# Patient Record
Sex: FEMALE | Race: WHITE | NOT HISPANIC OR LATINO | Employment: OTHER | URBAN - METROPOLITAN AREA
[De-identification: names, ages, dates, MRNs, and addresses within clinical notes are randomized per-mention and may not be internally consistent; named-entity substitution may affect disease eponyms.]

---

## 2017-01-17 ENCOUNTER — TELEPHONE (OUTPATIENT)
Dept: INTERNAL MEDICINE | Facility: CLINIC | Age: 82
End: 2017-01-17

## 2017-01-18 DIAGNOSIS — Z94.0 S/P KIDNEY TRANSPLANT: Primary | ICD-10-CM

## 2017-01-18 DIAGNOSIS — Z79.60 LONG-TERM USE OF IMMUNOSUPPRESSANT MEDICATION: ICD-10-CM

## 2017-01-18 DIAGNOSIS — D84.9 IMMUNOSUPPRESSED STATUS (H): ICD-10-CM

## 2017-01-18 DIAGNOSIS — Z94.0 KIDNEY TRANSPLANT RECIPIENT: ICD-10-CM

## 2017-01-18 RX ORDER — AZATHIOPRINE 50 MG/1
50 TABLET ORAL DAILY
Qty: 90 TABLET | Refills: 3 | Status: SHIPPED | OUTPATIENT
Start: 2017-01-18 | End: 2017-01-24

## 2017-01-18 RX ORDER — CYCLOSPORINE 100 MG/1
100 CAPSULE, LIQUID FILLED ORAL DAILY
Qty: 90 CAPSULE | Refills: 3 | Status: SHIPPED | OUTPATIENT
Start: 2017-01-18 | End: 2017-01-26

## 2017-01-19 ENCOUNTER — TELEPHONE (OUTPATIENT)
Dept: TRANSPLANT | Facility: CLINIC | Age: 82
End: 2017-01-19

## 2017-01-19 NOTE — TELEPHONE ENCOUNTER
Prior Authorization Specialty Medication Request    Medication/Dose: Gengraf 100 mg  Diagnosis and ICD: Kidney Transplant Z94.0  New/Renewal/Insurance Change PA:     Important Lab Values:     Previously Tried and Failed Therapies:     Rationale:     Would you like to include any research articles?                If yes please include the hyperlink(s) below or fax @ [phone].                (Include Name and MRN)    If you received a fax notification from an outside Pharmacy;  Pharmacy Name:[facility]  Pharmacy #:[phone]  Pharmacy Fax:[phone]

## 2017-01-19 NOTE — TELEPHONE ENCOUNTER
Prior Authorization Specialty Medication Request    Medication/Dose: Azathioprine 50 mg daily  Diagnosis and ICD: Kidney Transplant Z94.0  New/Renewal/Insurance Change PA:     Important Lab Values:     Previously Tried and Failed Therapies:     Rationale:     Would you like to include any research articles?                If yes please include the hyperlink(s) below or fax @ [phone].                (Include Name and MRN)    If you received a fax notification from an outside Pharmacy;  Pharmacy Name:[facility]  Pharmacy #:[phone]  Pharmacy Fax:[phone]

## 2017-01-19 NOTE — TELEPHONE ENCOUNTER
Prior Authorization Specialty Medication Request    Medication/Dose: Azathioprine 50 mg daily  Diagnosis and ICD: Kidney Transplant Z94.0  New/Renewal/Insurance Change PA:     Important Lab Values:     Previously Tried and Failed Therapies:     Rationale:     Would you like to include any research articles?    If yes please include the hyperlink(s) below or fax @ [phone].    (Include Name and MRN)    If you received a fax notification from an outside Pharmacy;  Pharmacy Name:[facility]  Pharmacy #:[phone]  Pharmacy Fax:[phone]

## 2017-01-24 ENCOUNTER — TELEPHONE (OUTPATIENT)
Dept: TRANSPLANT | Facility: CLINIC | Age: 82
End: 2017-01-24

## 2017-01-24 DIAGNOSIS — Z79.60 LONG-TERM USE OF IMMUNOSUPPRESSANT MEDICATION: ICD-10-CM

## 2017-01-24 DIAGNOSIS — Z94.0 KIDNEY TRANSPLANT RECIPIENT: Primary | ICD-10-CM

## 2017-01-24 RX ORDER — AZATHIOPRINE 50 MG/1
50 TABLET ORAL DAILY
Qty: 7 TABLET | Refills: 0 | Status: SHIPPED | OUTPATIENT
Start: 2017-01-24 | End: 2017-12-22

## 2017-01-24 RX ORDER — AZATHIOPRINE 50 MG/1
50 TABLET ORAL DAILY
Qty: 90 TABLET | Refills: 3 | Status: SHIPPED
Start: 2017-01-24 | End: 2017-01-24

## 2017-01-24 NOTE — TELEPHONE ENCOUNTER
[facility] Prior Authorization Team   Phone: [phone]  Fax: [phone]      PA Initiation    Medication: cycloSPORINE modified (GENERIC EQUIVALENT) 100 MG capsule   Insurance Company: [employer] - Phone [phone] Fax [phone]  Pharmacy Filling the Rx: [facility] - [address]  Filling Pharmacy Phone: [phone]  Filling Pharmacy Fax: [phone]  Start Date: 1/23/2017    If [employer] has not replied to your request within 24 hours please contact [employer] at [phone]

## 2017-01-24 NOTE — TELEPHONE ENCOUNTER
[facility] Prior Authorization Team   Phone: [phone]  Fax: [phone]      PRIOR AUTHORIZATION DENIED    Medication: Azathioprine 50 mg daily    Denial Date:      Denial Rational: PA was already submitted for this patient and drug which was denied.;CaseId:[id number];Status:Denied    Appeal Information: Appeal Information: Attention:COMPLAINTS, APPEALS, GRIEVANCES [facility] [address],[place], [place],[address] Phone:[phone];

## 2017-01-24 NOTE — TELEPHONE ENCOUNTER
Cande ([employer]) left VM re: PA below. PA approved, backdated covered of 12/24/16 w/ lifetime approval to 2099. Ref number: [id number]. Can be reached at [phone]. Message sent to PA pool.

## 2017-01-24 NOTE — TELEPHONE ENCOUNTER
Prior Authorization Specialty Medication Request    Medication/Dose: Imuran 50 mg daily  Diagnosis and ICD: Kidney Transplant Z94.0  New/Renewal/Insurance Change PA:     Important Lab Values:     Previously Tried and Failed Therapies:     Rationale:     Would you like to include any research articles?    If yes please include the hyperlink(s) below or fax @ [phone].    (Include Name and MRN)    If you received a fax notification from an outside Pharmacy;  Pharmacy Name:[facility]  Pharmacy #:  Pharmacy Fax:

## 2017-01-26 DIAGNOSIS — Z79.60 LONG-TERM USE OF IMMUNOSUPPRESSANT MEDICATION: ICD-10-CM

## 2017-01-26 DIAGNOSIS — Z94.0 KIDNEY TRANSPLANT RECIPIENT: Primary | ICD-10-CM

## 2017-01-26 RX ORDER — CYCLOSPORINE 100 MG/1
100 CAPSULE, LIQUID FILLED ORAL DAILY
Qty: 90 CAPSULE | Refills: 3 | Status: SHIPPED
Start: 2017-01-26 | End: 2019-09-25 | Stop reason: DRUGHIGH

## 2017-01-30 DIAGNOSIS — Z79.60 LONG-TERM USE OF IMMUNOSUPPRESSANT MEDICATION: ICD-10-CM

## 2017-01-30 DIAGNOSIS — Z94.0 KIDNEY TRANSPLANT RECIPIENT: Primary | ICD-10-CM

## 2017-01-30 RX ORDER — AZATHIOPRINE 50 MG/1
50 TABLET ORAL DAILY
Qty: 30 TABLET | Refills: 1 | OUTPATIENT
Start: 2017-01-30

## 2017-01-31 ENCOUNTER — TRANSFERRED RECORDS (OUTPATIENT)
Dept: HEALTH INFORMATION MANAGEMENT | Facility: CLINIC | Age: 82
End: 2017-01-31

## 2017-01-31 NOTE — TELEPHONE ENCOUNTER
[facility] Prior Authorization Team   Phone: [phone]  Fax: [phone]    PRIOR AUTHORIZATION DENIED    Medication: Imuran - Denied    Denial Date: 1/31/2017    Denial Rational: see below    Appeal Information: *Appeal Information: Attention:COMPLAINTS, APPEALS, MAXINE WILLETT [address],[place], [place],[address] Phone:[phone]. Please let us know if you would like to appeal this denial. If appealing, please provide a letter of medical necessity as to why patient needs Imuran and specific if Brand or generic.

## 2017-04-04 ENCOUNTER — TELEPHONE (OUTPATIENT)
Dept: INTERNAL MEDICINE | Facility: CLINIC | Age: 82
End: 2017-04-04

## 2017-05-24 ENCOUNTER — TELEPHONE (OUTPATIENT)
Dept: INTERNAL MEDICINE | Facility: CLINIC | Age: 82
End: 2017-05-24

## 2017-05-24 DIAGNOSIS — Z94.0 S/P KIDNEY TRANSPLANT: Primary | ICD-10-CM

## 2017-05-24 DIAGNOSIS — R19.4 CHANGE IN BOWEL HABITS: ICD-10-CM

## 2017-05-24 DIAGNOSIS — M19.90 ARTHRITIS: ICD-10-CM

## 2017-05-24 DIAGNOSIS — N25.81 SECONDARY HYPERPARATHYROIDISM OF RENAL ORIGIN (H): ICD-10-CM

## 2017-05-24 NOTE — TELEPHONE ENCOUNTER
Patient calling in and states has an appointment with new Nephrologist on 6/1/17, Dr. ISAMAR Díaz through [facility]. States would need Cr for sure and any other PCP thought necessary for nephrology.  Call patient when ordered and advise if needs to be fasting. OK to leave detailed message for patient.  Provider please review and advise.

## 2017-05-26 DIAGNOSIS — N25.81 SECONDARY HYPERPARATHYROIDISM OF RENAL ORIGIN (H): ICD-10-CM

## 2017-05-26 DIAGNOSIS — M19.90 ARTHRITIS: ICD-10-CM

## 2017-05-26 DIAGNOSIS — R19.4 CHANGE IN BOWEL HABITS: ICD-10-CM

## 2017-05-26 DIAGNOSIS — Z94.0 S/P KIDNEY TRANSPLANT: ICD-10-CM

## 2017-05-26 LAB
ERYTHROCYTE [DISTWIDTH] IN BLOOD BY AUTOMATED COUNT: 14.6 % (ref 10–15)
HCT VFR BLD AUTO: 40.6 % (ref 35–47)
HGB BLD-MCNC: 12.9 G/DL (ref 11.7–15.7)
MCH RBC QN AUTO: 30.8 PG (ref 26.5–33)
MCHC RBC AUTO-ENTMCNC: 31.8 G/DL (ref 31.5–36.5)
MCV RBC AUTO: 97 FL (ref 78–100)
PLATELET # BLD AUTO: 259 10E9/L (ref 150–450)
PTH-INTACT SERPL-MCNC: 90 PG/ML (ref 12–72)
RBC # BLD AUTO: 4.19 10E12/L (ref 3.8–5.2)
WBC # BLD AUTO: 7.7 10E9/L (ref 4–11)

## 2017-05-26 PROCEDURE — 82306 VITAMIN D 25 HYDROXY: CPT | Performed by: INTERNAL MEDICINE

## 2017-05-26 PROCEDURE — 83970 ASSAY OF PARATHORMONE: CPT | Performed by: INTERNAL MEDICINE

## 2017-05-26 PROCEDURE — 36415 COLL VENOUS BLD VENIPUNCTURE: CPT | Performed by: INTERNAL MEDICINE

## 2017-05-26 PROCEDURE — 85027 COMPLETE CBC AUTOMATED: CPT | Performed by: INTERNAL MEDICINE

## 2017-05-26 PROCEDURE — 80053 COMPREHEN METABOLIC PANEL: CPT | Performed by: INTERNAL MEDICINE

## 2017-05-26 PROCEDURE — 84443 ASSAY THYROID STIM HORMONE: CPT | Performed by: INTERNAL MEDICINE

## 2017-05-26 PROCEDURE — 80061 LIPID PANEL: CPT | Performed by: INTERNAL MEDICINE

## 2017-05-27 LAB
ALBUMIN SERPL-MCNC: 3.8 G/DL (ref 3.4–5)
ALP SERPL-CCNC: 100 U/L (ref 40–150)
ALT SERPL W P-5'-P-CCNC: 15 U/L (ref 0–50)
ANION GAP SERPL CALCULATED.3IONS-SCNC: 8 MMOL/L (ref 3–14)
AST SERPL W P-5'-P-CCNC: 18 U/L (ref 0–45)
BILIRUB SERPL-MCNC: 0.8 MG/DL (ref 0.2–1.3)
BUN SERPL-MCNC: 18 MG/DL (ref 7–30)
CALCIUM SERPL-MCNC: 9.5 MG/DL (ref 8.5–10.1)
CHLORIDE SERPL-SCNC: 100 MMOL/L (ref 94–109)
CHOLEST SERPL-MCNC: 203 MG/DL
CO2 SERPL-SCNC: 26 MMOL/L (ref 20–32)
CREAT SERPL-MCNC: 0.87 MG/DL (ref 0.52–1.04)
GFR SERPL CREATININE-BSD FRML MDRD: 63 ML/MIN/1.7M2
GLUCOSE SERPL-MCNC: 105 MG/DL (ref 70–99)
HDLC SERPL-MCNC: 69 MG/DL
LDLC SERPL CALC-MCNC: 108 MG/DL
NONHDLC SERPL-MCNC: 134 MG/DL
POTASSIUM SERPL-SCNC: 4.4 MMOL/L (ref 3.4–5.3)
PROT SERPL-MCNC: 7.6 G/DL (ref 6.8–8.8)
SODIUM SERPL-SCNC: 134 MMOL/L (ref 133–144)
TRIGL SERPL-MCNC: 131 MG/DL
TSH SERPL DL<=0.005 MIU/L-ACNC: 2.2 MU/L (ref 0.4–4)

## 2017-05-28 LAB — DEPRECATED CALCIDIOL+CALCIFEROL SERPL-MC: 53 UG/L (ref 20–75)

## 2017-06-07 ENCOUNTER — TELEPHONE (OUTPATIENT)
Dept: INTERNAL MEDICINE | Facility: CLINIC | Age: 82
End: 2017-06-07

## 2017-07-23 ENCOUNTER — DOCUMENTATION ONLY (OUTPATIENT)
Dept: OTHER | Facility: CLINIC | Age: 82
End: 2017-07-23

## 2017-07-23 DIAGNOSIS — Z71.89 ADVANCED DIRECTIVES, COUNSELING/DISCUSSION: Chronic | ICD-10-CM

## 2017-08-15 ENCOUNTER — OFFICE VISIT (OUTPATIENT)
Dept: INTERNAL MEDICINE | Facility: CLINIC | Age: 82
End: 2017-08-15
Payer: COMMERCIAL

## 2017-08-15 VITALS
HEART RATE: 101 BPM | SYSTOLIC BLOOD PRESSURE: 118 MMHG | DIASTOLIC BLOOD PRESSURE: 68 MMHG | OXYGEN SATURATION: 96 % | WEIGHT: 146.3 LBS | HEIGHT: 65 IN | BODY MASS INDEX: 24.37 KG/M2 | TEMPERATURE: 97.7 F

## 2017-08-15 DIAGNOSIS — D84.9 IMMUNOSUPPRESSED STATUS (H): ICD-10-CM

## 2017-08-15 DIAGNOSIS — N25.81 SECONDARY HYPERPARATHYROIDISM OF RENAL ORIGIN (H): ICD-10-CM

## 2017-08-15 DIAGNOSIS — Z00.00 ROUTINE GENERAL MEDICAL EXAMINATION AT A HEALTH CARE FACILITY: Primary | ICD-10-CM

## 2017-08-15 DIAGNOSIS — Z94.0 S/P KIDNEY TRANSPLANT: ICD-10-CM

## 2017-08-15 LAB
ERYTHROCYTE [DISTWIDTH] IN BLOOD BY AUTOMATED COUNT: 14.4 % (ref 10–15)
HCT VFR BLD AUTO: 42.1 % (ref 35–47)
HGB BLD-MCNC: 13.4 G/DL (ref 11.7–15.7)
MCH RBC QN AUTO: 30.5 PG (ref 26.5–33)
MCHC RBC AUTO-ENTMCNC: 31.8 G/DL (ref 31.5–36.5)
MCV RBC AUTO: 96 FL (ref 78–100)
PLATELET # BLD AUTO: 281 10E9/L (ref 150–450)
PTH-INTACT SERPL-MCNC: 75 PG/ML (ref 12–72)
RBC # BLD AUTO: 4.4 10E12/L (ref 3.8–5.2)
WBC # BLD AUTO: 9.1 10E9/L (ref 4–11)

## 2017-08-15 PROCEDURE — 82306 VITAMIN D 25 HYDROXY: CPT | Performed by: INTERNAL MEDICINE

## 2017-08-15 PROCEDURE — 83970 ASSAY OF PARATHORMONE: CPT | Performed by: INTERNAL MEDICINE

## 2017-08-15 PROCEDURE — 80053 COMPREHEN METABOLIC PANEL: CPT | Performed by: INTERNAL MEDICINE

## 2017-08-15 PROCEDURE — 36415 COLL VENOUS BLD VENIPUNCTURE: CPT | Performed by: INTERNAL MEDICINE

## 2017-08-15 PROCEDURE — 99397 PER PM REEVAL EST PAT 65+ YR: CPT | Performed by: INTERNAL MEDICINE

## 2017-08-15 PROCEDURE — 80061 LIPID PANEL: CPT | Performed by: INTERNAL MEDICINE

## 2017-08-15 PROCEDURE — 85027 COMPLETE CBC AUTOMATED: CPT | Performed by: INTERNAL MEDICINE

## 2017-08-15 PROCEDURE — 84443 ASSAY THYROID STIM HORMONE: CPT | Performed by: INTERNAL MEDICINE

## 2017-08-15 NOTE — MR AVS SNAPSHOT
After Visit Summary   8/15/2017    Cande Baird    MRN: [id number]           Patient Information     Date Of Birth          1935        Visit Information        Provider Department      8/15/2017 10:20 AM Donna Biard MD [facility]        Today's Diagnoses     Routine general medical examination at a health care facility    -  1    Immunosuppressed status (H)        Secondary hyperparathyroidism of renal origin (H)         S/P kidney transplant          Care Instructions      Preventive Health Recommendations    Female Ages 65 +    Yearly exam:     See your health care provider every year in order to  o Review health changes.   o Discuss preventive care.    o Review your medicines if your doctor has prescribed any.      You no longer need a yearly Pap test unless you've had an abnormal Pap test in the past 10 years. If you have vaginal symptoms, such as bleeding or discharge, be sure to talk with your provider about a Pap test.      Every 1 to 2 years, have a mammogram.  If you are over 69, talk with your health care provider about whether or not you want to continue having screening mammograms.      Every 10 years, have a colonoscopy. Or, have a yearly FIT test (stool test). These exams will check for colon cancer.       Have a cholesterol test every 5 years, or more often if your doctor advises it.       Have a diabetes test (fasting glucose) every three years. If you are at risk for diabetes, you should have this test more often.       At age 65, have a bone density scan (DEXA) to check for osteoporosis (brittle bone disease).    Shots:    Get a flu shot each year.    Get a tetanus shot every 10 years.    Talk to your doctor about your pneumonia vaccines. There are now two you should receive - Pneumovax (PPSV 23) and Prevnar (PCV 13).    Talk to your doctor about the shingles vaccine.    Talk to your doctor about the hepatitis B vaccine.    Nutrition:     Eat at least 5  servings of fruits and vegetables each day.      Eat whole-grain bread, whole-wheat pasta and brown rice instead of white grains and rice.      Talk to your provider about Calcium and Vitamin D.     Lifestyle    Exercise at least 150 minutes a week (30 minutes a day, 5 days a week). This will help you control your weight and prevent disease.      Limit alcohol to one drink per day.      No smoking.       Wear sunscreen to prevent skin cancer.       See your dentist twice a year for an exam and cleaning.      See your eye doctor every 1 to 2 years to screen for conditions such as glaucoma, macular degeneration and cataracts.          Follow-ups after your visit        Who to contact     If you have questions or need follow up information about today's clinic visit or your schedule please contact [facility] directly at [phone].  Normal or non-critical lab and imaging results will be communicated to you by MyChart, letter or phone within 4 business days after the clinic has received the results. If you do not hear from us within 7 days, please contact the clinic through [employer] or phone. If you have a critical or abnormal lab result, we will notify you by phone as soon as possible.  Submit refill requests through [employer] or call your pharmacy and they will forward the refill request to us. Please allow 3 business days for your refill to be completed.          Additional Information About Your Visit        [employer] Information     [employer] gives you secure access to your electronic health record. If you see a primary care provider, you can also send messages to your care team and make appointments. If you have questions, please call your primary care clinic.  If you do not have a primary care provider, please call [phone] and they will assist you.        Care EveryWhere ID     This is your Care EveryWhere ID. This could be used by other organizations to access your [facility]  "records  [id number]        Your Vitals Were     Pulse Temperature Height Pulse Oximetry Breastfeeding? BMI (Body Mass Index)    101 97.7  F (36.5  C) (Oral) 5' 5\" (1.651 m) 96% No 24.35 kg/m2       Blood Pressure from Last 3 Encounters:   08/15/17 118/68   12/28/16 112/72   12/20/16 122/68    Weight from Last 3 Encounters:   08/15/17 146 lb 4.8 oz (66.4 kg)   12/28/16 147 lb (66.7 kg)   12/20/16 147 lb (66.7 kg)              We Performed the Following     CBC with platelets     Comprehensive metabolic panel (BMP + Alb, Alk Phos, ALT, AST, Total. Bili, TP)     Lipid panel reflex to direct LDL     Parathyroid Hormone Intact     TSH with free T4 reflex     Vitamin D Deficiency        Primary Care Provider Office Phone # Fax #    Donna Baird [phone]       [address] [facility] [address]  [facility] [address]        Equal Access to Services     LYNDA [employer] : Hadii aad ku hadasho Soomaali, waaxda luqadaha, qaybta kaalmada adeegyada, waxay idiin hayrosanan jn orourke . So [facility] [phone].    ATENCIÓN: Si habla español, tiene a escobar disposición servicios gratuitos de asistencia lingüística. [facility] [phone].    We comply with applicable federal civil rights laws and [place] laws. We do not discriminate on the basis of race, color, national origin, age, disability sex, sexual orientation or gender identity.            Thank you!     Thank you for choosing [facility]  for your care. Our goal is always to provide you with excellent care. Hearing back from our patients is one way we can continue to improve our services. Please take a few minutes to complete the written survey that you may receive in the mail after your visit with us. Thank you!             Your Updated Medication List - Protect others around you: Learn how to safely use, store and throw away your medicines at www.disposemymeds.org.          This list is accurate as of: 8/15/17 10:52 AM.  Always use your " most recent med list.                   Brand Name Dispense Instructions for use Diagnosis    aspirin 81 MG tablet     30 tablet    Take by mouth daily    S/P kidney transplant       azaTHIOprine 50 MG tablet    IMURAN    7 tablet    Take 1 tablet (50 mg) by mouth daily    Kidney transplant recipient, Long-term use of immunosuppressant medication       ciprofloxacin 500 MG tablet    CIPRO    14 tablet    Take 1 tablet (500 mg) by mouth 2 times daily    Dysuria       cycloSPORINE modified 100 MG capsule    GENERIC EQUIVALENT    90 capsule    Take 1 capsule (100 mg) by mouth daily    Kidney transplant recipient, Long-term use of immunosuppressant medication       GLUCOSAMINE SULFATE PO      Take 1,000 mg by mouth 2 times daily Takes 1000mg in the morning and 1000mg in the evening        saccharomyces boulardii 250 MG capsule    FLORASTOR     Take 250 mg by mouth daily        vitamin B complex with vitamin C Tabs tablet      Take 1 tablet by mouth daily        VITAMIN D3 PO      Take 2,000 Units by mouth daily

## 2017-08-15 NOTE — PROGRESS NOTES
SUBJECTIVE:   Cande Baird is a 82 year old female who presents for Preventive Visit.      Are you in the first 12 months of your Medicare Part B coverage?  No    Healthy Habits:    Do you get at least three servings of calcium containing foods daily (dairy, green leafy vegetables, etc.)? yes    Amount of exercise or daily activities, outside of work: 3-4 day(s) per week    Problems taking medications regularly No    Medication side effects: No    Have you had an eye exam in the past two years? yes    Do you see a dentist twice per year? no    Do you have sleep apnea, excessive snoring or daytime drowsiness?no    COGNITIVE SCREEN  1) Repeat 3 items (Banana, Sunrise, Chair)    2) Clock draw: NORMAL  3) 3 item recall: Recalls 3 objects  Results: 3 items recalled: COGNITIVE IMPAIRMENT LESS LIKELY    Mini-CogTM Copyright S Shravan. Licensed by the author for use in [facility]; reprinted with permission (mary@[facility]). All rights reserved.                  Reviewed and updated as needed this visit by clinical staffTobacco  Allergies  Meds  Med Hx  Surg Hx  Fam Hx  Soc Hx        Reviewed and updated as needed this visit by Provider        Social History   Substance Use Topics     Smoking status: Former Smoker     Types: Cigarettes     Quit date: 5/25/1988     Smokeless tobacco: Never Used     Alcohol use 0.0 oz/week     0 Standard drinks or equivalent per week       The patient does not drink >3 drinks per day nor >7 drinks per week.    Today's PHQ-2 Score:   PHQ-2 ( 1999 Pfizer) 8/15/2017 12/20/2016   Q1: Little interest or pleasure in doing things 0 0   Q2: Feeling down, depressed or hopeless 0 0   PHQ-2 Score 0 0   Some recent data might be hidden         Do you feel safe in your environment - Yes    Do you have a Health Care Directive?: No: Advance care planning reviewed with patient; information given to patient to review.      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    Ability to successfully perform activities of daily living: Yes, no assistance needed     Fall risk:  Fallen 2 or more times in the past year?: No  Any fall with injury in the past year?: No      Home safety:  none identified      The following health maintenance items are reviewed in Epic and correct as of today:Health Maintenance   Topic Date Due     DEXA SCAN SCREENING (SYSTEM ASSIGNED)  07/01/2002     TETANUS IMMUNIZATION (SYSTEM ASSIGNED)  01/01/2015     PNEUMOCOCCAL (2 of 2 - PPSV23) 03/31/2016     INFLUENZA VACCINE (SYSTEM ASSIGNED)  09/01/2017     FALL RISK ASSESSMENT  12/20/2017     ADVANCE DIRECTIVE PLANNING Q5 YRS  07/23/2022     BP Readings from Last 3 Encounters:   08/15/17 118/68   12/28/16 112/72   12/20/16 122/68    Wt Readings from Last 3 Encounters:   08/15/17 146 lb 4.8 oz (66.4 kg)   12/28/16 147 lb (66.7 kg)   12/20/16 147 lb (66.7 kg)                  Patient Active Problem List   Diagnosis     Long-term use of immunosuppressant medication     Immunosuppressed status (H)     S/P kidney transplant     Arthritis     Secondary hyperparathyroidism of renal origin (H)      Advance Care Planning     Past Surgical History:   Procedure Laterality Date     COLONOSCOPY  5/15/2013     LAPAROSCOPIC CYSTECTOMY OVARIAN (BENIGN)       TRANSPLANT  October 17, 1987    Kidney       Social History   Substance Use Topics     Smoking status: Former Smoker     Types: Cigarettes     Quit date: 5/25/1988     Smokeless tobacco: Never Used     Alcohol use 0.0 oz/week     0 Standard drinks or equivalent per week     Family History   Problem Relation Age of Onset     DIABETES Mother 60     Peptic Ulcer Disease Father      DIABETES Sister      Rheumatoid Arthritis Sister      DIABETES Sister          Current Outpatient Prescriptions   Medication Sig Dispense Refill      "cycloSPORINE modified (GENERIC EQUIVALENT) 100 MG capsule Take 1 capsule (100 mg) by mouth daily 90 capsule 3     azaTHIOprine (IMURAN) 50 MG tablet Take 1 tablet (50 mg) by mouth daily 7 tablet 0     vitamin  B complex with vitamin C (VITAMIN  B COMPLEX) TABS Take 1 tablet by mouth daily       Cholecalciferol (VITAMIN D3 PO) Take 2,000 Units by mouth daily       GLUCOSAMINE SULFATE PO Take 1,000 mg by mouth 2 times daily Takes 1000mg in the morning and 1000mg in the evening       aspirin 81 MG tablet Take by mouth daily 30 tablet      ciprofloxacin (CIPRO) 500 MG tablet Take 1 tablet (500 mg) by mouth 2 times daily (Patient not taking: Reported on 8/15/2017) 14 tablet 0     saccharomyces boulardii (FLORASTOR) 250 MG capsule Take 250 mg by mouth daily                 ROS:  C: NEGATIVE for fever, chills, change in weight  I: NEGATIVE for worrisome rashes, moles or lesions  E: NEGATIVE for vision changes or irritation  ENT/MOUTH: some decreased hearing left ear  R: NEGATIVE for significant cough or SOB  B: NEGATIVE for masses, tenderness or discharge  CV: NEGATIVE for chest pain, palpitations or peripheral edema  GI: NEGATIVE for nausea, abdominal pain, heartburn, or change in bowel habits  : NEGATIVE for frequency, dysuria, or hematuria  M: NEGATIVE for significant arthralgias or myalgia  N: NEGATIVE for weakness, dizziness or paresthesias  E: NEGATIVE for temperature intolerance, skin/hair changes  H: NEGATIVE for bleeding problems  P: NEGATIVE for changes in mood or affect    OBJECTIVE:   There were no vitals taken for this visit. Estimated body mass index is 24.46 kg/(m^2) as calculated from the following:    Height as of 12/28/16: 5' 5\" (1.651 m).    Weight as of 12/28/16: 147 lb (66.7 kg).  EXAM:   GENERAL: healthy, alert and no distress  EYES: Eyes grossly normal to inspection, PERRL and conjunctivae and sclerae normal  HENT: ear canals and TM's normal, nose and mouth without ulcers or lesions  NECK: no " "adenopathy, no asymmetry, masses, or scars and thyroid normal to palpation  RESP: lungs clear to auscultation - no rales, rhonchi or wheezes  CV: regular rate and rhythm, normal S1 S2, no S3 or S4, no murmur, click or rub, no peripheral edema and peripheral pulses strong  ABDOMEN: soft, nontender, no hepatosplenomegaly, no masses and bowel sounds normal  MS: no gross musculoskeletal defects noted, no edema  SKIN: no suspicious lesions or rashes  NEURO: Normal strength and tone, mentation intact and speech normal  PSYCH: mentation appears normal, affect normal/bright    ASSESSMENT / PLAN:   1. Routine general medical examination at a health care facility     - CBC with platelets  - TSH with free T4 reflex  - Lipid panel reflex to direct LDL  - Comprehensive metabolic panel (BMP + Alb, Alk Phos, ALT, AST, Total. Bili, TP)  - Vitamin D Deficiency  - Parathyroid Hormone Intact    2. Immunosuppressed status (H)       3. Secondary hyperparathyroidism of renal origin (H)        4. S/P kidney transplant         End of Life Planning:  Patient currently has an advanced directive: No.  I have verified the patient's ablity to prepare an advanced directive/make health care decisions.  Literature was provided to assist patient in preparing an advanced directive.    COUNSELING:  Reviewed preventive health counseling, as reflected in patient instructions       Regular exercise       Healthy diet/nutrition      Estimated body mass index is 24.46 kg/(m^2) as calculated from the following:    Height as of 12/28/16: 5' 5\" (1.651 m).    Weight as of 12/28/16: 147 lb (66.7 kg).     reports that she quit smoking about 29 years ago. Her smoking use included Cigarettes. She has never used smokeless tobacco.        Appropriate preventive services were discussed with this patient, including applicable screening as appropriate for cardiovascular disease, diabetes, osteopenia/osteoporosis, and glaucoma.  As appropriate for age/gender, discussed " screening for colorectal cancer, prostate cancer, breast cancer, and cervical cancer. Checklist reviewing preventive services available has been given to the patient.    Reviewed patients plan of care and provided an AVS. The Basic Care Plan (routine screening as documented in Health Maintenance) for Cande meets the Care Plan requirement. This Care Plan has been established and reviewed with the Patient.    Counseling Resources:  ATP IV Guidelines  Pooled Cohorts Equation Calculator  Breast Cancer Risk Calculator  FRAX Risk Assessment  ICSI Preventive Guidelines  Dietary Guidelines for Americans, 2010  USDA's MyPlate  ASA Prophylaxis  Lung CA Screening    Donna Baird MD  [facility]

## 2017-08-15 NOTE — NURSING NOTE
"Chief Complaint   Patient presents with     Medicare Visit     Pt is fasting       Initial /68 (BP Location: Left arm, Patient Position: Sitting, Cuff Size: Adult Regular)  Pulse 101  Temp 97.7  F (36.5  C) (Oral)  Ht 5' 5\" (1.651 m)  Wt 146 lb 4.8 oz (66.4 kg)  SpO2 96%  Breastfeeding? No  BMI 24.35 kg/m2 Estimated body mass index is 24.35 kg/(m^2) as calculated from the following:    Height as of this encounter: 5' 5\" (1.651 m).    Weight as of this encounter: 146 lb 4.8 oz (66.4 kg).  Medication Reconciliation: complete   Reyes Ortiz MA    "

## 2017-08-16 LAB
ALBUMIN SERPL-MCNC: 3.8 G/DL (ref 3.4–5)
ALP SERPL-CCNC: 103 U/L (ref 40–150)
ALT SERPL W P-5'-P-CCNC: 18 U/L (ref 0–50)
ANION GAP SERPL CALCULATED.3IONS-SCNC: 6 MMOL/L (ref 3–14)
AST SERPL W P-5'-P-CCNC: 21 U/L (ref 0–45)
BILIRUB SERPL-MCNC: 0.6 MG/DL (ref 0.2–1.3)
BUN SERPL-MCNC: 18 MG/DL (ref 7–30)
CALCIUM SERPL-MCNC: 9.5 MG/DL (ref 8.5–10.1)
CHLORIDE SERPL-SCNC: 100 MMOL/L (ref 94–109)
CHOLEST SERPL-MCNC: 220 MG/DL
CO2 SERPL-SCNC: 28 MMOL/L (ref 20–32)
CREAT SERPL-MCNC: 0.93 MG/DL (ref 0.52–1.04)
DEPRECATED CALCIDIOL+CALCIFEROL SERPL-MC: 51 UG/L (ref 20–75)
GFR SERPL CREATININE-BSD FRML MDRD: 58 ML/MIN/1.7M2
GLUCOSE SERPL-MCNC: 103 MG/DL (ref 70–99)
HDLC SERPL-MCNC: 79 MG/DL
LDLC SERPL CALC-MCNC: 113 MG/DL
NONHDLC SERPL-MCNC: 141 MG/DL
POTASSIUM SERPL-SCNC: 4.3 MMOL/L (ref 3.4–5.3)
PROT SERPL-MCNC: 8 G/DL (ref 6.8–8.8)
SODIUM SERPL-SCNC: 134 MMOL/L (ref 133–144)
TRIGL SERPL-MCNC: 139 MG/DL
TSH SERPL DL<=0.005 MIU/L-ACNC: 1.82 MU/L (ref 0.4–4)

## 2017-08-30 ENCOUNTER — TELEPHONE (OUTPATIENT)
Dept: INTERNAL MEDICINE | Facility: CLINIC | Age: 82
End: 2017-08-30

## 2017-08-30 DIAGNOSIS — R35.0 URINARY FREQUENCY: Primary | ICD-10-CM

## 2017-08-30 DIAGNOSIS — R35.0 URINARY FREQUENCY: ICD-10-CM

## 2017-08-30 LAB
ALBUMIN UR-MCNC: 30 MG/DL
APPEARANCE UR: ABNORMAL
BACTERIA #/AREA URNS HPF: ABNORMAL /HPF
BILIRUB UR QL STRIP: NEGATIVE
COLOR UR AUTO: YELLOW
GLUCOSE UR STRIP-MCNC: NEGATIVE MG/DL
HGB UR QL STRIP: ABNORMAL
KETONES UR STRIP-MCNC: ABNORMAL MG/DL
LEUKOCYTE ESTERASE UR QL STRIP: ABNORMAL
NITRATE UR QL: POSITIVE
NON-SQ EPI CELLS #/AREA URNS LPF: ABNORMAL /LPF
PH UR STRIP: 5.5 PH (ref 5–7)
RBC #/AREA URNS AUTO: ABNORMAL /HPF
SOURCE: ABNORMAL
SP GR UR STRIP: 1.01 (ref 1–1.03)
UROBILINOGEN UR STRIP-ACNC: 0.2 EU/DL (ref 0.2–1)
WBC #/AREA URNS AUTO: >100 /HPF

## 2017-08-30 PROCEDURE — 81001 URINALYSIS AUTO W/SCOPE: CPT | Performed by: INTERNAL MEDICINE

## 2017-08-30 PROCEDURE — 87088 URINE BACTERIA CULTURE: CPT | Performed by: INTERNAL MEDICINE

## 2017-08-30 PROCEDURE — 87186 SC STD MICRODIL/AGAR DIL: CPT | Performed by: INTERNAL MEDICINE

## 2017-08-30 PROCEDURE — 87086 URINE CULTURE/COLONY COUNT: CPT | Performed by: INTERNAL MEDICINE

## 2017-08-30 RX ORDER — CIPROFLOXACIN 500 MG/1
500 TABLET, FILM COATED ORAL 2 TIMES DAILY
Qty: 14 TABLET | Refills: 0 | Status: SHIPPED | OUTPATIENT
Start: 2017-08-30 | End: 2017-11-29

## 2017-08-30 NOTE — TELEPHONE ENCOUNTER
Pt called and reached. Schedule for a lab appt and informed that Cipro was sent to pharmacy, as soon as sample is collected ok to start Cipro.  Pt was very thankful for this care.    Ronald CLARK RN

## 2017-08-30 NOTE — TELEPHONE ENCOUNTER
Pt calling in stating she thinks she has another UTI, she also is a kidney transplant. Symptoms of going more freq, burning, foul odor.    Typically, gives sample and Dr. Baird treats with Cipro.    Order pended for [facility]  Pharmacy listed  Please advise  Ronald CLARK RN    Call pt back to get her schedule for a lab appt.  Ronald CLARK RN

## 2017-09-02 LAB
BACTERIA SPEC CULT: ABNORMAL
SPECIMEN SOURCE: ABNORMAL

## 2017-11-29 ENCOUNTER — NURSE TRIAGE (OUTPATIENT)
Dept: NURSING | Facility: CLINIC | Age: 82
End: 2017-11-29

## 2017-11-29 ENCOUNTER — OFFICE VISIT (OUTPATIENT)
Dept: INTERNAL MEDICINE | Facility: CLINIC | Age: 82
End: 2017-11-29
Payer: COMMERCIAL

## 2017-11-29 ENCOUNTER — TELEPHONE (OUTPATIENT)
Dept: INTERNAL MEDICINE | Facility: CLINIC | Age: 82
End: 2017-11-29

## 2017-11-29 ENCOUNTER — RADIANT APPOINTMENT (OUTPATIENT)
Dept: GENERAL RADIOLOGY | Facility: CLINIC | Age: 82
End: 2017-11-29
Attending: INTERNAL MEDICINE
Payer: COMMERCIAL

## 2017-11-29 VITALS
SYSTOLIC BLOOD PRESSURE: 134 MMHG | DIASTOLIC BLOOD PRESSURE: 86 MMHG | WEIGHT: 149.1 LBS | HEIGHT: 65 IN | TEMPERATURE: 97.7 F | HEART RATE: 96 BPM | BODY MASS INDEX: 24.84 KG/M2 | OXYGEN SATURATION: 97 %

## 2017-11-29 DIAGNOSIS — J06.9 VIRAL UPPER RESPIRATORY TRACT INFECTION: ICD-10-CM

## 2017-11-29 DIAGNOSIS — R06.09 DOE (DYSPNEA ON EXERTION): ICD-10-CM

## 2017-11-29 DIAGNOSIS — R35.0 URINARY FREQUENCY: Primary | ICD-10-CM

## 2017-11-29 LAB
ALBUMIN UR-MCNC: NEGATIVE MG/DL
APPEARANCE UR: ABNORMAL
BACTERIA #/AREA URNS HPF: ABNORMAL /HPF
BILIRUB UR QL STRIP: NEGATIVE
COLOR UR AUTO: YELLOW
ERYTHROCYTE [DISTWIDTH] IN BLOOD BY AUTOMATED COUNT: 14.2 % (ref 10–15)
GLUCOSE UR STRIP-MCNC: NEGATIVE MG/DL
HCT VFR BLD AUTO: 39.1 % (ref 35–47)
HGB BLD-MCNC: 12.8 G/DL (ref 11.7–15.7)
HGB UR QL STRIP: NEGATIVE
KETONES UR STRIP-MCNC: NEGATIVE MG/DL
LEUKOCYTE ESTERASE UR QL STRIP: ABNORMAL
MCH RBC QN AUTO: 31.3 PG (ref 26.5–33)
MCHC RBC AUTO-ENTMCNC: 32.7 G/DL (ref 31.5–36.5)
MCV RBC AUTO: 96 FL (ref 78–100)
MUCOUS THREADS #/AREA URNS LPF: PRESENT /LPF
NITRATE UR QL: NEGATIVE
NON-SQ EPI CELLS #/AREA URNS LPF: ABNORMAL /LPF
PH UR STRIP: 6.5 PH (ref 5–7)
PLATELET # BLD AUTO: 272 10E9/L (ref 150–450)
RBC # BLD AUTO: 4.09 10E12/L (ref 3.8–5.2)
RBC #/AREA URNS AUTO: ABNORMAL /HPF
SOURCE: ABNORMAL
SP GR UR STRIP: 1.01 (ref 1–1.03)
UROBILINOGEN UR STRIP-ACNC: 0.2 EU/DL (ref 0.2–1)
WBC # BLD AUTO: 12.7 10E9/L (ref 4–11)
WBC #/AREA URNS AUTO: ABNORMAL /HPF

## 2017-11-29 PROCEDURE — 81001 URINALYSIS AUTO W/SCOPE: CPT | Performed by: INTERNAL MEDICINE

## 2017-11-29 PROCEDURE — 71020 XR CHEST 2 VW: CPT

## 2017-11-29 PROCEDURE — 85027 COMPLETE CBC AUTOMATED: CPT | Performed by: INTERNAL MEDICINE

## 2017-11-29 PROCEDURE — 99214 OFFICE O/P EST MOD 30 MIN: CPT | Performed by: INTERNAL MEDICINE

## 2017-11-29 PROCEDURE — 36415 COLL VENOUS BLD VENIPUNCTURE: CPT | Performed by: INTERNAL MEDICINE

## 2017-11-29 RX ORDER — AMOXICILLIN 500 MG/1
500 CAPSULE ORAL 3 TIMES DAILY
Qty: 30 CAPSULE | Refills: 0 | Status: SHIPPED | OUTPATIENT
Start: 2017-11-29 | End: 2017-12-19

## 2017-11-29 RX ORDER — AZITHROMYCIN 250 MG/1
TABLET, FILM COATED ORAL
Qty: 6 TABLET | Refills: 1 | Status: SHIPPED | OUTPATIENT
Start: 2017-11-29 | End: 2017-12-19

## 2017-11-29 RX ORDER — CODEINE PHOSPHATE AND GUAIFENESIN 10; 100 MG/5ML; MG/5ML
1 SOLUTION ORAL EVERY 4 HOURS PRN
Qty: 120 ML | Refills: 1 | Status: SHIPPED | OUTPATIENT
Start: 2017-11-29 | End: 2018-10-25

## 2017-11-29 NOTE — TELEPHONE ENCOUNTER
Pt calls, she states Dr. Baird mentioned seeing Pulmonologist at appt today. Pt asks if she should try to see them prior to her next appt with Dr. Baird.     Per 11/29/17 visit note:  2. BUNN (dyspnea on exertion)  In spite of fairly normal Spirometry in the past cxr looks like she has something chronic going on. Will see her back in 10 days and likely refer her on to pulmonary      Pt advised Dr. Baird will decide if she needs to see Pulmonary at next appt. Pt verbalizes understanding.

## 2017-11-29 NOTE — MR AVS SNAPSHOT
After Visit Summary   11/29/2017    Cande Baird    MRN: [id number]           Patient Information     Date Of Birth          1935        Visit Information        Provider Department      11/29/2017 1:00 PM Donna Baird MD [facility]        Today's Diagnoses     Urinary frequency    -  1    Viral upper respiratory tract infection        BUNN (dyspnea on exertion)           Follow-ups after your visit        Your next 10 appointments already scheduled     Dec 19, 2017  2:00 PM CST   SHORT with Donna Baird MD   [facility] ([facility])    [address]  [facility] [id number] [phone]              Who to contact     If you have questions or need follow up information about today's clinic visit or your schedule please contact [facility] directly at [phone].  Normal or non-critical lab and imaging results will be communicated to you by MyChart, letter or phone within 4 business days after the clinic has received the results. If you do not hear from us within 7 days, please contact the clinic through MyChart or phone. If you have a critical or abnormal lab result, we will notify you by phone as soon as possible.  Submit refill requests through [employer] or call your pharmacy and they will forward the refill request to us. Please allow 3 business days for your refill to be completed.          Additional Information About Your Visit        MyChart Information     [employer] gives you secure access to your electronic health record. If you see a primary care provider, you can also send messages to your care team and make appointments. If you have questions, please call your primary care clinic.  If you do not have a primary care provider, please call [phone] and they will assist you.        Care EveryWhere ID     This is your Care EveryWhere ID. This could be used by other organizations to access your  "[facility] medical records  [id number]        Your Vitals Were     Pulse Temperature Height Pulse Oximetry Breastfeeding? BMI (Body Mass Index)    96 97.7  F (36.5  C) (Oral) 5' 5\" (1.651 m) 97% No 24.81 kg/m2       Blood Pressure from Last 3 Encounters:   11/29/17 134/86   08/15/17 118/68   12/28/16 112/72    Weight from Last 3 Encounters:   11/29/17 149 lb 1.6 oz (67.6 kg)   08/15/17 146 lb 4.8 oz (66.4 kg)   12/28/16 147 lb (66.7 kg)              We Performed the Following     CBC with platelets     UA reflex to Microscopic and Culture     Urine Microscopic          Today's Medication Changes          These changes are accurate as of: 11/29/17  2:13 PM.  If you have any questions, ask your nurse or doctor.               Start taking these medicines.        Dose/Directions    amoxicillin 500 MG capsule   Commonly known as:  AMOXIL   Used for:  Viral upper respiratory tract infection   Started by:  Donna Baird MD        Dose:  500 mg   Take 1 capsule (500 mg) by mouth 3 times daily   Quantity:  30 capsule   Refills:  0       guaiFENesin-codeine 100-10 MG/5ML Soln solution   Commonly known as:  ROBITUSSIN AC   Used for:  Viral upper respiratory tract infection   Started by:  Donna Baird MD        Dose:  1 tsp.   Take 5 mLs by mouth every 4 hours as needed for cough   Quantity:  120 mL   Refills:  1            Where to get your medicines      These medications were sent to [facility] [address] [place] [address] [facility]  [address] [facility] [address]     Phone:  [phone]     amoxicillin 500 MG capsule         Some of these will need a paper prescription and others can be bought over the counter.  Ask your nurse if you have questions.     Bring a paper prescription for each of these medications     guaiFENesin-codeine 100-10 MG/5ML Soln solution                Primary Care Provider Office Phone # Fax #    Donna Baird [phone]       [address]  [facility] [address]        Equal Access to Services     [facility] ANABELLE : Hadii aad ku haddeshawnponce Sojaredali, waaxda luqadaha, qaybta kaalmaelmer brown. So [facility] [phone].    ATENCIÓN: Si habla español, tiene a escobar disposición servicios gratuitos de asistencia lingüística. Llame al [phone].    We comply with applicable federal civil rights laws and [place] laws. We do not discriminate on the basis of race, color, national origin, age, disability, sex, sexual orientation, or gender identity.            Thank you!     Thank you for choosing [facility]  for your care. Our goal is always to provide you with excellent care. Hearing back from our patients is one way we can continue to improve our services. Please take a few minutes to complete the written survey that you may receive in the mail after your visit with us. Thank you!             Your Updated Medication List - Protect others around you: Learn how to safely use, store and throw away your medicines at www.disposemymeds.org.          This list is accurate as of: 11/29/17  2:13 PM.  Always use your most recent med list.                   Brand Name Dispense Instructions for use Diagnosis    amoxicillin 500 MG capsule    AMOXIL    30 capsule    Take 1 capsule (500 mg) by mouth 3 times daily    Viral upper respiratory tract infection       aspirin 81 MG tablet     30 tablet    Take by mouth daily    S/P kidney transplant       azaTHIOprine 50 MG tablet    IMURAN    7 tablet    Take 1 tablet (50 mg) by mouth daily    Kidney transplant recipient, Long-term use of immunosuppressant medication       cycloSPORINE modified 100 MG capsule    GENERIC EQUIVALENT    90 capsule    Take 1 capsule (100 mg) by mouth daily    Kidney transplant recipient, Long-term use of immunosuppressant medication       GLUCOSAMINE SULFATE PO      Take 1,000 mg by mouth 2 times daily Takes 1000mg in the morning and 1000mg in the  evening        guaiFENesin-codeine 100-10 MG/5ML Soln solution    ROBITUSSIN AC    120 mL    Take 5 mLs by mouth every 4 hours as needed for cough    Viral upper respiratory tract infection       saccharomyces boulardii 250 MG capsule    FLORASTOR     Take 250 mg by mouth daily        vitamin B complex with vitamin C Tabs tablet      Take 1 tablet by mouth daily        VITAMIN D3 PO      Take 2,000 Units by mouth daily

## 2017-11-29 NOTE — NURSING NOTE
"Chief Complaint   Patient presents with     RECHECK      in room.     Urgent Care       Initial /86 (BP Location: Right arm, Patient Position: Chair, Cuff Size: Adult Large)  Pulse 96  Temp 97.7  F (36.5  C) (Oral)  Ht 5' 5\" (1.651 m)  Wt 149 lb 1.6 oz (67.6 kg)  SpO2 97%  Breastfeeding? No  BMI 24.81 kg/m2 Estimated body mass index is 24.81 kg/(m^2) as calculated from the following:    Height as of this encounter: 5' 5\" (1.651 m).    Weight as of this encounter: 149 lb 1.6 oz (67.6 kg).  Medication Reconciliation: complete    "

## 2017-11-29 NOTE — PROGRESS NOTES
SUBJECTIVE:   Cande Baird is a 82 year old female who presents to clinic today for the following health issues:    ED/UC Followup:    Facility:  [facility]  Date of visit: 11-  Reason for visit: chills & cough with hx UTI's         HPI:   She is bringing up yellow phlegm. No wheezing. She has chronic dyspnea on exertion little change there but she is very tired. 5 days ago was seen in UC and given Cipro for possible UTI and bronchitis. She has no UTI symptoms currently but cough is not better. No orthopnea or PND. No lower extremity edema.     Problem list and histories reviewed & adjusted, as indicated.  Additional history: as documented    Patient Active Problem List   Diagnosis     Long-term use of immunosuppressant medication     Immunosuppressed status (H)     S/P kidney transplant     Arthritis     Secondary hyperparathyroidism of renal origin (H)      Advance Care Planning     Past Surgical History:   Procedure Laterality Date     COLONOSCOPY  5/15/2013     LAPAROSCOPIC CYSTECTOMY OVARIAN (BENIGN)       TRANSPLANT  October 17, 1987    Kidney       Social History   Substance Use Topics     Smoking status: Former Smoker     Types: Cigarettes     Quit date: 5/25/1988     Smokeless tobacco: Never Used     Alcohol use 0.0 oz/week     0 Standard drinks or equivalent per week     Family History   Problem Relation Age of Onset     DIABETES Mother 60     Peptic Ulcer Disease Father      DIABETES Sister      Rheumatoid Arthritis Sister      DIABETES Sister              Reviewed and updated as needed this visit by clinical staffTobacco  Allergies       Reviewed and updated as needed this visit by Provider         ROS:  Constitutional, HEENT, cardiovascular, pulmonary, GI, , musculoskeletal, neuro, skin, endocrine and psych systems are negative, except as otherwise noted.      OBJECTIVE:   /86 (BP Location: Right arm, Patient Position: Chair, Cuff Size: Adult Large)  Pulse 96  Temp  "97.7  F (36.5  C) (Oral)  Ht 5' 5\" (1.651 m)  Wt 149 lb 1.6 oz (67.6 kg)  SpO2 97%  Breastfeeding? No  BMI 24.81 kg/m2  Body mass index is 24.81 kg/(m^2).  GENERAL: healthy, alert and no distress  EYES: Eyes grossly normal to inspection, PERRL and conjunctivae and sclerae normal  HENT: ear canals and TM's normal, nose and mouth without ulcers or lesions  NECK: no adenopathy, no asymmetry, masses, or scars and thyroid normal to palpation  RESP: crackles at right lung base, no wheezing   CV: regular rate and rhythm, normal S1 S2, no S3 or S4, no murmur, click or rub, no peripheral edema and peripheral pulses strong  ABDOMEN: soft, nontender, no hepatosplenomegaly, no masses and bowel sounds normal  MS: no gross musculoskeletal defects noted, no edema    CHEST X-RAY: ? pneumonia vs chronic changes right lower lobe   WBC pending   UA normal     ASSESSMENT/PLAN:       1. Viral upper respiratory tract infection  Will rx with Amoxicillin and Rx given for Robitussen AC. Follow up in 10 days  - XR Chest 2 Views; Future  - CBC with platelets  - amoxicillin (AMOXIL) 500 MG capsule; Take 1 capsule (500 mg) by mouth 3 times daily  Dispense: 30 capsule; Refill: 0  - guaiFENesin-codeine (ROBITUSSIN AC) 100-10 MG/5ML SOLN solution; Take 5 mLs by mouth every 4 hours as needed for cough  Dispense: 120 mL; Refill: 1    2. BUNN (dyspnea on exertion)  In spite of fairly normal Spirometry in the past cxr looks like she has something chronic going on. Will see her back in 10 days and likely refer her on to pulmonary     3. Urinary frequency     - UA reflex to Microscopic and Culture  - Urine Microscopic      Donna Baird MD  [facility]  "

## 2017-12-19 ENCOUNTER — OFFICE VISIT (OUTPATIENT)
Dept: INTERNAL MEDICINE | Facility: CLINIC | Age: 82
End: 2017-12-19
Payer: COMMERCIAL

## 2017-12-19 VITALS
OXYGEN SATURATION: 97 % | SYSTOLIC BLOOD PRESSURE: 120 MMHG | BODY MASS INDEX: 24.66 KG/M2 | HEIGHT: 65 IN | TEMPERATURE: 97.8 F | WEIGHT: 148 LBS | DIASTOLIC BLOOD PRESSURE: 76 MMHG | HEART RATE: 133 BPM

## 2017-12-19 DIAGNOSIS — Z23 NEED FOR TDAP VACCINATION: ICD-10-CM

## 2017-12-19 DIAGNOSIS — R06.09 DOE (DYSPNEA ON EXERTION): Primary | ICD-10-CM

## 2017-12-19 DIAGNOSIS — Z23 NEED FOR 23-POLYVALENT PNEUMOCOCCAL POLYSACCHARIDE VACCINE: ICD-10-CM

## 2017-12-19 PROCEDURE — 90715 TDAP VACCINE 7 YRS/> IM: CPT | Performed by: INTERNAL MEDICINE

## 2017-12-19 PROCEDURE — G0009 ADMIN PNEUMOCOCCAL VACCINE: HCPCS | Performed by: INTERNAL MEDICINE

## 2017-12-19 PROCEDURE — 99214 OFFICE O/P EST MOD 30 MIN: CPT | Mod: 25 | Performed by: INTERNAL MEDICINE

## 2017-12-19 PROCEDURE — 90732 PPSV23 VACC 2 YRS+ SUBQ/IM: CPT | Performed by: INTERNAL MEDICINE

## 2017-12-19 PROCEDURE — 90471 IMMUNIZATION ADMIN: CPT | Mod: 59 | Performed by: INTERNAL MEDICINE

## 2017-12-19 NOTE — MR AVS SNAPSHOT
After Visit Summary   12/19/2017    Cande Baird    MRN: [id number]           Patient Information     Date Of Birth          1935        Visit Information        Provider Department      12/19/2017 2:00 PM Donna Baird MD [facility]        Today's Diagnoses     BUNN (dyspnea on exertion)    -  1       Follow-ups after your visit        Additional Services     PULMONARY MEDICINE REFERRAL       Your provider has referred you to: N: [facility] [phone]   [email or web address]    Please be aware that coverage of these services is subject to the terms and limitations of your health insurance plan.  Call member services at your health plan with any benefit or coverage questions.      Please bring the following with you to your appointment:    (1) Any X-Rays, CTs or MRIs which have been performed.  Contact the facility where they were done to arrange for  prior to your scheduled appointment.    (2) List of current medications   (3) This referral request   (4) Any documents/labs given to you for this referral                  Who to contact     If you have questions or need follow up information about today's clinic visit or your schedule please contact [facility] directly at [phone].  Normal or non-critical lab and imaging results will be communicated to you by MyChart, letter or phone within 4 business days after the clinic has received the results. If you do not hear from us within 7 days, please contact the clinic through MyChart or phone. If you have a critical or abnormal lab result, we will notify you by phone as soon as possible.  Submit refill requests through [employer] or call your pharmacy and they will forward the refill request to us. Please allow 3 business days for your refill to be completed.          Additional Information About Your Visit        MyChart Information     [employer] gives you secure access to your  "electronic health record. If you see a primary care provider, you can also send messages to your care team and make appointments. If you have questions, please call your primary care clinic.  If you do not have a primary care provider, please call [phone] and they will assist you.        Care EveryWhere ID     This is your Care EveryWhere ID. This could be used by other organizations to access your [facility] medical records  [id number]        Your Vitals Were     Pulse Temperature Height Pulse Oximetry BMI (Body Mass Index)       133 97.8  F (36.6  C) (Oral) 5' 5\" (1.651 m) 97% 24.63 kg/m2        Blood Pressure from Last 3 Encounters:   12/19/17 120/76   11/29/17 134/86   08/15/17 118/68    Weight from Last 3 Encounters:   12/19/17 148 lb (67.1 kg)   11/29/17 149 lb 1.6 oz (67.6 kg)   08/15/17 146 lb 4.8 oz (66.4 kg)              We Performed the Following     PULMONARY MEDICINE REFERRAL          Today's Medication Changes          These changes are accurate as of: 12/19/17  2:30 PM.  If you have any questions, ask your nurse or doctor.               Stop taking these medicines if you haven't already. Please contact your care team if you have questions.     amoxicillin 500 MG capsule   Commonly known as:  AMOXIL   Stopped by:  Donna Baird MD           azithromycin 250 MG tablet   Commonly known as:  ZITHROMAX   Stopped by:  Dnona Baird MD                    Primary Care Provider Office Phone # Fax #    Donna Baird [phone]       [address]  [facility] [address]        Equal Access to Services     [facility]: Hadii romario morales hadesteban Sojb, waaxda luqadaha, qaybta kaalmaelmer brown. So [facility] [phone].    ATENCIÓN: Si habla español, tiene a escobar disposición servicios gratuitos de asistencia lingüística. Brianame al [phone].    We comply with applicable federal civil rights laws and [place] laws. We do " not discriminate on the basis of race, color, national origin, age, disability, sex, sexual orientation, or gender identity.            Thank you!     Thank you for choosing [facility]  for your care. Our goal is always to provide you with excellent care. Hearing back from our patients is one way we can continue to improve our services. Please take a few minutes to complete the written survey that you may receive in the mail after your visit with us. Thank you!             Your Updated Medication List - Protect others around you: Learn how to safely use, store and throw away your medicines at www.disposemymeds.org.          This list is accurate as of: 12/19/17  2:30 PM.  Always use your most recent med list.                   Brand Name Dispense Instructions for use Diagnosis    aspirin 81 MG tablet     30 tablet    Take by mouth daily    S/P kidney transplant       azaTHIOprine 50 MG tablet    IMURAN    7 tablet    Take 1 tablet (50 mg) by mouth daily    Kidney transplant recipient, Long-term use of immunosuppressant medication       cycloSPORINE modified 100 MG capsule    GENERIC EQUIVALENT    90 capsule    Take 1 capsule (100 mg) by mouth daily    Kidney transplant recipient, Long-term use of immunosuppressant medication       GLUCOSAMINE SULFATE PO      Take 1,000 mg by mouth 2 times daily Takes 1000mg in the morning and 1000mg in the evening        guaiFENesin-codeine 100-10 MG/5ML Soln solution    ROBITUSSIN AC    120 mL    Take 5 mLs by mouth every 4 hours as needed for cough    Viral upper respiratory tract infection       saccharomyces boulardii 250 MG capsule    FLORASTOR     Take 250 mg by mouth daily        vitamin B complex with vitamin C Tabs tablet      Take 1 tablet by mouth daily        VITAMIN D3 PO      Take 2,000 Units by mouth daily

## 2017-12-19 NOTE — NURSING NOTE
"Chief Complaint   Patient presents with     RECHECK   viral infection   Initial /76  Pulse 133  Temp 97.8  F (36.6  C) (Oral)  Ht 5' 5\" (1.651 m)  Wt 148 lb (67.1 kg)  SpO2 97%  BMI 24.63 kg/m2 Estimated body mass index is 24.63 kg/(m^2) as calculated from the following:    Height as of this encounter: 5' 5\" (1.651 m).    Weight as of this encounter: 148 lb (67.1 kg).  Medication Reconciliation: complete    "

## 2017-12-19 NOTE — PROGRESS NOTES
"  SUBJECTIVE:                                                    Cande Baird is a 82 year old female who presents to clinic today for the following health issues:  Follow up visit 11/29/17, continues with exhaustion .      HPI:   She says she is just exhausted after walking for 10 minutes. She has has to stop 1/2 way through making a meal. She cannot pin point when it started. Says has been coming on gradual for at leats 3 years. Cxr, EKG, echo, PFTs and labs have been unremarkable. Appetite is good and weight is stable. Denies any fever chills or night sweats. Denies any problem with chest pain, pressure or dyspnea on exertion. Denies bowel or bladder changes. She wants to see a pulmonary specialist.      Problem list and histories reviewed & adjusted, as indicated.  Additional history: as documented    Patient Active Problem List   Diagnosis     Long-term use of immunosuppressant medication     Immunosuppressed status (H)     S/P kidney transplant     Arthritis     Secondary hyperparathyroidism of renal origin (H)      Advance Care Planning     Past Surgical History:   Procedure Laterality Date     COLONOSCOPY  5/15/2013     LAPAROSCOPIC CYSTECTOMY OVARIAN (BENIGN)       TRANSPLANT  October 17, 1987    Kidney       Social History   Substance Use Topics     Smoking status: Former Smoker     Types: Cigarettes     Quit date: 5/25/1988     Smokeless tobacco: Never Used     Alcohol use 0.0 oz/week     0 Standard drinks or equivalent per week     Family History   Problem Relation Age of Onset     DIABETES Mother 60     Peptic Ulcer Disease Father      DIABETES Sister      Rheumatoid Arthritis Sister      DIABETES Sister              ROS:  Constitutional, HEENT, cardiovascular, pulmonary, GI, , musculoskeletal, neuro, skin, endocrine and psych systems are negative, except as otherwise noted.      OBJECTIVE:   /76  Pulse 133  Temp 97.8  F (36.6  C) (Oral)  Ht 5' 5\" (1.651 m)  Wt 148 lb (67.1 kg)  SpO2 97%  " BMI 24.63 kg/m2  Body mass index is 24.63 kg/(m^2).  GENERAL: healthy, alert and no distress  EYES: Eyes grossly normal to inspection, PERRL and conjunctivae and sclerae normal  HENT: ear canals and TM's normal, nose and mouth without ulcers or lesions  NECK: no adenopathy, no asymmetry, masses, or scars and thyroid normal to palpation  RESP: lungs clear to auscultation - no rales, rhonchi or wheezes  CV: regular rate and rhythm, normal S1 S2, no S3 or S4, no murmur, click or rub, no peripheral edema and peripheral pulses strong  ABDOMEN: soft, nontender, no hepatosplenomegaly, no masses and bowel sounds normal  MS: no gross musculoskeletal defects noted, no edema  NEURO: Normal strength and tone, mentation intact and speech normal  PSYCH: mentation appears normal and affect flat      ASSESSMENT/PLAN:       1. BUNN (dyspnea on exertion)  Has more fatigue than true shortness of breath but will refer her to pulmonary for an opinion. I do think she is depressed but do not think that is the only reason she is so tired  - PULMONARY MEDICINE REFERRAL    2. Need for Tdap vaccination     - TDAP VACCINE (ADACEL)    3. Need for 23-polyvalent pneumococcal polysaccharide vaccine     - PNEUMOCOCCAL VACCINE,ADULT,SQ OR IM      Donna Baird MD  [facility]

## 2017-12-22 ENCOUNTER — TELEPHONE (OUTPATIENT)
Dept: TRANSPLANT | Facility: CLINIC | Age: 82
End: 2017-12-22

## 2017-12-22 DIAGNOSIS — Z94.0 KIDNEY TRANSPLANT RECIPIENT: ICD-10-CM

## 2017-12-22 DIAGNOSIS — Z94.0 KIDNEY REPLACED BY TRANSPLANT: Primary | ICD-10-CM

## 2017-12-22 DIAGNOSIS — Z79.60 LONG-TERM USE OF IMMUNOSUPPRESSANT MEDICATION: ICD-10-CM

## 2017-12-22 RX ORDER — AZATHIOPRINE 50 MG/1
TABLET ORAL
Qty: 90 TABLET | Refills: 2 | Status: SHIPPED | OUTPATIENT
Start: 2017-12-22 | End: 2018-09-11

## 2017-12-26 ENCOUNTER — TELEPHONE (OUTPATIENT)
Dept: TRANSPLANT | Facility: CLINIC | Age: 82
End: 2017-12-26

## 2017-12-26 RX ORDER — CYCLOSPORINE 100 MG/1
CAPSULE ORAL
Qty: 90 CAPSULE | Refills: 0 | Status: SHIPPED | OUTPATIENT
Start: 2017-12-26 | End: 2018-04-18

## 2017-12-26 NOTE — TELEPHONE ENCOUNTER
Patient received letter from Anu regarding scheduling an appt with Nephrology before medications can be refilled. Patient stated she sees a Nephrologist with [facility] now. Please call patient to discuss.

## 2017-12-26 NOTE — TELEPHONE ENCOUNTER
Call placed to patient: Patient confirms that she is followed by a nephrologist outside of  that will be refilling her IS medication going forward.

## 2017-12-27 ENCOUNTER — TRANSFERRED RECORDS (OUTPATIENT)
Dept: HEALTH INFORMATION MANAGEMENT | Facility: CLINIC | Age: 82
End: 2017-12-27

## 2018-03-14 ENCOUNTER — OFFICE VISIT (OUTPATIENT)
Dept: INTERNAL MEDICINE | Facility: CLINIC | Age: 83
End: 2018-03-14
Payer: COMMERCIAL

## 2018-03-14 VITALS
RESPIRATION RATE: 16 BRPM | DIASTOLIC BLOOD PRESSURE: 76 MMHG | OXYGEN SATURATION: 94 % | TEMPERATURE: 98 F | HEIGHT: 65 IN | HEART RATE: 80 BPM | SYSTOLIC BLOOD PRESSURE: 104 MMHG | WEIGHT: 150.5 LBS | BODY MASS INDEX: 25.08 KG/M2

## 2018-03-14 DIAGNOSIS — D84.9 IMMUNOSUPPRESSED STATUS (H): ICD-10-CM

## 2018-03-14 DIAGNOSIS — Z00.00 HEALTH CARE MAINTENANCE: ICD-10-CM

## 2018-03-14 DIAGNOSIS — L98.9 SKIN LESION OF FACE: Primary | ICD-10-CM

## 2018-03-14 PROCEDURE — 99213 OFFICE O/P EST LOW 20 MIN: CPT | Performed by: FAMILY MEDICINE

## 2018-03-14 NOTE — PROGRESS NOTES
"  SUBJECTIVE:   Cande Baird is a 82 year old female who presents to clinic today for the following health issues:    Check lump on skin.      HISTORY    She has a lump near L temple for a couple of months. She has a h/o skin CA's in past.    She is on immunosuppressive medication.    Patient Active Problem List   Diagnosis     Long-term use of immunosuppressant medication     Immunosuppressed status (H)     S/P kidney transplant     Arthritis     Secondary hyperparathyroidism of renal origin (H)      Advance Care Planning     Current Outpatient Prescriptions   Medication Sig Dispense Refill     GENGRAF (BRAND) 100 MG CAPSULE TAKE 1 CAPSULE DAILY 90 capsule 0     azaTHIOprine (IMURAN) 50 MG tablet TAKE 1 TABLET DAILY 90 tablet 2     cycloSPORINE modified (GENERIC EQUIVALENT) 100 MG capsule Take 1 capsule (100 mg) by mouth daily 90 capsule 3     saccharomyces boulardii (FLORASTOR) 250 MG capsule Take 250 mg by mouth daily       vitamin  B complex with vitamin C (VITAMIN  B COMPLEX) TABS Take 1 tablet by mouth daily       Cholecalciferol (VITAMIN D3 PO) Take 2,000 Units by mouth daily       GLUCOSAMINE SULFATE PO Take 1,000 mg by mouth 2 times daily Takes 1000mg in the morning and 1000mg in the evening       aspirin 81 MG tablet Take by mouth daily 30 tablet      guaiFENesin-codeine (ROBITUSSIN AC) 100-10 MG/5ML SOLN solution Take 5 mLs by mouth every 4 hours as needed for cough (Patient not taking: Reported on 3/14/2018) 120 mL 1       REVIEW OF SYSTEMS    Unremarkable except as above.      Past Medical History:   Diagnosis Date     Arthritis      Glomerulonephritis      Renal transplant recipient 1987     Skin cancer     does not see dermatologist       EXAM  /76 (BP Location: Left arm, Patient Position: Sitting, Cuff Size: Adult Regular)  Pulse 80  Temp 98  F (36.7  C) (Oral)  Resp 16  Ht 5' 5\" (1.651 m)  Wt 150 lb 8 oz (68.3 kg)  SpO2 94%  BMI 25.04 kg/m2      Skin:  Raised, circular 14 mm area with " central scaling. Suspicious        (L98.9) Skin lesion of face  (primary encounter diagnosis)  Comment: see Derm  Plan:     (Z00.00) Health care maintenance  Comment:   Plan: DX Hip/Pelvis/Spine            (D89.9) Immunosuppressed status (H)  Comment:   Plan:         You may contact your previous Derm provider.    Or [facility] in [facility] [phone].

## 2018-03-14 NOTE — NURSING NOTE
"Chief Complaint   Patient presents with     Derm Problem     bump on left side of head (temple area).       Initial /76 (BP Location: Left arm, Patient Position: Sitting, Cuff Size: Adult Regular)  Pulse 80  Temp 98  F (36.7  C) (Oral)  Resp 16  Ht 5' 5\" (1.651 m)  Wt 150 lb 8 oz (68.3 kg)  SpO2 94%  BMI 25.04 kg/m2 Estimated body mass index is 25.04 kg/(m^2) as calculated from the following:    Height as of this encounter: 5' 5\" (1.651 m).    Weight as of this encounter: 150 lb 8 oz (68.3 kg).  Medication Reconciliation: complete    "

## 2018-03-14 NOTE — MR AVS SNAPSHOT
After Visit Summary   3/14/2018    Cande Baird    MRN: [id number]           Patient Information     Date Of Birth          1935        Visit Information        Provider Department      3/14/2018 1:00 PM Layo Arora MD [facility]        Today's Diagnoses     Health care maintenance    -  1    Skin lesion of face        Immunosuppressed status (H)          Care Instructions      You may contact your previous Derm provider.    Or [facility] in [place] is [phone].          Follow-ups after your visit        Your next 10 appointments already scheduled     Mar 29, 2018  2:00 PM CDT   DX HIP/PELVIS/SPINE with RIDX1   [facility] ([facility])    [address]  [address]  [facility] [address]              Please do not take any of the following 24 hours prior to the day of your exam: vitamins, calcium tablets, antacids.  If possible, please wear clothes without metal (snaps, zippers). A sweatsuit works well.              Future tests that were ordered for you today     Open Future Orders        Priority Expected Expires Ordered    DX Hip/Pelvis/Spine Routine  3/14/2019 3/14/2018            Who to contact     If you have questions or need follow up information about today's clinic visit or your schedule please contact [facility] directly at [phone].  Normal or non-critical lab and imaging results will be communicated to you by MyChart, letter or phone within 4 business days after the clinic has received the results. If you do not hear from us within 7 days, please contact the clinic through MyChart or phone. If you have a critical or abnormal lab result, we will notify you by phone as soon as possible.  Submit refill requests through [employer] or call your pharmacy and they will forward the refill request to us. Please allow 3 business days for your refill to be completed.          Additional Information  "About Your Visit        MyChart Information     [employer] gives you secure access to your electronic health record. If you see a primary care provider, you can also send messages to your care team and make appointments. If you have questions, please call your primary care clinic.  If you do not have a primary care provider, please call [phone] and they will assist you.        Care EveryWhere ID     This is your Care EveryWhere ID. This could be used by other organizations to access your [place] medical records  [id number]        Your Vitals Were     Pulse Temperature Respirations Height Pulse Oximetry BMI (Body Mass Index)    80 98  F (36.7  C) (Oral) 16 5' 5\" (1.651 m) 94% 25.04 kg/m2       Blood Pressure from Last 3 Encounters:   03/14/18 104/76   12/19/17 120/76   11/29/17 134/86    Weight from Last 3 Encounters:   03/14/18 150 lb 8 oz (68.3 kg)   12/19/17 148 lb (67.1 kg)   11/29/17 149 lb 1.6 oz (67.6 kg)               Primary Care Provider Office Phone # Fax #    Donna Baird [phone]       [address]  [facility] [address]        Equal Access to Services     TEE AHN AH: Hadii aad ku hadasho Soomaali, waaxda luqadaha, qaybta kaalmada adeegyada, waxay idiin hayaan adeeg kharash la'rosanan . So [facility] [phone].    ATENCIÓN: Si habla español, tiene a escobar disposición servicios gratuitos de asistencia lingüística. Llame al [phone].    We comply with applicable federal civil rights laws and [place] laws. We do not discriminate on the basis of race, color, national origin, age, disability, sex, sexual orientation, or gender identity.            Thank you!     Thank you for choosing [facility]  for your care. Our goal is always to provide you with excellent care. Hearing back from our patients is one way we can continue to improve our services. Please take a few minutes to complete the written survey that you may receive in the mail after your " visit with us. Thank you!             Your Updated Medication List - Protect others around you: Learn how to safely use, store and throw away your medicines at www.disposemymeds.org.          This list is accurate as of 3/14/18  1:52 PM.  Always use your most recent med list.                   Brand Name Dispense Instructions for use Diagnosis    aspirin 81 MG tablet     30 tablet    Take by mouth daily    S/P kidney transplant       azaTHIOprine 50 MG tablet    IMURAN    90 tablet    TAKE 1 TABLET DAILY    Kidney transplant recipient, Long-term use of immunosuppressant medication       * cycloSPORINE modified 100 MG capsule    GENERIC EQUIVALENT    90 capsule    Take 1 capsule (100 mg) by mouth daily    Kidney transplant recipient, Long-term use of immunosuppressant medication       * cycloSPORINE modified 100 MG capsule     90 capsule    TAKE 1 CAPSULE DAILY    Kidney replaced by transplant       GLUCOSAMINE SULFATE PO      Take 1,000 mg by mouth 2 times daily Takes 1000mg in the morning and 1000mg in the evening        guaiFENesin-codeine 100-10 MG/5ML Soln solution    ROBITUSSIN AC    120 mL    Take 5 mLs by mouth every 4 hours as needed for cough    Viral upper respiratory tract infection       saccharomyces boulardii 250 MG capsule    FLORASTOR     Take 250 mg by mouth daily        vitamin B complex with vitamin C Tabs tablet      Take 1 tablet by mouth daily        VITAMIN D3 PO      Take 2,000 Units by mouth daily        * Notice:  This list has 2 medication(s) that are the same as other medications prescribed for you. Read the directions carefully, and ask your doctor or other care provider to review them with you.

## 2018-03-29 ENCOUNTER — RADIANT APPOINTMENT (OUTPATIENT)
Dept: BONE DENSITY | Facility: CLINIC | Age: 83
End: 2018-03-29
Attending: FAMILY MEDICINE
Payer: COMMERCIAL

## 2018-03-29 DIAGNOSIS — Z00.00 HEALTH CARE MAINTENANCE: ICD-10-CM

## 2018-03-29 PROCEDURE — 77085 DXA BONE DENSITY AXL VRT FX: CPT | Performed by: INTERNAL MEDICINE

## 2018-04-18 ENCOUNTER — TELEPHONE (OUTPATIENT)
Dept: TRANSPLANT | Facility: CLINIC | Age: 83
End: 2018-04-18

## 2018-04-18 DIAGNOSIS — Z94.0 KIDNEY REPLACED BY TRANSPLANT: Primary | ICD-10-CM

## 2018-04-18 RX ORDER — CYCLOSPORINE 100 MG/1
100 CAPSULE ORAL DAILY
Qty: 90 CAPSULE | Refills: 0 | Status: SHIPPED | OUTPATIENT
Start: 2018-04-18 | End: 2019-09-25 | Stop reason: DRUGHIGH

## 2018-04-18 NOTE — TELEPHONE ENCOUNTER
The federal rules and regulations that govern the refilling of medications by physicians and pharmacies no longer allow us to refill mediations without a yearly face-to-face visit between the patient and physician.      LPN task:  Call and invite back for Annual face-to-face appointment with Transplant Nephrology.  Send a message to Monica Swenson to set-up visit.  If requesting Clinic Visit at [facility] in [place] or [place], send message to Kristin King to set-up.

## 2018-04-19 NOTE — TELEPHONE ENCOUNTER
Spoke to patient who states that she sees Dr. Díaz, Nephrologist with [facility] in [place].  Patient states that this physician already fills her meds.

## 2018-05-21 ENCOUNTER — TELEPHONE (OUTPATIENT)
Dept: INTERNAL MEDICINE | Facility: CLINIC | Age: 83
End: 2018-05-21

## 2018-05-21 NOTE — TELEPHONE ENCOUNTER
Patient calls stating she has a bladder infection and wants labs ordered for a urine test. Advised patient to initiate an E-visit.     Please start an eVisit so your provider can fully address your needs. If you need a more immediate response you can complete an OnCare visit with another [place] provider.        eVisit through [employer] - simply log in to your MyChart (must be on a computer or laptop this is not currently available via smartphone), select the Online care button and request an eVisit. The provider will respond to your visit within one business day. The cost for an eVisit is $35.00.    OnCare - Available 24/7 with a response from an available provider within an hour of completing your questionnaire. Cost is $45, just visit [email or web address].      If you prefer a traditional in person office visit, please schedule through [employer] or call us at [phone].    Patient declined all 3 options, evisit, [email or web address], and clinic visit. Patient states will call her kidney specialist and hangs up.

## 2018-09-11 DIAGNOSIS — Z94.0 KIDNEY TRANSPLANT RECIPIENT: Primary | ICD-10-CM

## 2018-09-11 DIAGNOSIS — Z79.60 LONG-TERM USE OF IMMUNOSUPPRESSANT MEDICATION: ICD-10-CM

## 2018-09-11 RX ORDER — AZATHIOPRINE 50 MG/1
50 TABLET ORAL DAILY
Qty: 90 TABLET | Refills: 0 | Status: SHIPPED | OUTPATIENT
Start: 2018-09-11 | End: 2022-01-01

## 2018-09-11 NOTE — TELEPHONE ENCOUNTER
Call placed to patient. Patient notes that her azathioprine is refilled by Dr. Díaz at [facility]

## 2018-10-25 ENCOUNTER — OFFICE VISIT (OUTPATIENT)
Dept: INTERNAL MEDICINE | Facility: CLINIC | Age: 83
End: 2018-10-25
Payer: COMMERCIAL

## 2018-10-25 ENCOUNTER — OFFICE VISIT (OUTPATIENT)
Dept: BEHAVIORAL HEALTH | Facility: CLINIC | Age: 83
End: 2018-10-25
Payer: COMMERCIAL

## 2018-10-25 VITALS
OXYGEN SATURATION: 96 % | WEIGHT: 146.4 LBS | BODY MASS INDEX: 24.39 KG/M2 | SYSTOLIC BLOOD PRESSURE: 120 MMHG | RESPIRATION RATE: 14 BRPM | DIASTOLIC BLOOD PRESSURE: 76 MMHG | HEART RATE: 133 BPM | HEIGHT: 65 IN | TEMPERATURE: 97.9 F

## 2018-10-25 DIAGNOSIS — N25.81 SECONDARY HYPERPARATHYROIDISM OF RENAL ORIGIN (H): ICD-10-CM

## 2018-10-25 DIAGNOSIS — C44.529 SQUAMOUS CELL CARCINOMA OF SKIN OF CHEST: ICD-10-CM

## 2018-10-25 DIAGNOSIS — Z00.00 ENCOUNTER FOR ROUTINE ADULT HEALTH EXAMINATION WITHOUT ABNORMAL FINDINGS: Primary | ICD-10-CM

## 2018-10-25 DIAGNOSIS — F41.9 ANXIETY: ICD-10-CM

## 2018-10-25 PROCEDURE — 99207 ZZC NO CHARGE BEHAVIORAL WARM HANDOFF: CPT | Performed by: MARRIAGE & FAMILY THERAPIST

## 2018-10-25 PROCEDURE — G0439 PPPS, SUBSEQ VISIT: HCPCS | Performed by: INTERNAL MEDICINE

## 2018-10-25 NOTE — MR AVS SNAPSHOT
After Visit Summary   10/25/2018    Cande Baird    MRN: [id number]           Patient Information     Date Of Birth          1935        Visit Information        Provider Department      10/25/2018 3:30 PM Sneha Amador LMFT [facility]        Today's Diagnoses     Anxiety           Follow-ups after your visit        Your next 10 appointments already scheduled     Oct 25, 2018  3:30 PM CDT   New Visit with AIDA Villatoro   [facility] ([facility])    [address]  [facility] [address] [phone]              Who to contact     If you have questions or need follow up information about today's clinic visit or your schedule please contact [facility] directly at [phone].  Normal or non-critical lab and imaging results will be communicated to you by MyChart, letter or phone within 4 business days after the clinic has received the results. If you do not hear from us within 7 days, please contact the clinic through [employer] or phone. If you have a critical or abnormal lab result, we will notify you by phone as soon as possible.  Submit refill requests through [employer] or call your pharmacy and they will forward the refill request to us. Please allow 3 business days for your refill to be completed.          Additional Information About Your Visit        MyChart Information     [employer] gives you secure access to your electronic health record. If you see a primary care provider, you can also send messages to your care team and make appointments. If you have questions, please call your primary care clinic.  If you do not have a primary care provider, please call [phone] and they will assist you.        Care EveryWhere ID     This is your Care EveryWhere ID. This could be used by other organizations to access your [place] medical records  [id number]         Blood Pressure from Last 3 Encounters:    10/25/18 120/76   03/14/18 104/76   12/19/17 120/76    Weight from Last 3 Encounters:   10/25/18 66.4 kg (146 lb 6.4 oz)   03/14/18 68.3 kg (150 lb 8 oz)   12/19/17 67.1 kg (148 lb)              Today, you had the following     No orders found for display       Primary Care Provider Office Phone # Fax #    Donna Baird [phone]       [address] [facility] [address]  [facility] [address]        Equal Access to Services     [facility]: Hadii aad ku hadasho Soomaali, waaxda luqadaha, qaybta kaalmada adeegyada, elmer orourke . So [facility] [phone].    ATENCIÓN: Si habla español, tiene a escobar disposición servicios gratuitos de asistencia lingüística. [facility] [phone].    We comply with applicable federal civil rights laws and [place] laws. We do not discriminate on the basis of race, color, national origin, age, disability, sex, sexual orientation, or gender identity.            Thank you!     Thank you for choosing [facility]  for your care. Our goal is always to provide you with excellent care. Hearing back from our patients is one way we can continue to improve our services. Please take a few minutes to complete the written survey that you may receive in the mail after your visit with us. Thank you!             Your Updated Medication List - Protect others around you: Learn how to safely use, store and throw away your medicines at www.disposemymeds.org.          This list is accurate as of 10/25/18  3:03 PM.  Always use your most recent med list.                   Brand Name Dispense Instructions for use Diagnosis    aspirin 81 MG tablet     30 tablet    Take by mouth daily    S/P kidney transplant       azaTHIOprine 50 MG tablet    IMURAN    90 tablet    Take 1 tablet (50 mg) by mouth daily    Kidney transplant recipient, Long-term use of immunosuppressant medication       * cycloSPORINE modified 100 MG capsule    GENERIC EQUIVALENT    90  capsule    Take 1 capsule (100 mg) by mouth daily    Kidney transplant recipient, Long-term use of immunosuppressant medication       * cycloSPORINE modified 100 MG capsule     90 capsule    Take 1 capsule (100 mg) by mouth daily    Kidney replaced by transplant       GLUCOSAMINE SULFATE PO      Take 1,000 mg by mouth 2 times daily Takes 1000mg in the morning and 1000mg in the evening        KEFLEX PO      Take 1 capsule by mouth 3 times daily        saccharomyces boulardii 250 MG capsule    FLORASTOR     Take 250 mg by mouth daily        vitamin B complex with vitamin C Tabs tablet      Take 1 tablet by mouth daily        VITAMIN D3 PO      Take 2,000 Units by mouth daily        * Notice:  This list has 2 medication(s) that are the same as other medications prescribed for you. Read the directions carefully, and ask your doctor or other care provider to review them with you.

## 2018-10-25 NOTE — NURSING NOTE
"Vital signs:  Temp: 97.9  F (36.6  C) Temp src: Oral BP: 120/76 Pulse: 133   Resp: 14 SpO2: 96 %     Height: 5' 5\" (165.1 cm) Weight: 146 lb 6.4 oz (66.4 kg)  Estimated body mass index is 24.36 kg/(m^2) as calculated from the following:    Height as of this encounter: 5' 5\" (1.651 m).    Weight as of this encounter: 146 lb 6.4 oz (66.4 kg).        "

## 2018-10-25 NOTE — PROGRESS NOTES
Patient had appointment with his/her primary care physician. [facility] services were requested / offered. No immediate safety/risk issues were reported or identified.  Explained the role of the [facility] and provided informational handout and contact information for the [facility]. Pt declined at this time.     Sneha Amador, Behavioral Health Clinician

## 2018-10-25 NOTE — PROGRESS NOTES
SUBJECTIVE:   Cande Baird is a 83 year old female who presents for Preventive Visit.    Are you in the first 12 months of your Medicare Part B coverage?  No  Healthy Habits:    Do you get at least three servings of calcium containing foods daily (dairy, green leafy vegetables, etc.)? yes    Amount of exercise or daily activities, outside of work:     Problems taking medications regularly No    Medication side effects: No    Have you had an eye exam in the past two years? yes    Do you see a dentist twice per year? 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 Licensed by the author for use in [facility]; reprinted with permission (soob@[facility]). All rights reserved.      Reviewed and updated as needed this visit by clinical staff  Tobacco  Allergies  Meds  Med Hx  Surg Hx  Fam Hx  Soc Hx        Reviewed and updated as needed this visit by Provider        Social History   Substance Use Topics     Smoking status: Former Smoker     Types: Cigarettes     Quit date: 5/25/1988     Smokeless tobacco: Never Used     Alcohol use 0.0 oz/week     0 Standard drinks or equivalent per week       If you drink alcohol do you typically have >3 drinks per day or >7 drinks per week? No                        Today's PHQ-2 Score:   PHQ-2 ( 1999 Pfizer) 10/25/2018  8/15/2017   Q1: Little interest or pleasure in doing things 0 0   Q2: Feeling down, depressed or hopeless 1 0   PHQ-2 Score 1 0       Do you feel safe in your environment - Yes    Do you have a Health Care Directive?: Yes: Advance Directive has been received and scanned.    Current providers sharing in care for this patient include:   Patient Care Team:  Donna Baird MD as PCP - General (Internal Medicine)    The following health maintenance items are reviewed in Epic and correct as of today:  Health Maintenance   Topic Date Due     FALL RISK ASSESSMENT  10/25/2019     PHQ-2 Q1 YR  10/25/2019     ADVANCE DIRECTIVE PLANNING Q5 YRS  07/23/2022     TETANUS IMMUNIZATION (SYSTEM ASSIGNED)  12/19/2027     DEXA SCAN SCREENING (SYSTEM ASSIGNED)  Completed     PNEUMOCOCCAL  Completed     INFLUENZA VACCINE  Completed     Patient Active Problem List   Diagnosis     Long-term use of immunosuppressant medication     Immunosuppressed status (H)     S/P kidney transplant     Arthritis     Secondary hyperparathyroidism of renal origin (H)      Advance Care Planning     Past Surgical History:   Procedure Laterality Date     COLONOSCOPY  5/15/2013     LAPAROSCOPIC CYSTECTOMY OVARIAN (BENIGN)       TRANSPLANT  October 17, 1987    Kidney       Social History   Substance Use Topics     Smoking status: Former Smoker     Types: Cigarettes     Quit date: 5/25/1988     Smokeless tobacco: Never Used     Alcohol use 0.0 oz/week     0 Standard drinks or equivalent per week     Family History   Problem Relation Age of Onset     Diabetes Mother 60     Peptic Ulcer Disease Father      Diabetes Sister      Rheumatoid Arthritis Sister      Diabetes Sister            Pneumonia Vaccine:Adults age 65+ who received Pneumovax (PPSV23) at 65 years or older: Should be given PCV13 > 1 year after their most recent PPSV23  Mammogram Screening: Patient over age 75, has elected to stop mammography screening.    ROS:  Constitutional, HEENT,  "cardiovascular, pulmonary, gi and gu systems are negative, except as otherwise noted.    OBJECTIVE:   /76 (BP Location: Right arm, Patient Position: Sitting, Cuff Size: Adult Regular)  Pulse 133  Temp 97.9  F (36.6  C) (Oral)  Resp 14  Ht 1.651 m (5' 5\")  Wt 66.4 kg (146 lb 6.4 oz)  SpO2 96%  BMI 24.36 kg/m2 Estimated body mass index is 24.36 kg/(m^2) as calculated from the following:    Height as of this encounter: 1.651 m (5' 5\").    Weight as of this encounter: 66.4 kg (146 lb 6.4 oz).  EXAM:   GENERAL: healthy, alert and no distress  NECK: no adenopathy, no asymmetry, masses, or scars and thyroid normal to palpation  RESP: lungs clear to auscultation - no rales, rhonchi or wheezes  CV: regular rate and rhythm, normal S1 S2, no S3 or S4, no murmur, click or rub, no peripheral edema and peripheral pulses strong  ABDOMEN: soft, nontender, no hepatosplenomegaly, no masses and bowel sounds normal  MS: no gross musculoskeletal defects noted, no edema  NEURO: Normal strength and tone, mentation intact and speech normal    Diagnostic Test Results:  none     ASSESSMENT / PLAN:       1. Encounter for routine adult health examination without abnormal findings  See below. Recent labs done through [facility] in 8/2018.     2. Secondary hyperparathyroidism of renal origin (H)   Following with Dr. Díaz, in nephrology s/p renal transplant 30 years ago. Baseline Scr at 0.9  On cyclosporine and Imuran    3. 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 Ayers, going for Mohs surgery    End of Life Planning:  Patient currently has an advanced directive: Yes.  Practitioner is supportive of decision.    COUNSELING:  Reviewed preventive health counseling, as reflected in patient instructions       Regular exercise       Healthy diet/nutrition    BP Readings from Last 1 Encounters:   10/25/18 120/76     Estimated body mass index is 24.36 kg/(m^2) as calculated from the following:    Height as of this " "encounter: 1.651 m (5' 5\").    Weight as of this encounter: 66.4 kg (146 lb 6.4 oz).           reports that she quit smoking about 30 years ago. Her smoking use included Cigarettes. She has never used smokeless tobacco.      Appropriate preventive services were discussed with this patient, including applicable screening as appropriate for cardiovascular disease, diabetes, osteopenia/osteoporosis, and glaucoma.  As appropriate for age/gender, discussed screening for colorectal cancer, prostate cancer, breast cancer, and cervical cancer. Checklist reviewing preventive services available has been given to the patient.    Reviewed patients plan of care and provided an AVS. The Basic Care Plan (routine screening as documented in Health Maintenance) for Cande meets the Care Plan requirement. This Care Plan has been established and reviewed with the Patient.    Counseling Resources:  ATP IV Guidelines  Pooled Cohorts Equation Calculator  Breast Cancer Risk Calculator  FRAX Risk Assessment  ICSI Preventive Guidelines  Dietary Guidelines for Americans, 2010  USDA's MyPlate  ASA Prophylaxis  Lung CA Screening    Fely Barger MD  [facility]  "

## 2018-10-25 NOTE — PROGRESS NOTES
SUBJECTIVE:   Cande Baird is a 83 year old female who presents for Preventive Visit.    Are you in the first 12 months of your Medicare coverage?  No    HPI  {Hearing Test Done (Optional):438548}  {Add if <65 person on Medicare  - Required Questions (Optional):428053}  Fall risk:  Fallen 2 or more times in the past year?: No  Any fall with injury in the past year?: No    COGNITIVE SCREEN  1) Repeat 3 items (Leader, Season, Table)    2) Clock draw: NORMAL  3) 3 item recall: Recalls 3 objects  Results: 3 items recalled: COGNITIVE IMPAIRMENT LESS LIKELY    Mini-CogTM Copyright S Shravan. Licensed by the author for use in [facility]; reprinted with permission (mary@[facility]). All rights reserved.        Reviewed and updated as needed this visit by clinical staff  Tobacco  Allergies  Meds  Med Hx  Surg Hx  Fam Hx  Soc Hx        Reviewed and updated as needed this visit by Provider        Social History   Substance Use Topics     Smoking status: Former Smoker     Types: Cigarettes     Quit date: 5/25/1988     Smokeless tobacco: Never Used     Alcohol use 0.0 oz/week     0 Standard drinks or equivalent per week       A glass of wine 2-3 times a week.      Skin check.    Today's PHQ-2 Score:   PHQ-2 ( 1999 Pfizer) 10/25/2018   Q1: Little interest or pleasure in doing things 0   Q2: Feeling down, depressed or hopeless 1   PHQ-2 Score 1       Do you feel safe in your environment - Yes    Do you have a Health Care Directive?: Yes: Advance Directive has been received and scanned.    Current providers sharing in care for this patient include:   Patient Care Team:  Donna Baird MD as PCP - General (Internal Medicine)    The following health maintenance items are reviewed in Epic and correct as of today:  Health Maintenance   Topic Date Due     FALL RISK ASSESSMENT  08/15/2018     PHQ-2 Q1 YR  08/15/2018     INFLUENZA VACCINE (1) 09/01/2018     ADVANCE DIRECTIVE PLANNING Q5 YRS  07/23/2022     TETANUS  "IMMUNIZATION (SYSTEM ASSIGNED)  2027     DEXA SCAN SCREENING (SYSTEM ASSIGNED)  Completed     PNEUMOCOCCAL  Completed     {Chronicprobdata (Optional):637641}    {Decision Support (Optional):650431}    Review of Systems  {ROS COMP (Optional):654457}    OBJECTIVE:   /76 (BP Location: Right arm, Patient Position: Sitting, Cuff Size: Adult Regular)  Pulse 133  Temp 97.9  F (36.6  C) (Oral)  Resp 14  Ht 5' 5\" (1.651 m)  Wt 146 lb 6.4 oz (66.4 kg)  SpO2 96%  BMI 24.36 kg/m2 Estimated body mass index is 24.36 kg/(m^2) as calculated from the following:    Height as of this encounter: 5' 5\" (1.651 m).    Weight as of this encounter: 146 lb 6.4 oz (66.4 kg).  Physical Exam  {Exam (Optional) :665658}    {Diagnostic Test Results (Optional):394715::\"Diagnostic Test Results:\",\"none \"}    ASSESSMENT / PLAN:   {Dia Picklist:684501}    End of Life Planning:  Patient currently has an advanced directive: { :597644}    COUNSELING:  {Medicare Counselin}    BP Readings from Last 1 Encounters:   10/25/18 120/76     Estimated body mass index is 24.36 kg/(m^2) as calculated from the following:    Height as of this encounter: 5' 5\" (1.651 m).    Weight as of this encounter: 146 lb 6.4 oz (66.4 kg).    {BP Counseling- Complete if BP >= 120/80  (Optional):803813}  {Weight Management Plan (ACO) Complete if BMI is abnormal-  Ages 18-64  BMI >24.9.  Age 65+ with BMI <23 or >30 (Optional):465283}     reports that she quit smoking about 30 years ago. Her smoking use included Cigarettes. She has never used smokeless tobacco.  {Tobacco Cessation -- Complete if patient is a smoker (Optional):310966}    Appropriate preventive services were discussed with this patient, including applicable screening as appropriate for cardiovascular disease, diabetes, osteopenia/osteoporosis, and glaucoma.  As appropriate for age/gender, discussed screening for colorectal cancer, prostate cancer, breast cancer, and cervical cancer. Checklist " reviewing preventive services available has been given to the patient.    Reviewed patients plan of care and provided an AVS. The {CarePlan:924804} for Cande meets the Care Plan requirement. This Care Plan has been established and reviewed with the {PATIENT, FAMILY MEMBER, CAREGIVER:191975}.    Counseling Resources:  ATP IV Guidelines  Pooled Cohorts Equation Calculator  Breast Cancer Risk Calculator  FRAX Risk Assessment  ICSI Preventive Guidelines  Dietary Guidelines for Americans, 2010  USDA's MyPlate  ASA Prophylaxis  Lung CA Screening    Fely Barger MD  [facility]

## 2018-10-25 NOTE — MR AVS SNAPSHOT
After Visit Summary   10/25/2018    Cande Baird    MRN: [id number]           Patient Information     Date Of Birth          1935        Visit Information        Provider Department      10/25/2018 2:00 PM Fely Barger MD [facility]        Today's Diagnoses     Encounter for routine adult health examination without abnormal findings    -  1    Secondary hyperparathyroidism of renal origin (H)         Squamous cell carcinoma of skin of chest          Care Instructions      Preventive Health Recommendations    Female Ages 65 +    Yearly exam:     See your health care provider every year in order to  o Review health changes.   o Discuss preventive care.    o Review your medicines if your doctor has prescribed any.      You no longer need a yearly Pap test unless you've had an abnormal Pap test in the past 10 years. If you have vaginal symptoms, such as bleeding or discharge, be sure to talk with your provider about a Pap test.      Every 1 to 2 years, have a mammogram.  If you are over 69, talk with your health care provider about whether or not you want to continue having screening mammograms.      Every 10 years, have a colonoscopy. Or, have a yearly FIT test (stool test). These exams will check for colon cancer.       Have a cholesterol test every 5 years, or more often if your doctor advises it.       Have a diabetes test (fasting glucose) every three years. If you are at risk for diabetes, you should have this test more often.       At age 65, have a bone density scan (DEXA) to check for osteoporosis (brittle bone disease).    Shots:    Get a flu shot each year.    Get a tetanus shot every 10 years.    Talk to your doctor about your pneumonia vaccines. There are now two you should receive - Pneumovax (PPSV 23) and Prevnar (PCV 13).    Talk to your pharmacist about the shingles vaccine.    Talk to your doctor about the hepatitis B vaccine.    Nutrition:     Eat at least 5  servings of fruits and vegetables each day.      Eat whole-grain bread, whole-wheat pasta and brown rice instead of white grains and rice.      Get adequate Calcium and Vitamin D.     Lifestyle    Exercise at least 150 minutes a week (30 minutes a day, 5 days a week). This will help you control your weight and prevent disease.      Limit alcohol to one drink per day.      No smoking.       Wear sunscreen to prevent skin cancer.       See your dentist twice a year for an exam and cleaning.      See your eye doctor every 1 to 2 years to screen for conditions such as glaucoma, macular degeneration and cataracts.    Preventive Health Recommendations    Female Ages 65 +    Yearly exam:     See your health care provider every year in order to  o Review health changes.   o Discuss preventive care.    o Review your medicines if your doctor has prescribed any.      You no longer need a yearly Pap test unless you've had an abnormal Pap test in the past 10 years. If you have vaginal symptoms, such as bleeding or discharge, be sure to talk with your provider about a Pap test.      Every 1 to 2 years, have a mammogram.  If you are over 69, talk with your health care provider about whether or not you want to continue having screening mammograms.      Every 10 years, have a colonoscopy. Or, have a yearly FIT test (stool test). These exams will check for colon cancer.       Have a cholesterol test every 5 years, or more often if your doctor advises it.       Have a diabetes test (fasting glucose) every three years. If you are at risk for diabetes, you should have this test more often.       At age 65, have a bone density scan (DEXA) to check for osteoporosis (brittle bone disease).    Shots:    Get a flu shot each year.    Get a tetanus shot every 10 years.    Talk to your doctor about your pneumonia vaccines. There are now two you should receive - Pneumovax (PPSV 23) and Prevnar (PCV 13).    Talk to your pharmacist about the  shingles vaccine.    Talk to your doctor about the hepatitis B vaccine.    Nutrition:     Eat at least 5 servings of fruits and vegetables each day.      Eat whole-grain bread, whole-wheat pasta and brown rice instead of white grains and rice.      Get adequate Calcium and Vitamin D.     Lifestyle    Exercise at least 150 minutes a week (30 minutes a day, 5 days a week). This will help you control your weight and prevent disease.      Limit alcohol to one drink per day.      No smoking.       Wear sunscreen to prevent skin cancer.       See your dentist twice a year for an exam and cleaning.      See your eye doctor every 1 to 2 years to screen for conditions such as glaucoma, macular degeneration and cataracts.          Follow-ups after your visit        Who to contact     If you have questions or need follow up information about today's clinic visit or your schedule please contact [facility] directly at [phone].  Normal or non-critical lab and imaging results will be communicated to you by MyChart, letter or phone within 4 business days after the clinic has received the results. If you do not hear from us within 7 days, please contact the clinic through [facility] or phone. If you have a critical or abnormal lab result, we will notify you by phone as soon as possible.  Submit refill requests through [employer] or call your pharmacy and they will forward the refill request to us. Please allow 3 business days for your refill to be completed.          Additional Information About Your Visit        MyChart Information     [employer] gives you secure access to your electronic health record. If you see a primary care provider, you can also send messages to your care team and make appointments. If you have questions, please call your primary care clinic.  If you do not have a primary care provider, please call [phone] and they will assist you.        Care EveryWhere ID     This is your Care EveryWhere  "ID. This could be used by other organizations to access your [facility] medical records  [id number]        Your Vitals Were     Pulse Temperature Respirations Height Pulse Oximetry BMI (Body Mass Index)    133 97.9  F (36.6  C) (Oral) 14 5' 5\" (1.651 m) 96% 24.36 kg/m2       Blood Pressure from Last 3 Encounters:   10/25/18 120/76   03/14/18 104/76   12/19/17 120/76    Weight from Last 3 Encounters:   10/25/18 146 lb 6.4 oz (66.4 kg)   03/14/18 150 lb 8 oz (68.3 kg)   12/19/17 148 lb (67.1 kg)              Today, you had the following     No orders found for display       Primary Care Provider Office Phone # Fax #    Donna Baird [phone]       [address] [facility] [address]  [facility] [address]        Equal Access to Services     LYNDA AHN : Hadii aad ku hadasho Soomaali, waaxda luqadaha, qaybta kaalmada adeegyada, waxay nilain haymauricio orourke . So [facility] [phone].    ATENCIÓN: Si habla español, tiene a escobar disposición servicios gratuitos de asistencia lingüística. Llame al [phone].    We comply with applicable federal civil rights laws and [place] laws. We do not discriminate on the basis of race, color, national origin, age, disability, sex, sexual orientation, or gender identity.            Thank you!     Thank you for choosing [facility]  for your care. Our goal is always to provide you with excellent care. Hearing back from our patients is one way we can continue to improve our services. Please take a few minutes to complete the written survey that you may receive in the mail after your visit with us. Thank you!             Your Updated Medication List - Protect others around you: Learn how to safely use, store and throw away your medicines at www.disposemymeds.org.          This list is accurate as of 10/25/18  2:48 PM.  Always use your most recent med list.                   Brand Name Dispense Instructions for use Diagnosis    aspirin 81 MG " tablet     30 tablet    Take by mouth daily    S/P kidney transplant       azaTHIOprine 50 MG tablet    IMURAN    90 tablet    Take 1 tablet (50 mg) by mouth daily    Kidney transplant recipient, Long-term use of immunosuppressant medication       * cycloSPORINE modified 100 MG capsule    GENERIC EQUIVALENT    90 capsule    Take 1 capsule (100 mg) by mouth daily    Kidney transplant recipient, Long-term use of immunosuppressant medication       * cycloSPORINE modified 100 MG capsule     90 capsule    Take 1 capsule (100 mg) by mouth daily    Kidney replaced by transplant       GLUCOSAMINE SULFATE PO      Take 1,000 mg by mouth 2 times daily Takes 1000mg in the morning and 1000mg in the evening        KEFLEX PO      Take 1 capsule by mouth 3 times daily        saccharomyces boulardii 250 MG capsule    FLORASTOR     Take 250 mg by mouth daily        vitamin B complex with vitamin C Tabs tablet      Take 1 tablet by mouth daily        VITAMIN D3 PO      Take 2,000 Units by mouth daily        * Notice:  This list has 2 medication(s) that are the same as other medications prescribed for you. Read the directions carefully, and ask your doctor or other care provider to review them with you.

## 2018-11-09 ENCOUNTER — TELEPHONE (OUTPATIENT)
Dept: INTERNAL MEDICINE | Facility: CLINIC | Age: 83
End: 2018-11-09

## 2018-11-09 NOTE — TELEPHONE ENCOUNTER
Pt had appt 10/25/18, she said you mentioned a moisturizing cream she could use which was good but cannot remember what it was called.  Do you remember what it was?

## 2018-11-22 DIAGNOSIS — Z94.0 KIDNEY TRANSPLANT RECIPIENT: ICD-10-CM

## 2018-11-22 DIAGNOSIS — Z79.60 LONG-TERM USE OF IMMUNOSUPPRESSANT MEDICATION: ICD-10-CM

## 2018-11-23 RX ORDER — AZATHIOPRINE 50 MG/1
TABLET ORAL
Qty: 90 TABLET | Refills: 0 | OUTPATIENT
Start: 2018-11-23

## 2019-01-07 ENCOUNTER — TELEPHONE (OUTPATIENT)
Dept: INTERNAL MEDICINE | Facility: CLINIC | Age: 84
End: 2019-01-07

## 2019-01-07 DIAGNOSIS — J06.9 UPPER RESPIRATORY TRACT INFECTION, UNSPECIFIED TYPE: Primary | ICD-10-CM

## 2019-01-07 NOTE — TELEPHONE ENCOUNTER
Reason for call:  Patient reporting a symptom    Symptom or request: Cough, gets worse when pt is sleeping.    Duration (how long have symptoms been present): 1wk    Have you been treated for this before? Yes    Additional comments: Pt is on an abx due to a UTI, but pt is wanting something for the cough-Robitussin.    Phone Number patient can be reached at:  Home number on file [phone] (home)    Best Time:  any    Can we leave a detailed message on this number:  YES    Call taken on 1/7/2019 at 9:03 AM by Kalli Rand

## 2019-01-08 RX ORDER — CODEINE PHOSPHATE AND GUAIFENESIN 10; 100 MG/5ML; MG/5ML
1-2 SOLUTION ORAL EVERY 4 HOURS PRN
Qty: 118 ML | Refills: 1 | Status: SHIPPED | OUTPATIENT
Start: 2019-01-08 | End: 2019-02-25

## 2019-01-08 NOTE — TELEPHONE ENCOUNTER
Patient is a kidney transplant patient.  Complains of productive cough of light yellow phlegm, denies fever.  Denies SOB, wheezing, chest pain.  Just feels weak and worn out from coughing.  Is currently requesting something for the cough so she can sleep.      If MD wants to see her for the cough she is coming in with her  1/10/19 as he has an appt that morning.   ANSON Lentz R.N.

## 2019-01-08 NOTE — TELEPHONE ENCOUNTER
Rx (Robitussin AC) was faxed to [facility] in [place]. Called and left a message for patient to call back so could relay Dr. Barger's message.

## 2019-01-08 NOTE — TELEPHONE ENCOUNTER
Have prescribed robitussin AC for cough and to help her sleep    If cough does not get better in a week or if at anytime is accompanied by fevers I would like to get her in for a chest xray    Please inform patient, thank you!

## 2019-01-08 NOTE — TELEPHONE ENCOUNTER
Pt returned call and I told her to get Robitussin AC from [facility] and relayed Dr Barger's message that if cough doesn't get better in a week or accompanied with fever she should get in for  a chest xray..  Can return call to pt at [phone] and leave a detailed message if needed.     Crystal Charlton  Pt Service Rep.

## 2019-01-10 ENCOUNTER — ANCILLARY PROCEDURE (OUTPATIENT)
Dept: GENERAL RADIOLOGY | Facility: CLINIC | Age: 84
End: 2019-01-10
Payer: COMMERCIAL

## 2019-01-10 DIAGNOSIS — J06.9 UPPER RESPIRATORY TRACT INFECTION, UNSPECIFIED TYPE: ICD-10-CM

## 2019-01-10 PROCEDURE — 71046 X-RAY EXAM CHEST 2 VIEWS: CPT

## 2019-01-14 RX ORDER — AZITHROMYCIN 250 MG/1
TABLET, FILM COATED ORAL
Qty: 6 TABLET | Refills: 0 | Status: SHIPPED | OUTPATIENT
Start: 2019-01-14 | End: 2019-02-25

## 2019-01-14 NOTE — TELEPHONE ENCOUNTER
Pt called stating she is not getting any better but she does not have a fever. Pt says she is out of energy and is congested. Pt is wondering what to do or if she can get something.   Ph# [phone]-ok to lm.

## 2019-02-25 ENCOUNTER — HOSPITAL ENCOUNTER (EMERGENCY)
Facility: CLINIC | Age: 84
Discharge: HOME | End: 2019-02-25
Attending: EMERGENCY MEDICINE | Admitting: EMERGENCY MEDICINE
Payer: COMMERCIAL

## 2019-02-25 VITALS
TEMPERATURE: 100 F | HEART RATE: 120 BPM | OXYGEN SATURATION: 93 % | SYSTOLIC BLOOD PRESSURE: 107 MMHG | DIASTOLIC BLOOD PRESSURE: 48 MMHG | RESPIRATION RATE: 18 BRPM

## 2019-02-25 DIAGNOSIS — R11.2 NON-INTRACTABLE VOMITING WITH NAUSEA, UNSPECIFIED VOMITING TYPE: ICD-10-CM

## 2019-02-25 LAB
ALBUMIN UR-MCNC: NEGATIVE MG/DL
ANION GAP SERPL CALCULATED.3IONS-SCNC: 6 MMOL/L (ref 3–14)
APPEARANCE UR: ABNORMAL
BACTERIA #/AREA URNS HPF: ABNORMAL /HPF
BASOPHILS # BLD AUTO: 0 10E9/L (ref 0–0.2)
BASOPHILS NFR BLD AUTO: 0.1 %
BILIRUB UR QL STRIP: NEGATIVE
BUN SERPL-MCNC: 19 MG/DL (ref 7–30)
CALCIUM SERPL-MCNC: 8.7 MG/DL (ref 8.5–10.1)
CHLORIDE SERPL-SCNC: 104 MMOL/L (ref 94–109)
CO2 SERPL-SCNC: 26 MMOL/L (ref 20–32)
COLOR UR AUTO: YELLOW
CREAT SERPL-MCNC: 0.86 MG/DL (ref 0.52–1.04)
DIFFERENTIAL METHOD BLD: ABNORMAL
EOSINOPHIL # BLD AUTO: 0 10E9/L (ref 0–0.7)
EOSINOPHIL NFR BLD AUTO: 0 %
ERYTHROCYTE [DISTWIDTH] IN BLOOD BY AUTOMATED COUNT: 14 % (ref 10–15)
GFR SERPL CREATININE-BSD FRML MDRD: 63 ML/MIN/{1.73_M2}
GLUCOSE SERPL-MCNC: 129 MG/DL (ref 70–99)
GLUCOSE UR STRIP-MCNC: NEGATIVE MG/DL
HCT VFR BLD AUTO: 40.4 % (ref 35–47)
HGB BLD-MCNC: 12.9 G/DL (ref 11.7–15.7)
HGB UR QL STRIP: ABNORMAL
IMM GRANULOCYTES # BLD: 0.1 10E9/L (ref 0–0.4)
IMM GRANULOCYTES NFR BLD: 0.4 %
KETONES UR STRIP-MCNC: NEGATIVE MG/DL
LEUKOCYTE ESTERASE UR QL STRIP: ABNORMAL
LYMPHOCYTES # BLD AUTO: 0.1 10E9/L (ref 0.8–5.3)
LYMPHOCYTES NFR BLD AUTO: 0.3 %
MCH RBC QN AUTO: 30.9 PG (ref 26.5–33)
MCHC RBC AUTO-ENTMCNC: 31.9 G/DL (ref 31.5–36.5)
MCV RBC AUTO: 97 FL (ref 78–100)
MONOCYTES # BLD AUTO: 0.7 10E9/L (ref 0–1.3)
MONOCYTES NFR BLD AUTO: 4 %
MUCOUS THREADS #/AREA URNS LPF: PRESENT /LPF
NEUTROPHILS # BLD AUTO: 16.3 10E9/L (ref 1.6–8.3)
NEUTROPHILS NFR BLD AUTO: 95.2 %
NITRATE UR QL: POSITIVE
NRBC # BLD AUTO: 0 10*3/UL
NRBC BLD AUTO-RTO: 0 /100
PH UR STRIP: 5 PH (ref 5–7)
PLATELET # BLD AUTO: 254 10E9/L (ref 150–450)
POTASSIUM SERPL-SCNC: 4.5 MMOL/L (ref 3.4–5.3)
RBC # BLD AUTO: 4.17 10E12/L (ref 3.8–5.2)
RBC #/AREA URNS AUTO: 2 /HPF (ref 0–2)
SODIUM SERPL-SCNC: 136 MMOL/L (ref 133–144)
SOURCE: ABNORMAL
SP GR UR STRIP: 1.01 (ref 1–1.03)
SQUAMOUS #/AREA URNS AUTO: 1 /HPF (ref 0–1)
UROBILINOGEN UR STRIP-MCNC: 0 MG/DL (ref 0–2)
WBC # BLD AUTO: 17.1 10E9/L (ref 4–11)
WBC #/AREA URNS AUTO: 26 /HPF (ref 0–5)

## 2019-02-25 PROCEDURE — 87186 SC STD MICRODIL/AGAR DIL: CPT | Performed by: EMERGENCY MEDICINE

## 2019-02-25 PROCEDURE — 25000132 ZZH RX MED GY IP 250 OP 250 PS 637: Performed by: EMERGENCY MEDICINE

## 2019-02-25 PROCEDURE — 81001 URINALYSIS AUTO W/SCOPE: CPT | Performed by: EMERGENCY MEDICINE

## 2019-02-25 PROCEDURE — 87088 URINE BACTERIA CULTURE: CPT | Performed by: EMERGENCY MEDICINE

## 2019-02-25 PROCEDURE — 85025 COMPLETE CBC W/AUTO DIFF WBC: CPT | Performed by: EMERGENCY MEDICINE

## 2019-02-25 PROCEDURE — 25000128 H RX IP 250 OP 636: Performed by: EMERGENCY MEDICINE

## 2019-02-25 PROCEDURE — 96361 HYDRATE IV INFUSION ADD-ON: CPT

## 2019-02-25 PROCEDURE — 99283 EMERGENCY DEPT VISIT LOW MDM: CPT | Mod: 25

## 2019-02-25 PROCEDURE — 80048 BASIC METABOLIC PNL TOTAL CA: CPT | Performed by: EMERGENCY MEDICINE

## 2019-02-25 PROCEDURE — 96360 HYDRATION IV INFUSION INIT: CPT

## 2019-02-25 PROCEDURE — 87086 URINE CULTURE/COLONY COUNT: CPT | Performed by: EMERGENCY MEDICINE

## 2019-02-25 RX ORDER — ONDANSETRON 2 MG/ML
4 INJECTION INTRAMUSCULAR; INTRAVENOUS
Status: DISCONTINUED | OUTPATIENT
Start: 2019-02-25 | End: 2019-02-25 | Stop reason: HOSPADM

## 2019-02-25 RX ORDER — CEPHALEXIN 500 MG/1
500 CAPSULE ORAL ONCE
Status: COMPLETED | OUTPATIENT
Start: 2019-02-25 | End: 2019-02-25

## 2019-02-25 RX ORDER — CEPHALEXIN 500 MG/1
500 CAPSULE ORAL 2 TIMES DAILY
Qty: 28 CAPSULE | Refills: 0 | Status: SHIPPED | OUTPATIENT
Start: 2019-02-25 | End: 2019-06-12

## 2019-02-25 RX ORDER — ONDANSETRON 4 MG/1
4 TABLET, ORALLY DISINTEGRATING ORAL EVERY 6 HOURS PRN
Qty: 15 TABLET | Refills: 0 | Status: SHIPPED | OUTPATIENT
Start: 2019-02-25 | End: 2020-01-14

## 2019-02-25 RX ADMIN — CEPHALEXIN 500 MG: 500 CAPSULE ORAL at 12:22

## 2019-02-25 RX ADMIN — SODIUM CHLORIDE 1000 ML: 9 INJECTION, SOLUTION INTRAVENOUS at 09:33

## 2019-02-25 ASSESSMENT — ENCOUNTER SYMPTOMS
NAUSEA: 1
VOMITING: 1
FEVER: 0
ABDOMINAL PAIN: 0
DYSURIA: 1
WEAKNESS: 1
CHILLS: 1
DIARRHEA: 0
SHORTNESS OF BREATH: 0

## 2019-02-25 NOTE — ED NOTES
Bed: ED11  Expected date: 2/25/19  Expected time: 8:34 AM  Means of arrival: Ambulance  Comments:  [id number]

## 2019-02-25 NOTE — RESULT ENCOUNTER NOTE
Emergency Dept/Urgent Care discharge antibiotic (if prescribed): Cephalexin (Keflex) 500 mg capsule, 1 capsule (500 mg) by mouth 2 times daily for 7 days.  Date of Rx (if applicable):  2/25/19  No changes in treatment per Urine culture protocol.

## 2019-02-25 NOTE — ED TRIAGE NOTES
"Pt presents with c/o nausea and vomiting since about 2 AM. States she threw up about probably \"about 10 times.\" Zofran given by EMS and nausea is improved. Emesis yellow in color. Denies diarrhea. Denies pain. Feels weak. Does report some burning with urination.  "

## 2019-02-25 NOTE — DISCHARGE INSTRUCTIONS
Discharge Instructions  Urinary Tract Infection  You or your child have been diagnosed with a urinary tract infection, or UTI. The urinary tract includes the kidneys (which make urine/pee), ureters (the tubes that carry urine/pee from the kidneys to the bladder), the bladder (which stores urine/pee), and urethra (the tube that carries urine/pee out of the bladder). Urinary tract infections occur when bacteria travel up the urethra into the bladder (bladder infection) and, in some cases, from there into the kidneys (kidney infection).  Generally, every Emergency Department visit should have a follow-up clinic visit with either a primary or a specialty clinic/provider. Please follow-up as instructed by your emergency provider today.  Return to the Emergency Department if:  You or your child have severe back pain.  You or your child are vomiting (throwing up) so that you cannot take your medicine.  You or your child have a new fever (had not previously had a fever) over 101 F.  You or your child have confusion or are very weak, or feel very ill.  Your child seems much more ill, will not wake up, will not respond right, or is crying for a long time and will not calm down.  You or your child are showing signs of dehydration. These signs may include decreased urination (pee), dry mouth/gums/tongue, or decreased activity.    Follow-up with your provider:   Children under 24 months need to be seen by their regular provider within one week after a diagnosis of a UTI. It may be necessary to do some more tests to look at the child?s kidney or bladder.  You should begin to feel better within 24 - 48 hours of starting your antibiotic; follow-up with your regular clinic/doctor/provider if this is not the case.    Treatment:   You will be treated with an antibiotic to kill the bacteria. We have to make an educated guess, based on what we know about common bacteria and antibiotics, as to which antibiotic will work for your  "infection. We will be correct most times but there will be some cases where the antibiotic chosen is not correct (see urine cultures below).  Take a pain medication such as acetaminophen (Tylenol ) or ibuprofen (Advil , Motrin , Nuprin ).  Phenazopyridine (Pyridium , Uristat ) is a prescription medication that numbs the bladder to reduce the burning pain of some UTIs.  The same medication is available in a non-prescription version (Azo-Standard , Urodol ). This medication will change the color of the urine and tears (usually blue or orange). If you wear contacts, do not wear them while taking this medication as they may be stained by the medication.    Urine Cultures:  If indicated, a urine culture may have been performed today. This test generally takes 24-48 hours to complete so the results are not known at this time. The results can confirm that an infection is present but also determine which antibiotic is effective for the specific bacteria that is causing the infection. If your urine culture shows that the antibiotic you were given today will not work to treat your infection, we will attempt to contact you to make arrangements to change the antibiotic. If the culture confirms that the antibiotic is effective for your infection, you will not be contacted. We often recommend follow-up with your regular physician/provider on the culture results regardless of this process.    Antibiotic Warning:   If you have been placed on antibiotics - watch for signs of allergic reaction.  These include rash, lip swelling, difficulty breathing, wheezing, and dizziness.  If you develop any of these symptoms, stop the antibiotic immediately and go to an emergency room or urgent care for evaluation.    Probiotics: If you have been given an antibiotic, you may want to also take a probiotic pill or eat yogurt with live cultures. Probiotics have \"good bacteria\" to help your intestines stay healthy. Studies have shown that probiotics " help prevent diarrhea and other intestine problems (including C. diff infection) when you take antibiotics. You can buy these without a prescription in the pharmacy section of the store.   If you were given a prescription for medicine here today, be sure to read all of the information (including the package insert) that comes with your prescription.  This will include important information about the medicine, its side effects, and any warnings that you need to know about.  The pharmacist who fills the prescription can provide more information and answer questions you may have about the medicine.  If you have questions or concerns that the pharmacist cannot address, please call or return to the Emergency Department.   Remember that you can always come back to the Emergency Department if you are not able to see your regular provider in the amount of time listed above, if you get any new symptoms, or if there is anything that worries you.

## 2019-02-25 NOTE — ED PROVIDER NOTES
History     Chief Complaint:  Nausea & Vomiting    HPI   Cande Baird is a 83 year old female, with a history of glomerulonephritis and s/p kidney transplant, who presents with her daughter to the ED for evaluation of nausea and vomiting. The patient reports she had chicken with packaged sweet/sour sauce last night. She developed nausea, vomiting, and chills at 2:00AM.  She vomited x8 last night and twice this morning. She is unable to tolerate even water. She also has been having dysuria. The patient notes her nausea has improved after 4mg Zofran provided by EMS. She currently feels dry and weak. The patient denies any fever, shortness of breath, chest pain, diarrhea, or abdominal pain.      Allergies:  No known drug allergies    Medications:    Gengraf  Azathioprine  Aspirin 81mg  Vitamin D3  Cyclosporine  Glucosamine  Florastor  Vitamin B    Past Medical History:    Arthritis  Glomerulonephritis   Skin cancer  Anxiety     Past Surgical History:    Kidney transplant   Ovarian cystectomy     Family History:    Diabetes  PUD  RA    Social History:  Smoking status: Former smoker, Quit 1988  Alcohol use: Yes  Presents to ED with daughter    Marital Status:   [2]     Review of Systems   Constitutional: Positive for chills. Negative for fever.   Respiratory: Negative for shortness of breath.    Cardiovascular: Negative for chest pain.   Gastrointestinal: Positive for nausea and vomiting. Negative for abdominal pain and diarrhea.   Genitourinary: Positive for dysuria.   Neurological: Positive for weakness.   All other systems reviewed and are negative.      Physical Exam     Patient Vitals for the past 24 hrs:   BP Temp Temp src Pulse Heart Rate Resp SpO2   02/25/19 1100 107/48 -- -- 120 -- -- 93 %   02/25/19 1014 123/57 100  F (37.8  C) Oral -- 113 18 94 %   02/25/19 1000 113/51 -- -- 109 -- -- 96 %   02/25/19 0900 119/76 99.6  F (37.6  C) Oral 96 96 18 94 %     Physical Exam   General: Patient is alert and  interactive when I enter the room  Head:  The scalp, face, and head appear normal  Eyes:  The pupils are equal, round, and reactive to light    Conjunctivae and sclerae are normal  ENT:    External acoustic canals are normal    The oropharynx is normal without erythema.     Uvula is in the midline  Neck:  Normal range of motion  CV:  Regular rate. S1/S2. No murmurs.   Resp:  Lungs are clear without wheezes or rales. No distress  GI:  Abdomen is soft, no rigidity, guarding, or rebound    No distension. No tenderness to palpation in any quadrant.     No tenderness to transplanted kidney.   MS:  Normal tone. Joints grossly normal without effusions.     No asymmetric leg swelling, calf or thigh tenderness.      Normal motor assessment of all extremities.  Skin:  No rash or lesions noted. Normal capillary refill noted  Neuro: Speech is normal and fluent. Face is symmetric.     Moving all extremities well.   Psych:  Awake. Alert.  Normal affect.  Appropriate interactions.  Lymph: No anterior cervical lymphadenopathy noted    Emergency Department Course     Laboratory:  CBC: WBC 17.1(H), o/w WNL (HGB 12.9, )  BMP: Glucose 129(H), o/w WNL (Creatinine 0.86)     UA: Blood small, Nitrite positive, Leukocyte esterase moderate, WBC 26(H), Bacteria many, Mucous present, o/w Negative     Urine culture: In process     Interventions:  0933: NS 1L Bolus IV  1222: Keflex 500mg PO    Emergency Department Course:  Patient arrived by EMS.     Past medical records, nursing notes, and vitals reviewed.  0915: I performed an exam of the patient and obtained history, as documented above.    IV inserted and blood drawn.    1153: I rechecked the patient. Explained findings to patient and daughter.    Findings and plan explained to the Patient and daughter. Patient discharged home with instructions regarding supportive care, medications, and reasons to return. The importance of close follow-up was reviewed.     Impression & Plan       Medical Decision Making:  Cande Baird is a 83 year old female who presents for evaluation of vomiting, dysuria.  in hospital with GI illness so GI illness is more likely. Dysuria preceeded vomiting.  This clinically is consistent with a urinary tract infection.  Urinalysis confirms the infection.  There has been low grade fevers but no back/flank pain or significant abdominal pain.  There is no clinical evidence of pyelonephritis, appendicitis, colitis, diverticulitis or any intraabdominal catastrophe. The patient will be started on antibiotics for the infection. Return if increasing pain, vomiting, fever, or inability to tolerate the oral antibiotic.  Follow up with primary physician is indicated if not improving in 2-3 days.       Diagnosis:    ICD-10-CM   1. Non-intractable vomiting with nausea, unspecified vomiting type R11.2     Disposition: .Patient discharged to home with daughter      Discharge Medications:  cephALEXin 500 MG capsule  Commonly known as:  KEFLEX  500 mg, Oral, 2 TIMES DAILY x 7 days     ondansetron 4 MG ODT tab  Commonly known as:  ZOFRAN-ODT  4 mg, Oral, EVERY 6 HOURS PRN       Elisabeth Wellington  2/25/2019   [facility] EMERGENCY DEPARTMENT    Scribe Disclosure:  Elisabeth HERRMANN, am serving as a scribe at 9:15 AM on 2/25/2019 to document services personally performed by Arvin Mane MD based on my observations and the provider's statements to me.        Arvin Mane MD  02/25/19 [id number]

## 2019-02-25 NOTE — ED AVS SNAPSHOT
[facility]  [address]  [facility] [address]  Phone:  [phone]  Fax:  [phone]                                    Cande Baird   MRN: [id number]    Department:  [facility]   Date of Visit:  2/25/2019           After Visit Summary Signature Page    I have received my discharge instructions, and my questions have been answered. I have discussed any challenges I see with this plan with the nurse or doctor.    ..........................................................................................................................................  Patient/Patient Representative Signature      ..........................................................................................................................................  Patient Representative Print Name and Relationship to Patient    ..................................................               ................................................  Date                                   Time    ..........................................................................................................................................  Reviewed by Signature/Title    ...................................................              ..............................................  Date                                               Time          22EPIC Rev 08/18

## 2019-02-26 NOTE — RESULT ENCOUNTER NOTE
Await final culture report per [facility] ED Lab Result protocol.  RN confirmed Patient was prescribed antibiotic from ED visit.

## 2019-02-27 LAB
BACTERIA SPEC CULT: ABNORMAL
Lab: ABNORMAL
SPECIMEN SOURCE: ABNORMAL

## 2019-02-27 NOTE — RESULT ENCOUNTER NOTE
Final Urine Culture Report on 2/27/19  Emergency Dept/Urgent Care discharge antibiotic prescribed: Cephalexin (Keflex) 500 mg capsule, 1 capsule (500 mg) by mouth 2 times daily for 7 days  #1. Bacteria, >100,000 colonies/mL Escherichia coli, is SUSCEPTIBLE to Antibiotic.    As per [place] ED Lab Result protocol, no change in antibiotic therapy.

## 2019-06-10 ENCOUNTER — NURSE TRIAGE (OUTPATIENT)
Dept: INTERNAL MEDICINE | Facility: CLINIC | Age: 84
End: 2019-06-10

## 2019-06-10 NOTE — TELEPHONE ENCOUNTER
Additional Information    Negative: ACUTE NEUROLOGIC SYMPTOM and symptom present now    Negative: Knocked out (unconscious) > 1 minute    Negative: Seizure (convulsion) occurred (Exception: prior history of seizures and now alert and without Acute Neurologic Symptoms)    Negative: Neck pain after dangerous injury (e.g., MVA, diving, trampoline, contact sports, fall > 10 feet or 3 meters) (Exception: neck pain began > 1 hour after injury)    Negative: Major bleeding (actively dripping or spurting) that can't be stopped    Negative: Penetrating head injury (e.g., knife, gunshot wound, metal object)    Negative: Sounds like a life-threatening emergency to the triager    Negative: Recently examined and diagnosed with a concussion by a healthcare provider and has questions about concussion symptoms    Negative: Can't remember what happened (amnesia)    Negative: Vomiting once or more    Negative: Watery or blood-tinged fluid dripping from the nose or ears    Minor head injury    Negative: Patient is confused or is an unreliable provider of information (e.g., dementia, profound mental retardation, alcohol intoxication)    Negative: ACUTE NEUROLOGIC SYMPTOM and now fine    Negative: Knocked out (unconscious) < 1 minute and now fine    Negative: Severe headache    Negative: Dangerous injury (e.g., MVA, diving, trampoline, contact sports, fall > 10 feet or 3 meters) or severe blow from hard object (e.g., golf club or baseball bat)    Negative: Large swelling or bruise > 2 inches (5 cm)    Negative: Skin is split open or gaping (length > 1/2 inch or 12 mm)    Negative: Bleeding won't stop after 10 minutes of direct pressure (using correct technique)    Negative: One or two 'black eyes' (bruising, purple color of eyelids)     Not black or blue on eyelid but red underneath    Negative: Taking Coumadin (warfarin) or other strong blood thinner, or known bleeding disorder (e.g., thrombocytopenia)    Negative: Age over 65 years  "with and area of head swelling or bruise    Negative: Sounds like a serious injury to the triager    Negative: Wound and no tetanus booster in > 5 years (Or greater than 10 years for clean cuts)    Negative: After 3 days and headache persists    Negative: Suspicious history for the injury    Patient wants to be seen     Patient requesting order for CT    Answer Assessment - Initial Assessment Questions  1. MECHANISM: \"How did the injury happen?\" For falls, ask: \"What height did you fall from?\" and \"What surface did you fall against?\"       Patient dropped water off bedside table, leaned over to pick it up and fell into night stand  2. ONSET: \"When did the injury happen?\" (Minutes or hours ago)       2-3 days   3. NEUROLOGIC SYMPTOMS: \"Was there any loss of consciousness?\" \"Are there any other neurological symptoms?\"       No, no neuro symptoms- patient has some blurry vision but not constant or consistent   4. MENTAL STATUS: \"Does the person know who he is, who you are, and where he is?\"       Yes, no confusion noted  5. LOCATION: \"What part of the head was hit?\"      Top of the head and forehead   6. SCALP APPEARANCE: \"What does the scalp look like? Is it bleeding now?\" If so, ask: \"Is it difficult to stop?\"       Top left of head was cut, bleeding stopped a while ago, a home health nurse came out to evaluate her at her assisted living the day after her event and again yesterday and stated that patient was improving  7. SIZE: For cuts, bruises, or swelling, ask: \"How large is it?\" (e.g., inches or centimeters)       Cut- healing, not large   8. PAIN: \"Is there any pain?\" If so, ask: \"How bad is it?\"  (e.g., Scale 1-10; or mild, moderate, severe)      None 0  9. TETANUS: For any breaks in the skin, ask: \"When was the last tetanus booster?\"  12/19/17  10. OTHER SYMPTOMS: \"Do you have any other symptoms?\" (e.g., neck pain, vomiting)        no    Protocols used: HEAD INJURY-A-OH    Next 5 appointments (look out 90 " days)    Jun 12, 2019  2:20 PM CDT  SHORT with Donna Baird MD  [facility] ([facility] [address]  [facility] [id number] [phone]

## 2019-06-12 ENCOUNTER — OFFICE VISIT (OUTPATIENT)
Dept: INTERNAL MEDICINE | Facility: CLINIC | Age: 84
End: 2019-06-12
Payer: COMMERCIAL

## 2019-06-12 ENCOUNTER — HOSPITAL ENCOUNTER (OUTPATIENT)
Dept: CT IMAGING | Facility: CLINIC | Age: 84
Discharge: HOME | End: 2019-06-12
Attending: INTERNAL MEDICINE | Admitting: INTERNAL MEDICINE
Payer: COMMERCIAL

## 2019-06-12 VITALS
HEART RATE: 100 BPM | OXYGEN SATURATION: 97 % | DIASTOLIC BLOOD PRESSURE: 80 MMHG | WEIGHT: 141.3 LBS | HEIGHT: 65 IN | TEMPERATURE: 98.2 F | SYSTOLIC BLOOD PRESSURE: 128 MMHG | BODY MASS INDEX: 23.54 KG/M2 | RESPIRATION RATE: 16 BRPM
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
## 2019-06-12 DIAGNOSIS — S09.90XA TRAUMATIC INJURY OF HEAD, INITIAL ENCOUNTER: ICD-10-CM

## 2019-06-12 PROCEDURE — 70450 CT HEAD/BRAIN W/O DYE: CPT

## 2019-06-12 PROCEDURE — 99214 OFFICE O/P EST MOD 30 MIN: CPT | Performed by: INTERNAL MEDICINE

## 2019-06-12 ASSESSMENT — MIFFLIN-ST. JEOR: SCORE: 1106.81

## 2019-06-12 NOTE — PROGRESS NOTES
"Subjective     Cande Baird is a 83 year old female who presents to clinic today for the following health issues:    HPI     Fell on face at 6:30 AM in bedroom when knees gave out reaching for water bottle on the floor on 6-4-2019.   Her forehead hit the front of the night stand. She did not loose consciousness but her whole face became bruised. She has had ongoing moderate frontal headaches. She is not sure if she has had any visual changes because of the swelling. No hearing problems or ear drainage. Her only blood thinner is a baby ASA every day. She has no neck pain. Denies any focal weakness, numbness or tingling.     She is over 30 years out from a renal transplant and immunosuppressed.         Reviewed and updated as needed this visit by Provider         Review of Systems   ROS COMP: Constitutional, HEENT, cardiovascular, pulmonary, GI, , musculoskeletal, neuro, skin, endocrine and psych systems are negative, except as otherwise noted.      Objective    /80 (BP Location: Left arm, Patient Position: Chair, Cuff Size: Adult Large)   Pulse 100   Temp 98.2  F (36.8  C) (Oral)   Resp 16   Ht 1.651 m (5' 5\")   Wt 64.1 kg (141 lb 4.8 oz)   SpO2 97%   BMI 23.51 kg/m    Body mass index is 23.51 kg/m .  Physical Exam   GENERAL: healthy, alert and no distress  NECK: no adenopathy, no asymmetry, masses, or scars and thyroid normal to palpation  RESP: lungs clear to auscultation - no rales, rhonchi or wheezes  CV: regular rate and rhythm, normal S1 S2, no S3 or S4, no murmur, click or rub, no peripheral edema and peripheral pulses strong  ABDOMEN: soft, nontender, no hepatosplenomegaly, no masses and bowel sounds normal  MS: no gross musculoskeletal defects noted, no edema  SKIN: large bump left forehead, resolving bruising from just above hairline to upper lip  NEURO: Normal strength and tone, sensory exam grossly normal, mentation intact, cranial nerves 2-12 intact and DTR's normal and symmetric    PSYCH: " mentation appears normal, affect normal/bright          Assessment & Plan     1. Fall, initial encounter  Usual Conservative treatment recommended. Will check head CT  - CT Head w/o Contrast; Future    2. Traumatic injury of head, initial encounter   as above  - CT Head w/o Contrast; Future      No follow-ups on file.    Donna Baird MD  [facility]

## 2019-07-31 ENCOUNTER — TELEPHONE (OUTPATIENT)
Dept: TRANSPLANT | Facility: CLINIC | Age: 84
End: 2019-07-31

## 2019-07-31 DIAGNOSIS — Z94.0 KIDNEY TRANSPLANT RECIPIENT: Primary | ICD-10-CM

## 2019-07-31 DIAGNOSIS — Z79.60 LONG-TERM USE OF IMMUNOSUPPRESSANT MEDICATION: ICD-10-CM

## 2019-07-31 NOTE — TELEPHONE ENCOUNTER
Pt states she has always gotten a Progress report yearly on how she is doing  Per pt don't you send that out anymore?

## 2019-07-31 NOTE — TELEPHONE ENCOUNTER
ANNUAL PROGRESS REPORT  1. Past due for annual follow up with transplant nephrologist (last appointment Sept 2016)   - copy of the nephrologist's progress note from annual clinic visit is progress report  2. Past due for kidney transplant labs   - recommend labs every 3 months   - given 31+ years post transplant and 84 year of age, ask nephrologist about every 6 month labs  3. Once daily cyclosporine modified plus once daily azathioprine   - no monitoring of cyclosporine drug levels needed    LPN TASK  1. Call patient with information above.    - needs annual appointment with transplant nephrologist   - recommend Dr. Del Rio or Dr. Zamora as Dr. Mccord retired and is not available  2. Remind her that we cannot prescribe immunosuppression unless    - she is seen once a year   - does labs a minimum of every 6 months   - standing lab orders from kidney transplant in [facility] or [facility]

## 2019-09-06 ENCOUNTER — TELEPHONE (OUTPATIENT)
Dept: INTERNAL MEDICINE | Facility: CLINIC | Age: 84
End: 2019-09-06

## 2019-09-06 DIAGNOSIS — R05.9 COUGH: Primary | ICD-10-CM

## 2019-09-06 RX ORDER — CODEINE PHOSPHATE AND GUAIFENESIN 10; 100 MG/5ML; MG/5ML
1-2 SOLUTION ORAL EVERY 4 HOURS PRN
Qty: 120 ML | Refills: 0 | Status: SHIPPED | OUTPATIENT
Start: 2019-09-06 | End: 2020-01-14

## 2019-09-06 NOTE — TELEPHONE ENCOUNTER
"Call to patient. States she mostly coughs during the night and sometimes during the day too. Describes as a \"tickling\" in the back of her throat. States she is not getting much sleep because of this. She has been taking Robitussin DM and it does not help. Patient also reports runny nose but denies fever or other cold symptoms. States she is a kidney transplant patient and the cough and lack of sleep is wearing her down and she is starting to feel week so she is not able to come in for an appointment. Declines urgent care. Patient states she knows it is not anything more serious than a cough. Has received prescription for Robitussin with codeine for similar symptoms in the past and is requesting this again. Requesting message be sent to one of Dr. Baird's partners.   "

## 2019-09-06 NOTE — TELEPHONE ENCOUNTER
Patient is calling because she has had a dry cough for about the last three days. She has been using OTC cough syrup but it is not helping. She is wondering if she can get a rx. Can another provider address this sine Dr Baird is out of the office.

## 2019-09-25 ENCOUNTER — OFFICE VISIT (OUTPATIENT)
Dept: INTERNAL MEDICINE | Facility: CLINIC | Age: 84
End: 2019-09-25
Payer: COMMERCIAL

## 2019-09-25 VITALS
WEIGHT: 143.4 LBS | OXYGEN SATURATION: 97 % | BODY MASS INDEX: 23.89 KG/M2 | SYSTOLIC BLOOD PRESSURE: 130 MMHG | HEIGHT: 65 IN | TEMPERATURE: 98 F | DIASTOLIC BLOOD PRESSURE: 80 MMHG | HEART RATE: 96 BPM | RESPIRATION RATE: 16 BRPM

## 2019-09-25 DIAGNOSIS — Z23 NEED FOR PROPHYLACTIC VACCINATION AND INOCULATION AGAINST INFLUENZA: ICD-10-CM

## 2019-09-25 DIAGNOSIS — R06.09 DOE (DYSPNEA ON EXERTION): Primary | ICD-10-CM

## 2019-09-25 DIAGNOSIS — R30.0 DYSURIA: ICD-10-CM

## 2019-09-25 LAB
ALBUMIN UR-MCNC: NEGATIVE MG/DL
APPEARANCE UR: CLEAR
BACTERIA #/AREA URNS HPF: ABNORMAL /HPF
BILIRUB UR QL STRIP: NEGATIVE
COLOR UR AUTO: YELLOW
GLUCOSE UR STRIP-MCNC: NEGATIVE MG/DL
HGB UR QL STRIP: NEGATIVE
KETONES UR STRIP-MCNC: NEGATIVE MG/DL
LEUKOCYTE ESTERASE UR QL STRIP: ABNORMAL
NITRATE UR QL: NEGATIVE
NON-SQ EPI CELLS #/AREA URNS LPF: ABNORMAL /LPF
PH UR STRIP: 6.5 PH (ref 5–7)
RBC #/AREA URNS AUTO: ABNORMAL /HPF
SOURCE: ABNORMAL
SP GR UR STRIP: 1.01 (ref 1–1.03)
UROBILINOGEN UR STRIP-ACNC: 0.2 EU/DL (ref 0.2–1)
WBC #/AREA URNS AUTO: ABNORMAL /HPF

## 2019-09-25 PROCEDURE — 99214 OFFICE O/P EST MOD 30 MIN: CPT | Mod: 25 | Performed by: INTERNAL MEDICINE

## 2019-09-25 PROCEDURE — 87086 URINE CULTURE/COLONY COUNT: CPT | Performed by: INTERNAL MEDICINE

## 2019-09-25 PROCEDURE — 90471 IMMUNIZATION ADMIN: CPT | Performed by: INTERNAL MEDICINE

## 2019-09-25 PROCEDURE — 81001 URINALYSIS AUTO W/SCOPE: CPT | Performed by: INTERNAL MEDICINE

## 2019-09-25 PROCEDURE — 93000 ELECTROCARDIOGRAM COMPLETE: CPT | Performed by: INTERNAL MEDICINE

## 2019-09-25 PROCEDURE — 99207 C PAF COMPLETED  NO CHARGE: CPT | Performed by: INTERNAL MEDICINE

## 2019-09-25 PROCEDURE — 90662 IIV NO PRSV INCREASED AG IM: CPT | Performed by: INTERNAL MEDICINE

## 2019-09-25 RX ORDER — CYCLOSPORINE 25 MG/1
75 CAPSULE, LIQUID FILLED ORAL
COMMUNITY
Start: 2019-09-25 | End: 2021-01-01

## 2019-09-25 ASSESSMENT — MIFFLIN-ST. JEOR: SCORE: 1111.34

## 2019-09-25 NOTE — PROGRESS NOTES
"Subjective     Cande Baird is a 84 year old female who presents to clinic today for the following health issues:    HPI     Follow up cough & SOB. Cough worse at night. Urinary burning for 3 days.   Her cough is much better and she is back to sleeping at night.    She is having some dysuria, no hematuria.    Her main concern though is dyspnea on exertion. She says she can walk 20 minutes max which is a definite change from 2 years ago. She feels exhausted when she goes to do things and is often short of breath. She had normal spirometry in 2016. She is on chronic immunosuppression on cyclosporine for renal transplant.       BP Readings from Last 3 Encounters:   09/25/19 130/80   06/12/19 128/80   02/25/19 107/48    Wt Readings from Last 3 Encounters:   09/25/19 65 kg (143 lb 6.4 oz)   06/12/19 64.1 kg (141 lb 4.8 oz)   10/25/18 66.4 kg (146 lb 6.4 oz)                 Reviewed and updated as needed this visit by Provider         Review of Systems   ROS COMP: Constitutional, HEENT, cardiovascular, pulmonary, GI, , musculoskeletal, neuro, skin, endocrine and psych systems are negative, except as otherwise noted.      Objective    /80 (BP Location: Right arm, Patient Position: Chair, Cuff Size: Adult Large)   Pulse 96   Temp 98  F (36.7  C) (Oral)   Resp 16   Ht 1.651 m (5' 5\")   Wt 65 kg (143 lb 6.4 oz)   SpO2 97%   Breastfeeding? No   BMI 23.86 kg/m    Body mass index is 23.86 kg/m .  Physical Exam   GENERAL: healthy, alert and no distress  NECK: no adenopathy, no asymmetry, masses, or scars and thyroid normal to palpation  RESP: lungs clear to auscultation - no rales, rhonchi or wheezes  CV: regular rate and rhythm, normal S1 S2, no S3 or S4, no murmur, click or rub, no peripheral edema and peripheral pulses strong  ABDOMEN: soft, nontender, no hepatosplenomegaly, no masses and bowel sounds normal  MS: no gross musculoskeletal defects noted, no edema  NEURO: Normal strength and tone, mentation intact " and speech normal  PSYCH: mentation appears normal, affect normal/bright        Assessment & Plan     1. BUNN (dyspnea on exertion)  Will check stress echo   - EKG 12-lead complete w/read - Clinics  - Echocardiogram Exercise Stress; Future    2. Dysuria  Will check   - UA with Microscopic reflex to Culture  - Urine Culture Aerobic Bacterial    3. Need for prophylactic vaccination and inoculation against influenza     - INFLUENZA (HIGH DOSE) 3 VALENT VACCINE [47086]  - ADMIN INFLUENZA (For MEDICARE Patients ONLY) []       No follow-ups on file.    Donna Baird MD  [facility]

## 2019-09-26 LAB
BACTERIA SPEC CULT: NORMAL
SPECIMEN SOURCE: NORMAL

## 2019-10-04 ENCOUNTER — HOSPITAL ENCOUNTER (OUTPATIENT)
Dept: CARDIOLOGY | Facility: CLINIC | Age: 84
Discharge: HOME | End: 2019-10-04
Attending: INTERNAL MEDICINE | Admitting: INTERNAL MEDICINE
Payer: COMMERCIAL

## 2019-10-04 ENCOUNTER — TELEPHONE (OUTPATIENT)
Dept: INTERNAL MEDICINE | Facility: CLINIC | Age: 84
End: 2019-10-04

## 2019-10-04 DIAGNOSIS — R06.02 SOB (SHORTNESS OF BREATH): Primary | ICD-10-CM

## 2019-10-04 DIAGNOSIS — R06.09 DOE (DYSPNEA ON EXERTION): ICD-10-CM

## 2019-10-04 PROCEDURE — 25500064 ZZH RX 255 OP 636: Performed by: INTERNAL MEDICINE

## 2019-10-04 PROCEDURE — 93306 TTE W/DOPPLER COMPLETE: CPT | Mod: 26 | Performed by: INTERNAL MEDICINE

## 2019-10-04 PROCEDURE — 40000264 ECHOCARDIOGRAM COMPLETE

## 2019-10-04 RX ADMIN — HUMAN ALBUMIN MICROSPHERES AND PERFLUTREN 3 ML: 10; .22 INJECTION, SOLUTION INTRAVENOUS at 15:00

## 2019-10-04 NOTE — TELEPHONE ENCOUNTER
Safia at  Cardiopulmonary called to advise they were unable to do the Treadmill Stress Echo.  When the patient was hooked up she was SOB and her heart rate went up to 140 within a minute.  They did not feel that it was safe to be on the treadmill.  They did complete a full Echo.  They are suggesting a Nuclear Gisel Scan rather than the stress on the treadmill.

## 2019-10-04 NOTE — PROGRESS NOTES
Patient presented today for a treadmill stress echo test. Her resting EKG was tachycardic at 112 bpm and she was feeling short of breath. We tested the treadmill and she was unable to tolerate 1.0mph, her HR went up to 140 bpm. Discussed the case with Dr. Meneses who thought we should not stress her today due to tachycardia at rest. She will still have an echo. If echo looks ok he recommends a lexiscan instead.

## 2019-10-16 ENCOUNTER — TELEPHONE (OUTPATIENT)
Dept: INTERNAL MEDICINE | Facility: CLINIC | Age: 84
End: 2019-10-16

## 2019-10-16 ENCOUNTER — HOSPITAL ENCOUNTER (OUTPATIENT)
Dept: CARDIOLOGY | Facility: CLINIC | Age: 84
Discharge: HOME | End: 2019-10-16
Attending: INTERNAL MEDICINE | Admitting: INTERNAL MEDICINE
Payer: COMMERCIAL

## 2019-10-16 ENCOUNTER — HOSPITAL ENCOUNTER (OUTPATIENT)
Dept: NUCLEAR MEDICINE | Facility: CLINIC | Age: 84
Setting detail: NUCLEAR MEDICINE
Discharge: HOME | End: 2019-10-16
Attending: INTERNAL MEDICINE | Admitting: INTERNAL MEDICINE
Payer: COMMERCIAL

## 2019-10-16 VITALS — DIASTOLIC BLOOD PRESSURE: 96 MMHG | SYSTOLIC BLOOD PRESSURE: 181 MMHG

## 2019-10-16 DIAGNOSIS — D84.9 IMMUNOSUPPRESSED STATUS (H): ICD-10-CM

## 2019-10-16 DIAGNOSIS — R06.09 DOE (DYSPNEA ON EXERTION): Primary | ICD-10-CM

## 2019-10-16 DIAGNOSIS — R06.02 SOB (SHORTNESS OF BREATH): ICD-10-CM

## 2019-10-16 DIAGNOSIS — Z94.0 S/P KIDNEY TRANSPLANT: ICD-10-CM

## 2019-10-16 PROCEDURE — A9502 TC99M TETROFOSMIN: HCPCS | Performed by: INTERNAL MEDICINE

## 2019-10-16 PROCEDURE — 25000128 H RX IP 250 OP 636

## 2019-10-16 PROCEDURE — 93018 CV STRESS TEST I&R ONLY: CPT | Performed by: INTERNAL MEDICINE

## 2019-10-16 PROCEDURE — 93017 CV STRESS TEST TRACING ONLY: CPT

## 2019-10-16 PROCEDURE — 78452 HT MUSCLE IMAGE SPECT MULT: CPT | Mod: 26 | Performed by: INTERNAL MEDICINE

## 2019-10-16 PROCEDURE — 34300033 ZZH RX 343: Performed by: INTERNAL MEDICINE

## 2019-10-16 PROCEDURE — 78452 HT MUSCLE IMAGE SPECT MULT: CPT

## 2019-10-16 PROCEDURE — 93016 CV STRESS TEST SUPVJ ONLY: CPT | Performed by: INTERNAL MEDICINE

## 2019-10-16 RX ORDER — REGADENOSON 0.08 MG/ML
INJECTION, SOLUTION INTRAVENOUS
Status: COMPLETED
Start: 2019-10-16 | End: 2019-10-16

## 2019-10-16 RX ADMIN — TETROFOSMIN 31.5 MCI.: 1.38 INJECTION, POWDER, LYOPHILIZED, FOR SOLUTION INTRAVENOUS at 14:35

## 2019-10-16 RX ADMIN — TETROFOSMIN 11 MCI.: 1.38 INJECTION, POWDER, LYOPHILIZED, FOR SOLUTION INTRAVENOUS at 12:57

## 2019-10-16 RX ADMIN — REGADENOSON 0.4 MG: 0.08 INJECTION, SOLUTION INTRAVENOUS at 14:34

## 2019-10-16 NOTE — PROGRESS NOTES
Pre-procedure:  Are you having any pain or shortness of breath (prior to starting)? no  Initial vital signs: /96, HR 88, RR 20  Allergies reviewed: yes   Rhythm: Sinus  Medications taken within 48 hours of procedure: denie   Any nitrates within the last 48 hours:denies  Last Caffeine: 48 hour  Lung sounds: CTA, no wheezing, crackles or rtx  Health History (COPD, Asthma, etc): unsure           Procedure: Lexiscan  Reaction/symptoms after receiving Gisel injection: Headache  Intensity of Pain: 7  Rhythm: 's  1. Vital Signs:/74, , RR 20  2. Vital Signs:/74, , RR 20     Reversal agent: coffee    Post:   Resolution of symptoms?: headache continues  Vital signs: /75, , RR 20  Rhythm: SR  Walk: NO  Comment: coffee provided; headache continues  Return to Radiology

## 2019-10-16 NOTE — TELEPHONE ENCOUNTER
Received call from [facility].  This patient was unable to walk on treadmill and cardiologist recommended patient have a nuclear Lexiscan test instead of treadmill.  The order was placed but is not correct.  It needs to read Nuclear Lexiscan.  ANSON Lentz R.N.

## 2019-10-22 ENCOUNTER — TRANSFERRED RECORDS (OUTPATIENT)
Dept: HEALTH INFORMATION MANAGEMENT | Facility: CLINIC | Age: 84
End: 2019-10-22

## 2019-11-06 ENCOUNTER — TELEPHONE (OUTPATIENT)
Dept: PALLIATIVE MEDICINE | Facility: CLINIC | Age: 84
End: 2019-11-06

## 2019-11-06 ENCOUNTER — TRANSFERRED RECORDS (OUTPATIENT)
Dept: HEALTH INFORMATION MANAGEMENT | Facility: CLINIC | Age: 84
End: 2019-11-06

## 2019-11-06 NOTE — TELEPHONE ENCOUNTER
Received 11-6-2019 at 3:09 PM    Incoming fax from [facility] (Melissa Wright NP) for a lumbar steroid injection vs facet joint injections for lumbar pain (H04.5).    Routing to scheduling coordinators.    Please close encounter once it has been scheduled.      Ayaka Belgium  Patient Representative  [facility]

## 2019-11-08 ENCOUNTER — TRANSFERRED RECORDS (OUTPATIENT)
Dept: HEALTH INFORMATION MANAGEMENT | Facility: CLINIC | Age: 84
End: 2019-11-08

## 2019-11-08 NOTE — TELEPHONE ENCOUNTER
Pre-screening Questions for Radiology Injections:    Injection to be done at which interventional clinic site? [facility]    Instruct patient to arrive as directed prior to the scheduled appointment time:    [place] minutes before      [place]: 30 minutes before; if IV needed 1 hour before     Procedure ordered by [facility]    Procedure ordered? lumbar steroid injection vs facet joint injections       Transforaminal Cervical LINDA - Dr. Janett Escobedo ONLY    What insurance would patient like us to bill for this procedure? [employer]      Worker's comp or MVA (motor vehicle accident) -Any injection DO NOT SCHEDULE and route to Hattie Cristobal.      [employer] insurance - For SI joint injections, DO NOT SCHEDULE and route Hattie Cristobal.       [employer] - Any injection besides hip/shoulder/knee joint DO NOT SCHEDULE and route to Hattie Cristobal. She will obtain PA and call pt back to schedule procedure or notify pt of denial.       [employer]-Route to Hattie for review      **BCBS- ALL need to be routed to [place] for review if a PA is needed**      IF SCHEDULING IN [place] AND NEEDS A PA, IT IS OKAY TO SCHEDULE. [place] HANDLES THEIR OWN PA'S AFTER THE PATIENT IS SCHEDULED. PLEASE SCHEDULE AT LEAST 1 WEEK OUT SO A PA CAN BE OBTAINED.    Any chance of pregnancy? NO   If YES, do NOT schedule and route to RN pool    Is an  needed? No     Patient has a drive home? (mandatory) YES: informed     Is patient taking any blood thinners (plavix, coumadin, jantoven, warfarin, heparin, pradaxa or dabigatran )? No   If hold needed, do NOT schedule, route to RN pool     Is patient taking any aspirin products (includes Excedrin and Fiorinal)? Yes - Pt takes 81mg daily; instructed to hold 0 day(s) prior to procedure.      If more than 325mg/day do NOT schedule; route to RN pool     For CERVICAL procedures, hold all aspirin products for 6 days.     Tell pt that if aspirin product is not held for 6 days, the procedure WILL BE  cancelled.      Does the patient have a bleeding or clotting disorder? No     If YES, okay to schedule AND route to RN nurse pool    For any patients with platelet count <100, must be forwarded to provider    Is patient diabetic?  No  If YES, instruct them to bring their glucometer.    Does patient have an active infection or treated for one within the past week? No     Is patient currently taking any antibiotics?  No     For patients on chronic, preventative, or prophylactic antibiotics, procedures may be scheduled.     For patients on antibiotics for active or recent infection:antibiotic course must have been completed for 4 days    Is patient currently taking any steroid medications? (i.e. Prednisone, Medrol)  No     For patients on steroid medications, course must have been completed for 4 days    Reviewed with patient:  If you are started on any steroids or antibiotics between now and your appointment, you must contact us because the procedure may need to be cancelled.  Yes    Is patient actively being treated for cancer or immunocompromised? No  If YES, do NOT schedule and route to RN pool     Are you able to get on and off an exam table with minimal or no assistance? Yes  If NO, do NOT schedule and route to RN pool    Are you able to roll over and lay on your stomach with minimal or no assistance? Yes  If NO, do NOT schedule and route to RN pool     Any allergies to contrast dye, iodine, shellfish, or numbing and steroid medications? No  If YES, route to RN pool AND add allergy information to appointment notes    Allergies: Patient has no known allergies.      Has the patient had a flu shot or any other vaccinations within 7 days before or after the procedure.  No     Does patient have an MRI/CT?  YES: 2019  Check Procedure Scheduling Grid to see if required.      Was the MRI done within the last 3 years?  Yes    If yes, where was the MRI done i.e.[facility], [facility], [facility], [facility] etc? [facility]       If no, do not schedule and route to RN pool    If MRI was not done at [facility], [facility] or [facility] do NOT schedule and route to RN pool.      If pt has an imaging disc, the injection MAY be scheduled but pt has to bring disc to appt.     If they show up without the disc the injection cannot be done    Reminders (please tell patient if applicable):       Instructed pt to arrive 30 minutes early for IV start if required. (Check Procedure Scheduling Grid)  Not Applicable      If celiac plexus block, informed patient NPO for 6 hours and that it is okay to take medications with sips of water, especially blood pressure medications  Not Applicable         If this is for a cervical procedure, informed patient that aspirin needs to be held for 6 days.   Not Applicable      For all patients not having spinal cord stimulator (SCS) trials or radiofrequency ablations (RFAs), informed patient:    IV sedation is not provided for this procedure.  If you feel that an oral anti-anxiety medication is needed, you can discuss this further with your referring provider or primary care provider.  The Pain Clinic provider will discuss specifics of what the procedure includes at your appointment.  Most procedures last 10-20 minutes.  We use numbing medications to help with any discomfort during the procedure.  Not Applicable      Do not schedule procedures requiring IV placement in the first appointment of the day or first appointment after lunch. Do NOT schedule at 0745, 0815 or 1245.       For patients 85 or older we recommend having an adult stay w/ them for the remainder of the day.       Does the patient have any questions?  NO  Casandra Clark  [facility]

## 2019-11-11 NOTE — PROGRESS NOTES
[facility] - Procedure Note    Date of Visit: 11/13/2019    Pre procedure Diagnosis: Lumbar facet arthropathy   Post procedure Diagnosis: Same  Procedure performed: Left L3-4 & L4-5 facet joint injections  Anesthesia: none  Complications: none  Operators: Lissy Reyes MD & ALVINA Montalvo (pain fellow)     Indications:   Cande Baird is a 84 year old female was sent by Melissa Wright CNP for lumbar facet joint injections.  They have a history of axial low back pain L>R that does not radiate to the legs.  Exam shows tenderness along the left lumbar paraspinal region and they have tried conservative treatment..    Options/alternatives, benefits and risks were discussed with the patient including bleeding, infection, flared pain, tissue trauma, exposure to radiation, reaction to medications including seizure, spinal cord injury, paralysis, weakness, numbness and headache.    Questions were answered to her satisfaction and she agrees to proceed. Voluntary informed consent was obtained and signed.     Vitals were reviewed: Yes  Allergies were reviewed:  Yes   Medications were reviewed:  Yes   Pre-procedure pain score: 5/10    LUMBAR MRI: 10/25/2019 [facility]      Procedure:  After getting informed consent, patient was brought into the procedure suite and was placed in a prone position on the procedure table.   A Pause for the Cause was performed.  Patient was prepped and draped in sterile fashion.     Under AP fluoroscopic guidance the L3-4, 4-5 facet joints on the left side were identified, and the C-arm was rotated obliquely to the affected side to open the joint space. A total of 1 ml of 1% lidocaine was injected at the needle entry point and needle tract. Then a 22 gauge 2.5 inch quincke type spinal needle was inserted and advanced under fluoroscopic guidance targeting the superior articular pillar of each joint. Once the needle made a contact with SAP, it was rotated and was then advanced into  the joint.    AP fluoroscopic views were obtained to confirm the needle placement. Then,  Omnipaque 300 contrast dye was injected after negative aspiration for heme and CSF in each joint, confirming appropriate placement.  A total of .5L of Omnipaque was used and 3mL was wasted.    The injection was then accomplished using a solution containing 1ml of 0.5% bupivacaine and 40mg of kenolog, divided between the 2 joints. The needles were removed..     Hemostasis was achieved, the area was cleaned, and bandaids were placed when appropriate.  The patient tolerated the procedure well, and was taken to the recovery room.    Images were saved to PACS.    Post-procedure pain score: 0/10  Follow-up includes:   -f/u phone call in one week  -f/u with the referring provider  -If she does not get pain relief from today's injection or she gets good pain relief but it does not last more than a few months I would recommend medial branch block to RFA as the next step.  This would be a LEFT L2,3,4 medial branch block procedure to start.  I did discuss this with the patient and sent a note to her PCP.       Lissy Reyes MD   [facility]

## 2019-11-13 ENCOUNTER — ANCILLARY PROCEDURE (OUTPATIENT)
Dept: GENERAL RADIOLOGY | Facility: CLINIC | Age: 84
End: 2019-11-13
Attending: ANESTHESIOLOGY
Payer: COMMERCIAL

## 2019-11-13 ENCOUNTER — RADIOLOGY INJECTION OFFICE VISIT (OUTPATIENT)
Dept: PALLIATIVE MEDICINE | Facility: CLINIC | Age: 84
End: 2019-11-13
Payer: COMMERCIAL

## 2019-11-13 VITALS — OXYGEN SATURATION: 96 % | SYSTOLIC BLOOD PRESSURE: 168 MMHG | DIASTOLIC BLOOD PRESSURE: 90 MMHG | HEART RATE: 98 BPM

## 2019-11-13 DIAGNOSIS — M47.819 FACET ARTHROPATHY: Primary | ICD-10-CM

## 2019-11-13 DIAGNOSIS — M54.16 LUMBAR RADICULOPATHY: ICD-10-CM

## 2019-11-13 DIAGNOSIS — M47.816 LUMBAR FACET ARTHROPATHY: ICD-10-CM

## 2019-11-13 PROCEDURE — 64494 INJ PARAVERT F JNT L/S 2 LEV: CPT | Mod: LT | Performed by: ANESTHESIOLOGY

## 2019-11-13 PROCEDURE — 64493 INJ PARAVERT F JNT L/S 1 LEV: CPT | Mod: LT | Performed by: ANESTHESIOLOGY

## 2019-11-13 NOTE — NURSING NOTE
Discharge Information    IV Discontiued Time:  NA    Amount of Fluid Infused:  NA    Discharge Criteria = When patient returns to baseline or as per MD order    Consciousness:  Pt is fully awake    Circulation:  BP +/- 20% of pre-procedure level. Pt will have her blood pressure rechecked at her facility that she lives at.    Respiration:  Patient is able to breathe deeply    O2 Sat:  Patient is able to maintain O2 Sat >92% on room air    Activity:  Moves 4 extremities on command    Ambulation:  Patient is able to stand and walk or stand and pivot into wheelchair    Dressing:  Clean/dry or No Dressing    Notes:   Discharge instructions and AVS given to patient    Patient meets criteria for discharge?  YES    Admitted to PCU?  No    Responsible adult present to accompany patient home?  Yes    Signature/Title:    Trinidad Parson RN Care Coordinator  [facility]

## 2019-11-13 NOTE — PATIENT INSTRUCTIONS
[facility] Procedure Discharge Instructions    Today you saw:   Dr. Lissy Reyes      Your procedure:    Facet joint injection      Medications used:  Lidocaine (anesthetic)  Bupivacaine (anesthetic) Triamcinolone (steroid)         Omnipaque (contrast)                Be cautious when walking as numbness and/or weakness in the legs may occur up to 6-8 hours after the procedure due to effect of the local anesthetic    Do not drive for 6 hours. The effect of the local anesthetic could slow your reflexes.     Avoid strenuous activity for the first 24 hours. You may resume your regular activities after that.     You may shower, however avoid swimming, tub baths or hot tubs for 24 hours following your procedure    You may have a mild to moderate increase in pain for several days following the injection.      You may use ice packs for 10-15 minutes, 3 to 4 times a day at the injection site for comfort    Do not use heat to painful areas for 6 to 8 hours. This will give the local anesthetic time to wear off and prevent you from accidentally burning your skin.    Unless you have been directed to avoid the use of anti-inflammatory medications (NSAIDS-ibuprofen, Aleve, Motrin), you may use these medications or Tylenol for pain control if needed.     With diabetes, check your blood sugar more frequently than usual as your blood sugar may be higher than normal for 10-14 days following a steroid injection. Contact your doctor who manages your diabetes if your blood sugar is higher than usual    Possible side effects of steroids that you may experience include flushing, elevated blood pressure, increased appetite, mild headaches and restlessness.  All of these symptoms will get better with time.    It may take up to 14 days for the steroid medication to start working although you may feel the effect as early as a few days after the procedure.     Follow up with your referring provider in 2-3 weeks      If you  experience any of the following, call the pain center line during work hours at [phone] or on-call physician after hours at [phone]:  -Fever over 100 degree F  -Swelling, bleeding, redness, drainage, warmth at the injection site  -Progressive weakness or numbness in your legs   -Loss of bowel or bladder function  -Unusual headache that is not relieved by Tylenol or your regular headache medication  -Unusual new onset of pain that is not improving

## 2019-11-13 NOTE — NURSING NOTE
Pre-procedure Intake    Have you been fasting? NA    If yes, for how long?     Are you taking a prescribed blood thinner such as coumadin, Plavix, Xarelto?    No    If yes, when did you take your last dose?     Do you take aspirin?  Yes -   ASA    If cervical procedure, have you held aspirin for 6 days?   NA    Do you have any allergies to contrast dye, iodine, steroid and/or numbing medications?  NO    Are you currently taking antibiotics or have an active infection?  NO    Have you had a fever/elevated temperature within the past week? NO    Are you currently taking oral steroids? NO    Do you have a ? Yes       Are you pregnant or breastfeeding?  Not Applicable    Are the vital signs normal?  No: BP:172/96 2nd:160/92

## 2019-11-13 NOTE — Clinical Note
I did an injection on this patient today.  She did not have a good experience with Melissa Wright CNP at [facility] and does not wish to return so I am sending you this instead.  I did a facet joint injection for her back pain today but discussed doing a medial branch block to RFA workup for her in the future.  If she gets good pain relief from the facet joint injections done today this will not be necessary.   Thanks!  Lissy

## 2019-12-02 ENCOUNTER — TELEPHONE (OUTPATIENT)
Dept: INTERNAL MEDICINE | Facility: CLINIC | Age: 84
End: 2019-12-02

## 2019-12-02 DIAGNOSIS — Z87.898 HISTORY OF URINE COLOR CHANGES: Primary | ICD-10-CM

## 2019-12-02 NOTE — TELEPHONE ENCOUNTER
We need a UA/UC orders are placed. I have ordered no recent labs on her. If they are by another physician then she needs to make an appointment.

## 2019-12-02 NOTE — TELEPHONE ENCOUNTER
Patient stated she thinks she has a UTI. Patient is a kidney transplant patient. Cloudy urine with a odor. Patient wants to see Dr Forbes about her most recent lab work as well. Ok to call and [place] [phone]

## 2019-12-02 NOTE — TELEPHONE ENCOUNTER
Pt called back. Lab only appointment scheduled 12/4/19. Pt will call back to scheduled an appointment with Dr. Baird.

## 2019-12-04 DIAGNOSIS — Z87.898 HISTORY OF URINE COLOR CHANGES: ICD-10-CM

## 2019-12-04 DIAGNOSIS — R82.90 NONSPECIFIC FINDING ON EXAMINATION OF URINE: Primary | ICD-10-CM

## 2019-12-04 LAB
ALBUMIN UR-MCNC: NEGATIVE MG/DL
APPEARANCE UR: CLEAR
BACTERIA #/AREA URNS HPF: ABNORMAL /HPF
BILIRUB UR QL STRIP: NEGATIVE
COLOR UR AUTO: YELLOW
GLUCOSE UR STRIP-MCNC: NEGATIVE MG/DL
HGB UR QL STRIP: NEGATIVE
KETONES UR STRIP-MCNC: NEGATIVE MG/DL
LEUKOCYTE ESTERASE UR QL STRIP: ABNORMAL
NITRATE UR QL: NEGATIVE
NON-SQ EPI CELLS #/AREA URNS LPF: ABNORMAL /LPF
PH UR STRIP: 7 PH (ref 5–7)
RBC #/AREA URNS AUTO: ABNORMAL /HPF
SOURCE: ABNORMAL
SP GR UR STRIP: 1.01 (ref 1–1.03)
UROBILINOGEN UR STRIP-ACNC: 0.2 EU/DL (ref 0.2–1)
WBC #/AREA URNS AUTO: ABNORMAL /HPF

## 2019-12-04 PROCEDURE — 87086 URINE CULTURE/COLONY COUNT: CPT | Performed by: INTERNAL MEDICINE

## 2019-12-04 PROCEDURE — 81001 URINALYSIS AUTO W/SCOPE: CPT | Performed by: INTERNAL MEDICINE

## 2019-12-05 LAB
BACTERIA SPEC CULT: NORMAL
SPECIMEN SOURCE: NORMAL

## 2019-12-16 ENCOUNTER — HEALTH MAINTENANCE LETTER (OUTPATIENT)
Age: 84
End: 2019-12-16

## 2020-01-12 ASSESSMENT — ENCOUNTER SYMPTOMS
EYE PAIN: 0
DIARRHEA: 0
COUGH: 1
FEVER: 0
ARTHRALGIAS: 0
JOINT SWELLING: 0
CONSTIPATION: 0
DYSURIA: 0
WEAKNESS: 1
DIZZINESS: 1
HEADACHES: 0
BREAST MASS: 0
PARESTHESIAS: 0
SORE THROAT: 0
ABDOMINAL PAIN: 0
FREQUENCY: 1
NAUSEA: 0
HEMATOCHEZIA: 0
CHILLS: 0
HEMATURIA: 0
HEARTBURN: 0
PALPITATIONS: 0
MYALGIAS: 0
NERVOUS/ANXIOUS: 1

## 2020-01-12 ASSESSMENT — ACTIVITIES OF DAILY LIVING (ADL): CURRENT_FUNCTION: SHOPPING REQUIRES ASSISTANCE

## 2020-01-14 ENCOUNTER — OFFICE VISIT (OUTPATIENT)
Dept: INTERNAL MEDICINE | Facility: CLINIC | Age: 85
End: 2020-01-14
Payer: COMMERCIAL

## 2020-01-14 VITALS
TEMPERATURE: 97.8 F | DIASTOLIC BLOOD PRESSURE: 80 MMHG | RESPIRATION RATE: 20 BRPM | HEART RATE: 128 BPM | BODY MASS INDEX: 24.24 KG/M2 | OXYGEN SATURATION: 95 % | SYSTOLIC BLOOD PRESSURE: 130 MMHG | WEIGHT: 142 LBS | HEIGHT: 64 IN

## 2020-01-14 DIAGNOSIS — Z00.00 ENCOUNTER FOR MEDICARE ANNUAL WELLNESS EXAM: Primary | ICD-10-CM

## 2020-01-14 DIAGNOSIS — R35.0 URINARY FREQUENCY: ICD-10-CM

## 2020-01-14 DIAGNOSIS — R32 URINARY INCONTINENCE, UNSPECIFIED TYPE: ICD-10-CM

## 2020-01-14 DIAGNOSIS — Z94.0 S/P KIDNEY TRANSPLANT: ICD-10-CM

## 2020-01-14 DIAGNOSIS — Z79.60 LONG-TERM USE OF IMMUNOSUPPRESSANT MEDICATION: ICD-10-CM

## 2020-01-14 DIAGNOSIS — F41.9 ANXIETY: ICD-10-CM

## 2020-01-14 DIAGNOSIS — D84.9 IMMUNOSUPPRESSED STATUS (H): ICD-10-CM

## 2020-01-14 LAB
ALBUMIN UR-MCNC: NEGATIVE MG/DL
APPEARANCE UR: CLEAR
BACTERIA #/AREA URNS HPF: ABNORMAL /HPF
BASOPHILS # BLD AUTO: 0 10E9/L (ref 0–0.2)
BASOPHILS NFR BLD AUTO: 0.2 %
BILIRUB UR QL STRIP: NEGATIVE
COLOR UR AUTO: YELLOW
DIFFERENTIAL METHOD BLD: ABNORMAL
EOSINOPHIL # BLD AUTO: 0.1 10E9/L (ref 0–0.7)
EOSINOPHIL NFR BLD AUTO: 1.1 %
ERYTHROCYTE [DISTWIDTH] IN BLOOD BY AUTOMATED COUNT: 15.1 % (ref 10–15)
GLUCOSE UR STRIP-MCNC: NEGATIVE MG/DL
HCT VFR BLD AUTO: 42.6 % (ref 35–47)
HGB BLD-MCNC: 13.3 G/DL (ref 11.7–15.7)
HGB UR QL STRIP: NEGATIVE
KETONES UR STRIP-MCNC: NEGATIVE MG/DL
LEUKOCYTE ESTERASE UR QL STRIP: ABNORMAL
LYMPHOCYTES # BLD AUTO: 0.8 10E9/L (ref 0.8–5.3)
LYMPHOCYTES NFR BLD AUTO: 9 %
MCH RBC QN AUTO: 30.1 PG (ref 26.5–33)
MCHC RBC AUTO-ENTMCNC: 31.2 G/DL (ref 31.5–36.5)
MCV RBC AUTO: 96 FL (ref 78–100)
MONOCYTES # BLD AUTO: 1.5 10E9/L (ref 0–1.3)
MONOCYTES NFR BLD AUTO: 15.9 %
NEUTROPHILS # BLD AUTO: 6.7 10E9/L (ref 1.6–8.3)
NEUTROPHILS NFR BLD AUTO: 73.8 %
NITRATE UR QL: NEGATIVE
NON-SQ EPI CELLS #/AREA URNS LPF: ABNORMAL /LPF
PH UR STRIP: 6 PH (ref 5–7)
PLATELET # BLD AUTO: 299 10E9/L (ref 150–450)
RBC # BLD AUTO: 4.42 10E12/L (ref 3.8–5.2)
RBC #/AREA URNS AUTO: ABNORMAL /HPF
SOURCE: ABNORMAL
SP GR UR STRIP: 1.01 (ref 1–1.03)
UROBILINOGEN UR STRIP-ACNC: 1 EU/DL (ref 0.2–1)
WBC # BLD AUTO: 9.1 10E9/L (ref 4–11)
WBC #/AREA URNS AUTO: ABNORMAL /HPF

## 2020-01-14 PROCEDURE — 36415 COLL VENOUS BLD VENIPUNCTURE: CPT | Performed by: NURSE PRACTITIONER

## 2020-01-14 PROCEDURE — 80053 COMPREHEN METABOLIC PANEL: CPT | Performed by: NURSE PRACTITIONER

## 2020-01-14 PROCEDURE — 81001 URINALYSIS AUTO W/SCOPE: CPT | Performed by: NURSE PRACTITIONER

## 2020-01-14 PROCEDURE — 85025 COMPLETE CBC W/AUTO DIFF WBC: CPT | Performed by: NURSE PRACTITIONER

## 2020-01-14 PROCEDURE — 82306 VITAMIN D 25 HYDROXY: CPT | Performed by: NURSE PRACTITIONER

## 2020-01-14 PROCEDURE — 99397 PER PM REEVAL EST PAT 65+ YR: CPT | Performed by: NURSE PRACTITIONER

## 2020-01-14 ASSESSMENT — ENCOUNTER SYMPTOMS
CHILLS: 0
HEMATURIA: 0
DIARRHEA: 0
HEADACHES: 0
EYE PAIN: 0
FREQUENCY: 1
DYSURIA: 0
NERVOUS/ANXIOUS: 1
PARESTHESIAS: 0
ABDOMINAL PAIN: 0
ARTHRALGIAS: 0
SORE THROAT: 0
DIZZINESS: 1
FEVER: 0
JOINT SWELLING: 0
CONSTIPATION: 0
NAUSEA: 0
MYALGIAS: 0
COUGH: 1
HEARTBURN: 0
HEMATOCHEZIA: 0
PALPITATIONS: 0
BREAST MASS: 0
WEAKNESS: 1

## 2020-01-14 ASSESSMENT — ACTIVITIES OF DAILY LIVING (ADL): CURRENT_FUNCTION: SHOPPING REQUIRES ASSISTANCE

## 2020-01-14 ASSESSMENT — MIFFLIN-ST. JEOR: SCORE: 1081.17

## 2020-01-14 NOTE — PROGRESS NOTES
"SUBJECTIVE:   Cande Baird is a 84 year old female who presents for Preventive Visit.      Are you in the first 12 months of your Medicare coverage?  No    Healthy Habits:     In general, how would you rate your overall health?  Good    Frequency of exercise:  4-5 days/week    Duration of exercise:  15-30 minutes    Do you usually eat at least 4 servings of fruit and vegetables a day, include whole grains    & fiber and avoid regularly eating high fat or \"junk\" foods?  Yes    Taking medications regularly:  Yes    Medication side effects:  Lightheadedness    Ability to successfully perform activities of daily living:  Shopping requires assistance    Home Safety:  No safety concerns identified    Hearing Impairment:  Difficulty following a conversation in a noisy restaurant or crowded room and difficulty understanding soft or whispered speech    In the past 6 months, have you been bothered by leaking of urine? Yes    In general, how would you rate your overall mental or emotional health?  Good      PHQ-2 Total Score: 2    Additional concerns today:  No    Do you feel safe in your environment? Yes    Have you ever done Advance Care Planning? (For example, a Health Directive, POLST, or a discussion with a medical provider or your loved ones about your wishes): Yes, advance care planning is on file.      Fall risk  Fallen 2 or more times in the past year?: No  Any fall with injury in the past year?: No    Cognitive Screening   1) Repeat 3 items (Leader, Season, Table)    2) Clock draw: NORMAL  3) 3 item recall: Recalls 3 objects  Results: 3 items recalled: COGNITIVE IMPAIRMENT LESS LIKELY    Mini-CogTM Copyright LETICIA Cheney. Licensed by the author for use in [facility]; reprinted with permission (mary@.[email or web address]). All rights reserved.      Do you have sleep apnea, excessive snoring or daytime drowsiness?: yes    Reviewed and updated as needed this visit by clinical staff  Tobacco  Allergies  Meds  Problems  Med " Hx  Surg Hx  Fam Hx  Soc Hx          Reviewed and updated as needed this visit by Provider  Tobacco  Allergies  Meds  Problems  Med Hx  Surg Hx  Fam Hx        Social History     Tobacco Use     Smoking status: Former Smoker     Types: Cigarettes     Last attempt to quit: 1988     Years since quittin.6     Smokeless tobacco: Never Used   Substance Use Topics     Alcohol use: Yes     Alcohol/week: 0.0 standard drinks         Alcohol Use 2020   Prescreen: >3 drinks/day or >7 drinks/week? No   Prescreen: >3 drinks/day or >7 drinks/week? -               Current providers sharing in care for this patient include:   Patient Care Team:  [facility] as PCP - General (Internal Medicine)  Donna Baird MD as Assigned PCP    The following health maintenance items are reviewed in Epic and correct as of today:  Health Maintenance   Topic Date Due     MEDICARE ANNUAL WELLNESS VISIT  10/25/2019     FALL RISK ASSESSMENT  2020     ADVANCE CARE PLANNING  2022     DTAP/TDAP/TD IMMUNIZATION (3 - Td) 2027     DEXA  Completed     PHQ-2  Completed     INFLUENZA VACCINE  Completed     PNEUMOCOCCAL IMMUNIZATION 65+ HIGH/HIGHEST RISK  Completed     ZOSTER IMMUNIZATION  Completed     IPV IMMUNIZATION  Aged Out     MENINGITIS IMMUNIZATION  Aged Out           Review of Systems   Constitutional: Negative for chills and fever.   HENT: Positive for hearing loss. Negative for congestion, ear pain and sore throat.    Eyes: Positive for visual disturbance. Negative for pain.   Respiratory: Positive for cough.    Cardiovascular: Negative for chest pain, palpitations and peripheral edema.   Gastrointestinal: Negative for abdominal pain, constipation, diarrhea, heartburn, hematochezia and nausea.   Breasts:  Negative for tenderness, breast mass and discharge.   Genitourinary: Positive for frequency, urgency and vaginal discharge. Negative for dysuria, genital sores, hematuria, pelvic pain  "and vaginal bleeding.   Musculoskeletal: Negative for arthralgias, joint swelling and myalgias.   Skin: Negative for rash.   Neurological: Positive for dizziness and weakness. Negative for headaches and paresthesias.   Psychiatric/Behavioral: Positive for mood changes. The patient is nervous/anxious.      Hearing loss -  did the answers - she does not feel this is true   Got hearing aids through [employer] - never wears it     Visual - glare of other cars bothers her   Sees eye doctor - told her not to drive at night if possible     Cough - had a dry cough a long time ago - did cough medication and was ok   Frequency urgency - kidney transplant   Wears liners    Gets tired a lot   After she takes her immunosuppressant she feels faint - for a short time   Her dose was decreased by nephrology   Has checked heart  Did PFT and normal     Nervous - anxious   No medication needed - does ok     [facility] gets her immunosuppressants   Sees kidney doctor soon   [facility]     Does not do mammogram or colon screening     [place]ives in [place] [facility]         OBJECTIVE:   /80   Pulse 128   Temp 97.8  F (36.6  C) (Oral)   Resp 20   Ht 1.613 m (5' 3.5\")   Wt 64.4 kg (142 lb)   SpO2 95%   BMI 24.76 kg/m   Estimated body mass index is 24.76 kg/m  as calculated from the following:    Height as of this encounter: 1.613 m (5' 3.5\").    Weight as of this encounter: 64.4 kg (142 lb).  Physical Exam  GENERAL: alert and no distress  RESP: lungs clear to auscultation - no rales, rhonchi or wheezes  CV: regular rate and rhythm  ABDOMEN: soft, nontender,  and bowel sounds normal  MS: no gross musculoskeletal defects noted, no edema  NEURO: Normal strength and tone, mentation intact and speech normal  PSYCH: mentation appears normal, affect normal/bright    Diagnostic Test Results:  Labs reviewed in Epic  Lab     ASSESSMENT / PLAN:   1. Encounter for Medicare annual wellness exam    - CBC with platelets and differential  - " "Comprehensive metabolic panel  - Vitamin D Deficiency    2. Long-term use of immunosuppressant medication  She sees nephrology   Will make an appointment   - CBC with platelets and differential  - Comprehensive metabolic panel  - Vitamin D Deficiency    3. Immunosuppressed status (H)  Kidney transplant status   - CBC with platelets and differential  - Comprehensive metabolic panel  - Vitamin D Deficiency    4. S/P kidney transplant    - Comprehensive metabolic panel    5. Anxiety  Stable - no medication   - CBC with platelets and differential  - Vitamin D Deficiency    6. Urinary frequency    - UA reflex to Microscopic and Culture    7. Urinary incontinence, unspecified type    - UA reflex to Microscopic and Culture    COUNSELING:  Reviewed preventive health counseling, as reflected in patient instructions       Regular exercise       Healthy diet/nutrition       Osteoporosis Prevention/Bone Health    Estimated body mass index is 24.76 kg/m  as calculated from the following:    Height as of this encounter: 1.613 m (5' 3.5\").    Weight as of this encounter: 64.4 kg (142 lb).         reports that she quit smoking about 31 years ago. Her smoking use included cigarettes. She has never used smokeless tobacco.      Appropriate preventive services were discussed with this patient, including applicable screening as appropriate for cardiovascular disease, diabetes, osteopenia/osteoporosis, and glaucoma.  As appropriate for age/gender, discussed screening for colorectal cancer, prostate cancer, breast cancer, and cervical cancer. Checklist reviewing preventive services available has been given to the patient.    Reviewed patients plan of care and provided an AVS. The Basic Care Plan (routine screening as documented in Health Maintenance) for Cande meets the Care Plan requirement. This Care Plan has been established and reviewed with the Patient.    Counseling Resources:  ATP IV Guidelines  Pooled Cohorts Equation " Calculator  Breast Cancer Risk Calculator  FRAX Risk Assessment  ICSI Preventive Guidelines  Dietary Guidelines for Americans, 2010  USDA's MyPlate  ASA Prophylaxis  Lung CA Screening    AMEE Rodriguez CNP  [facility]    Identified Health Risks:

## 2020-01-14 NOTE — PATIENT INSTRUCTIONS
Lab in [address]            Patient Education   Personalized Prevention Plan  You are due for the preventive services outlined below.  Your care team is available to assist you in scheduling these services.  If you have already completed any of these items, please share that information with your care team to update in your medical record.  Health Maintenance Due   Topic Date Due     Annual Wellness Visit  10/25/2019

## 2020-01-15 LAB
ALBUMIN SERPL-MCNC: 3.8 G/DL (ref 3.4–5)
ALP SERPL-CCNC: 118 U/L (ref 40–150)
ALT SERPL W P-5'-P-CCNC: 14 U/L (ref 0–50)
ANION GAP SERPL CALCULATED.3IONS-SCNC: 8 MMOL/L (ref 3–14)
AST SERPL W P-5'-P-CCNC: 15 U/L (ref 0–45)
BILIRUB SERPL-MCNC: 0.6 MG/DL (ref 0.2–1.3)
BUN SERPL-MCNC: 15 MG/DL (ref 7–30)
CALCIUM SERPL-MCNC: 9.6 MG/DL (ref 8.5–10.1)
CHLORIDE SERPL-SCNC: 96 MMOL/L (ref 94–109)
CO2 SERPL-SCNC: 28 MMOL/L (ref 20–32)
CREAT SERPL-MCNC: 0.79 MG/DL (ref 0.52–1.04)
DEPRECATED CALCIDIOL+CALCIFEROL SERPL-MC: 43 UG/L (ref 20–75)
GFR SERPL CREATININE-BSD FRML MDRD: 70 ML/MIN/{1.73_M2}
GLUCOSE SERPL-MCNC: 105 MG/DL (ref 70–99)
POTASSIUM SERPL-SCNC: 4.4 MMOL/L (ref 3.4–5.3)
PROT SERPL-MCNC: 7.8 G/DL (ref 6.8–8.8)
SODIUM SERPL-SCNC: 132 MMOL/L (ref 133–144)

## 2020-01-23 ENCOUNTER — TRANSFERRED RECORDS (OUTPATIENT)
Dept: HEALTH INFORMATION MANAGEMENT | Facility: CLINIC | Age: 85
End: 2020-01-23

## 2020-02-10 ENCOUNTER — TRANSFERRED RECORDS (OUTPATIENT)
Dept: HEALTH INFORMATION MANAGEMENT | Facility: CLINIC | Age: 85
End: 2020-02-10

## 2020-06-16 ENCOUNTER — OFFICE VISIT (OUTPATIENT)
Dept: INTERNAL MEDICINE | Facility: CLINIC | Age: 85
End: 2020-06-16
Payer: COMMERCIAL

## 2020-06-16 ENCOUNTER — ANCILLARY PROCEDURE (OUTPATIENT)
Dept: GENERAL RADIOLOGY | Facility: CLINIC | Age: 85
End: 2020-06-16
Attending: INTERNAL MEDICINE
Payer: COMMERCIAL

## 2020-06-16 VITALS
WEIGHT: 144.7 LBS | OXYGEN SATURATION: 95 % | RESPIRATION RATE: 18 BRPM | HEART RATE: 125 BPM | HEIGHT: 64 IN | TEMPERATURE: 97.8 F | BODY MASS INDEX: 24.7 KG/M2 | DIASTOLIC BLOOD PRESSURE: 88 MMHG | SYSTOLIC BLOOD PRESSURE: 154 MMHG

## 2020-06-16 DIAGNOSIS — N25.81 SECONDARY HYPERPARATHYROIDISM OF RENAL ORIGIN (H): ICD-10-CM

## 2020-06-16 DIAGNOSIS — Z94.0 S/P KIDNEY TRANSPLANT: ICD-10-CM

## 2020-06-16 DIAGNOSIS — M54.2 CERVICALGIA: Primary | ICD-10-CM

## 2020-06-16 DIAGNOSIS — I10 ESSENTIAL HYPERTENSION: ICD-10-CM

## 2020-06-16 DIAGNOSIS — D84.9 IMMUNOSUPPRESSED STATUS (H): ICD-10-CM

## 2020-06-16 PROCEDURE — 72040 X-RAY EXAM NECK SPINE 2-3 VW: CPT

## 2020-06-16 PROCEDURE — 99214 OFFICE O/P EST MOD 30 MIN: CPT | Performed by: INTERNAL MEDICINE

## 2020-06-16 ASSESSMENT — MIFFLIN-ST. JEOR: SCORE: 1093.41

## 2020-06-16 NOTE — NURSING NOTE
"BP (!) 154/88   Pulse 125   Temp 97.8  F (36.6  C) (Oral)   Resp 18   Ht 1.613 m (5' 3.5\")   Wt 65.6 kg (144 lb 11.2 oz)   SpO2 95%   BMI 25.23 kg/m    Patient complains of sever back and neck pain that wont go away.  "
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
## 2020-06-16 NOTE — PATIENT INSTRUCTIONS
Someone from the Pain Clinic should be contacting you to set up a consult.   Maybe you could be a candidate for another injection, which helped your back the end of last year.     Due for next annual wellness exam around January 2021.

## 2020-06-17 ENCOUNTER — TELEPHONE (OUTPATIENT)
Dept: PALLIATIVE MEDICINE | Facility: CLINIC | Age: 85
End: 2020-06-17

## 2020-06-17 DIAGNOSIS — M54.2 CERVICALGIA: Primary | ICD-10-CM

## 2020-06-17 NOTE — TELEPHONE ENCOUNTER
Called pt to schedule an evaluation for our comprehensive pain management program. Pt stated that she was only looking for an injection.    Routing to referring provider to clarify the order.    Yamilex JARRETT    [facility]

## 2020-06-17 NOTE — TELEPHONE ENCOUNTER
Please place new order under code 9050.118. Order should state injection to be performed or an interventional evaluation if injection is unknown.    Yamilex JARRETT    [facility]

## 2020-06-18 NOTE — TELEPHONE ENCOUNTER
Patient also calls regarding injection order.     She states she has had cortisone injections before in her low back, but she does not know what type injection she would need for her. She asks that the order be updated ASAP so she can get scheduled for appointment.

## 2020-06-19 NOTE — TELEPHONE ENCOUNTER
Unclear is patient being referred for procedural evaluation or actual procedure.  If it is for an actual procedure and patient has upper neck pain would recommend a cervical facet joint injection at C2-3, 3-4, if lower neck pain would recommend C5-6, C6-7

## 2020-06-19 NOTE — TELEPHONE ENCOUNTER
Order received for Procedure Order TBD by pain provider.     What is your diagnosis for the patient's pain? Cervicalgia, likely combination of osteoarthritis of cervical spine with associated paraspinal muscle spasm.    Routing to interventionists pool to determine injection.    Yamilex JARRETT    [facility]

## 2020-06-19 NOTE — TELEPHONE ENCOUNTER
Called pt to schedule cervical facet joint injection. Pt is not interested at this time, will call back to schedule if she changes her mind.    Yamilex JARRETT    [facility]

## 2021-01-01 ENCOUNTER — TELEPHONE (OUTPATIENT)
Dept: INTERNAL MEDICINE | Facility: CLINIC | Age: 86
End: 2021-01-01

## 2021-01-01 ENCOUNTER — HOSPITAL ENCOUNTER (EMERGENCY)
Facility: CLINIC | Age: 86
Discharge: HOME | End: 2021-08-09
Attending: EMERGENCY MEDICINE | Admitting: EMERGENCY MEDICINE
Payer: COMMERCIAL

## 2021-01-01 ENCOUNTER — HEALTH MAINTENANCE LETTER (OUTPATIENT)
Age: 86
End: 2021-01-01

## 2021-01-01 ENCOUNTER — OFFICE VISIT (OUTPATIENT)
Dept: INTERNAL MEDICINE | Facility: CLINIC | Age: 86
End: 2021-01-01
Payer: COMMERCIAL

## 2021-01-01 ENCOUNTER — MEDICAL CORRESPONDENCE (OUTPATIENT)
Dept: HEALTH INFORMATION MANAGEMENT | Facility: CLINIC | Age: 86
End: 2021-01-01
Payer: COMMERCIAL

## 2021-01-01 ENCOUNTER — TELEPHONE (OUTPATIENT)
Dept: NEPHROLOGY | Facility: CLINIC | Age: 86
End: 2021-01-01

## 2021-01-01 ENCOUNTER — OFFICE VISIT (OUTPATIENT)
Dept: URGENT CARE | Facility: URGENT CARE | Age: 86
End: 2021-01-01
Payer: COMMERCIAL

## 2021-01-01 ENCOUNTER — APPOINTMENT (OUTPATIENT)
Dept: GENERAL RADIOLOGY | Facility: CLINIC | Age: 86
End: 2021-01-01
Attending: EMERGENCY MEDICINE
Payer: COMMERCIAL

## 2021-01-01 ENCOUNTER — APPOINTMENT (OUTPATIENT)
Dept: ULTRASOUND IMAGING | Facility: CLINIC | Age: 86
DRG: 699 | End: 2021-01-01
Attending: EMERGENCY MEDICINE
Payer: COMMERCIAL

## 2021-01-01 ENCOUNTER — TELEPHONE (OUTPATIENT)
Dept: TRANSPLANT | Facility: CLINIC | Age: 86
End: 2021-01-01

## 2021-01-01 ENCOUNTER — LAB (OUTPATIENT)
Dept: LAB | Facility: CLINIC | Age: 86
End: 2021-01-01
Attending: INTERNAL MEDICINE
Payer: COMMERCIAL

## 2021-01-01 ENCOUNTER — PATIENT OUTREACH (OUTPATIENT)
Dept: CARE COORDINATION | Facility: CLINIC | Age: 86
End: 2021-01-01

## 2021-01-01 ENCOUNTER — APPOINTMENT (OUTPATIENT)
Dept: PHYSICAL THERAPY | Facility: CLINIC | Age: 86
End: 2021-01-01
Attending: HOSPITALIST
Payer: COMMERCIAL

## 2021-01-01 ENCOUNTER — OFFICE VISIT (OUTPATIENT)
Dept: CARDIOLOGY | Facility: CLINIC | Age: 86
End: 2021-01-01
Payer: COMMERCIAL

## 2021-01-01 ENCOUNTER — HOSPITAL ENCOUNTER (OUTPATIENT)
Dept: NUCLEAR MEDICINE | Facility: CLINIC | Age: 86
Setting detail: NUCLEAR MEDICINE
End: 2021-10-05
Attending: PHYSICIAN ASSISTANT
Payer: COMMERCIAL

## 2021-01-01 ENCOUNTER — APPOINTMENT (OUTPATIENT)
Dept: CARDIOLOGY | Facility: CLINIC | Age: 86
End: 2021-01-01
Attending: INTERNAL MEDICINE
Payer: COMMERCIAL

## 2021-01-01 ENCOUNTER — HOSPITAL ENCOUNTER (OUTPATIENT)
Facility: CLINIC | Age: 86
End: 2021-01-01
Attending: SURGERY | Admitting: SURGERY
Payer: COMMERCIAL

## 2021-01-01 ENCOUNTER — HOSPITAL ENCOUNTER (OUTPATIENT)
Dept: CARDIOLOGY | Facility: CLINIC | Age: 86
End: 2021-10-05
Attending: PHYSICIAN ASSISTANT
Payer: COMMERCIAL

## 2021-01-01 ENCOUNTER — HOSPITAL ENCOUNTER (OUTPATIENT)
Facility: CLINIC | Age: 86
Setting detail: OBSERVATION
Discharge: HOME | End: 2021-09-20
Attending: EMERGENCY MEDICINE | Admitting: INTERNAL MEDICINE
Payer: COMMERCIAL

## 2021-01-01 ENCOUNTER — HOSPITAL ENCOUNTER (OUTPATIENT)
Dept: CT IMAGING | Facility: CLINIC | Age: 86
Discharge: HOME | End: 2021-08-18
Attending: INTERNAL MEDICINE | Admitting: INTERNAL MEDICINE
Payer: COMMERCIAL

## 2021-01-01 ENCOUNTER — NURSE TRIAGE (OUTPATIENT)
Dept: NURSING | Facility: CLINIC | Age: 86
End: 2021-01-01

## 2021-01-01 ENCOUNTER — HOSPITAL ENCOUNTER (OUTPATIENT)
Dept: CARDIOLOGY | Facility: CLINIC | Age: 86
Discharge: HOME | End: 2021-08-17
Attending: EMERGENCY MEDICINE | Admitting: EMERGENCY MEDICINE
Payer: COMMERCIAL

## 2021-01-01 ENCOUNTER — TELEPHONE (OUTPATIENT)
Dept: INTERNAL MEDICINE | Facility: CLINIC | Age: 86
End: 2021-01-01
Payer: COMMERCIAL

## 2021-01-01 ENCOUNTER — HOSPITAL ENCOUNTER (INPATIENT)
Facility: CLINIC | Age: 86
LOS: 2 days | Discharge: HOME | DRG: 699 | End: 2021-08-26
Attending: EMERGENCY MEDICINE | Admitting: SURGERY
Payer: COMMERCIAL

## 2021-01-01 ENCOUNTER — OFFICE VISIT (OUTPATIENT)
Dept: CARDIOLOGY | Facility: CLINIC | Age: 86
End: 2021-01-01
Attending: PHYSICIAN ASSISTANT
Payer: COMMERCIAL

## 2021-01-01 ENCOUNTER — LAB (OUTPATIENT)
Dept: LAB | Facility: CLINIC | Age: 86
End: 2021-01-01
Payer: COMMERCIAL

## 2021-01-01 VITALS
RESPIRATION RATE: 16 BRPM | SYSTOLIC BLOOD PRESSURE: 161 MMHG | OXYGEN SATURATION: 95 % | DIASTOLIC BLOOD PRESSURE: 103 MMHG | HEART RATE: 88 BPM | TEMPERATURE: 97.1 F

## 2021-01-01 VITALS
HEART RATE: 98 BPM | TEMPERATURE: 97.9 F | DIASTOLIC BLOOD PRESSURE: 112 MMHG | OXYGEN SATURATION: 97 % | SYSTOLIC BLOOD PRESSURE: 180 MMHG

## 2021-01-01 VITALS
BODY MASS INDEX: 21.53 KG/M2 | HEART RATE: 82 BPM | HEIGHT: 63 IN | WEIGHT: 121.5 LBS | DIASTOLIC BLOOD PRESSURE: 64 MMHG | OXYGEN SATURATION: 94 % | SYSTOLIC BLOOD PRESSURE: 110 MMHG

## 2021-01-01 VITALS
HEART RATE: 64 BPM | TEMPERATURE: 98.1 F | SYSTOLIC BLOOD PRESSURE: 144 MMHG | HEIGHT: 60 IN | RESPIRATION RATE: 16 BRPM | DIASTOLIC BLOOD PRESSURE: 69 MMHG | OXYGEN SATURATION: 95 % | BODY MASS INDEX: 23.85 KG/M2 | WEIGHT: 121.5 LBS

## 2021-01-01 VITALS
HEART RATE: 118 BPM | BODY MASS INDEX: 23.29 KG/M2 | RESPIRATION RATE: 24 BRPM | SYSTOLIC BLOOD PRESSURE: 106 MMHG | DIASTOLIC BLOOD PRESSURE: 74 MMHG | TEMPERATURE: 99.5 F | WEIGHT: 133.6 LBS | OXYGEN SATURATION: 96 %

## 2021-01-01 VITALS
WEIGHT: 129.85 LBS | BODY MASS INDEX: 23.01 KG/M2 | DIASTOLIC BLOOD PRESSURE: 81 MMHG | OXYGEN SATURATION: 93 % | HEIGHT: 63 IN | RESPIRATION RATE: 16 BRPM | SYSTOLIC BLOOD PRESSURE: 165 MMHG | HEART RATE: 105 BPM | TEMPERATURE: 99 F

## 2021-01-01 VITALS
WEIGHT: 118.4 LBS | SYSTOLIC BLOOD PRESSURE: 114 MMHG | HEART RATE: 120 BPM | BODY MASS INDEX: 20.98 KG/M2 | HEIGHT: 63 IN | DIASTOLIC BLOOD PRESSURE: 72 MMHG

## 2021-01-01 VITALS
OXYGEN SATURATION: 95 % | HEIGHT: 63 IN | DIASTOLIC BLOOD PRESSURE: 89 MMHG | SYSTOLIC BLOOD PRESSURE: 158 MMHG | HEART RATE: 110 BPM | TEMPERATURE: 97.1 F | WEIGHT: 131.5 LBS | RESPIRATION RATE: 14 BRPM | BODY MASS INDEX: 23.3 KG/M2

## 2021-01-01 VITALS — SYSTOLIC BLOOD PRESSURE: 134 MMHG | BODY MASS INDEX: 24.02 KG/M2 | DIASTOLIC BLOOD PRESSURE: 79 MMHG | WEIGHT: 123 LBS

## 2021-01-01 VITALS
HEIGHT: 63 IN | HEART RATE: 69 BPM | WEIGHT: 120.6 LBS | BODY MASS INDEX: 21.37 KG/M2 | DIASTOLIC BLOOD PRESSURE: 75 MMHG | SYSTOLIC BLOOD PRESSURE: 128 MMHG | OXYGEN SATURATION: 97 %

## 2021-01-01 VITALS — HEART RATE: 113 BPM

## 2021-01-01 DIAGNOSIS — I10 ESSENTIAL HYPERTENSION: ICD-10-CM

## 2021-01-01 DIAGNOSIS — I48.0 PAF (PAROXYSMAL ATRIAL FIBRILLATION) (H): Primary | ICD-10-CM

## 2021-01-01 DIAGNOSIS — Z94.0 S/P KIDNEY TRANSPLANT: ICD-10-CM

## 2021-01-01 DIAGNOSIS — R11.0 NAUSEA: Primary | ICD-10-CM

## 2021-01-01 DIAGNOSIS — I48.0 PAROXYSMAL ATRIAL FIBRILLATION (H): Primary | ICD-10-CM

## 2021-01-01 DIAGNOSIS — D84.9 IMMUNOSUPPRESSED STATUS (H): ICD-10-CM

## 2021-01-01 DIAGNOSIS — Z11.52 ENCOUNTER FOR SCREENING LABORATORY TESTING FOR SEVERE ACUTE RESPIRATORY SYNDROME CORONAVIRUS 2 (SARS-COV-2): ICD-10-CM

## 2021-01-01 DIAGNOSIS — R00.2 PALPITATIONS: ICD-10-CM

## 2021-01-01 DIAGNOSIS — R53.1 GENERALIZED WEAKNESS: ICD-10-CM

## 2021-01-01 DIAGNOSIS — N13.30 HYDRONEPHROSIS, UNSPECIFIED HYDRONEPHROSIS TYPE: ICD-10-CM

## 2021-01-01 DIAGNOSIS — R07.89 PRESSURE IN CHEST: Primary | ICD-10-CM

## 2021-01-01 DIAGNOSIS — Z11.59 ENCOUNTER FOR SCREENING FOR OTHER VIRAL DISEASES: ICD-10-CM

## 2021-01-01 DIAGNOSIS — Z94.0 KIDNEY TRANSPLANT RECIPIENT: ICD-10-CM

## 2021-01-01 DIAGNOSIS — I47.10 PAROXYSMAL SUPRAVENTRICULAR TACHYCARDIA (H): ICD-10-CM

## 2021-01-01 DIAGNOSIS — R94.31 HOLTER MONITOR, ABNORMAL: Primary | ICD-10-CM

## 2021-01-01 DIAGNOSIS — I10 ESSENTIAL HYPERTENSION: Primary | ICD-10-CM

## 2021-01-01 DIAGNOSIS — I48.91 ATRIAL FIBRILLATION WITH RVR (H): ICD-10-CM

## 2021-01-01 DIAGNOSIS — N10 ACUTE PYELONEPHRITIS: Primary | ICD-10-CM

## 2021-01-01 DIAGNOSIS — I48.91 ATRIAL FIBRILLATION WITH RVR (H): Primary | ICD-10-CM

## 2021-01-01 DIAGNOSIS — R06.09 DOE (DYSPNEA ON EXERTION): ICD-10-CM

## 2021-01-01 DIAGNOSIS — R06.09 DYSPNEA ON EXERTION: ICD-10-CM

## 2021-01-01 DIAGNOSIS — R06.00 DYSPNEA, UNSPECIFIED TYPE: Primary | ICD-10-CM

## 2021-01-01 DIAGNOSIS — R63.4 WEIGHT LOSS: ICD-10-CM

## 2021-01-01 DIAGNOSIS — R11.0 NAUSEA: ICD-10-CM

## 2021-01-01 DIAGNOSIS — Z94.0 S/P KIDNEY TRANSPLANT: Primary | ICD-10-CM

## 2021-01-01 DIAGNOSIS — M62.81 GENERALIZED MUSCLE WEAKNESS: ICD-10-CM

## 2021-01-01 DIAGNOSIS — N12 PYELONEPHRITIS: ICD-10-CM

## 2021-01-01 DIAGNOSIS — Z71.89 OTHER SPECIFIED COUNSELING: ICD-10-CM

## 2021-01-01 DIAGNOSIS — Z53.9 DIAGNOSIS NOT YET DEFINED: Primary | ICD-10-CM

## 2021-01-01 DIAGNOSIS — R42 LIGHTHEADEDNESS: ICD-10-CM

## 2021-01-01 DIAGNOSIS — R07.89 PRESSURE IN CHEST: ICD-10-CM

## 2021-01-01 LAB
ALBUMIN SERPL-MCNC: 2.6 G/DL (ref 3.4–5)
ALBUMIN SERPL-MCNC: 2.7 G/DL (ref 3.4–5)
ALBUMIN SERPL-MCNC: 3.1 G/DL (ref 3.4–5)
ALBUMIN SERPL-MCNC: 3.2 G/DL (ref 3.4–5)
ALBUMIN SERPL-MCNC: 3.3 G/DL (ref 3.4–5)
ALBUMIN UR-MCNC: 20 MG/DL
ALBUMIN UR-MCNC: 30 MG/DL
ALBUMIN UR-MCNC: ABNORMAL MG/DL
ALBUMIN UR-MCNC: NEGATIVE MG/DL
ALP SERPL-CCNC: 116 U/L (ref 40–150)
ALP SERPL-CCNC: 137 U/L (ref 40–150)
ALP SERPL-CCNC: 189 U/L (ref 40–150)
ALP SERPL-CCNC: 190 U/L (ref 40–150)
ALP SERPL-CCNC: 207 U/L (ref 40–150)
ALT SERPL W P-5'-P-CCNC: 20 U/L (ref 0–50)
ALT SERPL W P-5'-P-CCNC: 23 U/L (ref 0–50)
ALT SERPL W P-5'-P-CCNC: 28 U/L (ref 0–50)
ALT SERPL W P-5'-P-CCNC: 33 U/L (ref 0–50)
ALT SERPL W P-5'-P-CCNC: 34 U/L (ref 0–50)
ANION GAP SERPL CALCULATED.3IONS-SCNC: 5 MMOL/L (ref 3–14)
ANION GAP SERPL CALCULATED.3IONS-SCNC: 6 MMOL/L (ref 3–14)
ANION GAP SERPL CALCULATED.3IONS-SCNC: 7 MMOL/L (ref 3–14)
ANION GAP SERPL CALCULATED.3IONS-SCNC: 8 MMOL/L (ref 3–14)
ANION GAP SERPL CALCULATED.3IONS-SCNC: 9 MMOL/L (ref 3–14)
APPEARANCE UR: ABNORMAL
APPEARANCE UR: ABNORMAL
APPEARANCE UR: CLEAR
APPEARANCE UR: CLEAR
APTT PPP: 30 SECONDS (ref 22–38)
AST SERPL W P-5'-P-CCNC: 20 U/L (ref 0–45)
AST SERPL W P-5'-P-CCNC: 26 U/L (ref 0–45)
AST SERPL W P-5'-P-CCNC: 31 U/L (ref 0–45)
AST SERPL W P-5'-P-CCNC: 31 U/L (ref 0–45)
AST SERPL W P-5'-P-CCNC: 39 U/L (ref 0–45)
ATRIAL RATE - MUSE: 288 BPM
ATRIAL RATE - MUSE: 357 BPM
ATRIAL RATE - MUSE: 70 BPM
ATRIAL RATE - MUSE: 87 BPM
BACTERIA #/AREA URNS HPF: ABNORMAL /HPF
BACTERIA UR CULT: NORMAL
BASOPHILS # BLD AUTO: 0.1 10E3/UL (ref 0–0.2)
BASOPHILS # BLD AUTO: 0.1 10E3/UL (ref 0–0.2)
BASOPHILS # BLD MANUAL: 0 10E3/UL (ref 0–0.2)
BASOPHILS NFR BLD AUTO: 1 %
BASOPHILS NFR BLD AUTO: 1 %
BASOPHILS NFR BLD MANUAL: 0 %
BILIRUB SERPL-MCNC: 0.4 MG/DL (ref 0.2–1.3)
BILIRUB SERPL-MCNC: 0.4 MG/DL (ref 0.2–1.3)
BILIRUB SERPL-MCNC: 0.5 MG/DL (ref 0.2–1.3)
BILIRUB SERPL-MCNC: 0.6 MG/DL (ref 0.2–1.3)
BILIRUB SERPL-MCNC: 0.6 MG/DL (ref 0.2–1.3)
BILIRUB UR QL STRIP: NEGATIVE
BUN SERPL-MCNC: 18 MG/DL (ref 7–30)
BUN SERPL-MCNC: 20 MG/DL (ref 7–30)
BUN SERPL-MCNC: 21 MG/DL (ref 7–30)
BUN SERPL-MCNC: 26 MG/DL (ref 7–30)
BUN SERPL-MCNC: 29 MG/DL (ref 7–30)
BUN SERPL-MCNC: 29 MG/DL (ref 7–30)
CALCIUM SERPL-MCNC: 8.7 MG/DL (ref 8.5–10.1)
CALCIUM SERPL-MCNC: 9 MG/DL (ref 8.5–10.1)
CALCIUM SERPL-MCNC: 9.1 MG/DL (ref 8.5–10.1)
CALCIUM SERPL-MCNC: 9.5 MG/DL (ref 8.5–10.1)
CALCIUM SERPL-MCNC: 9.8 MG/DL (ref 8.5–10.1)
CALCIUM SERPL-MCNC: 9.9 MG/DL (ref 8.5–10.1)
CHLORIDE BLD-SCNC: 100 MMOL/L (ref 94–109)
CHLORIDE BLD-SCNC: 100 MMOL/L (ref 94–109)
CHLORIDE BLD-SCNC: 104 MMOL/L (ref 94–109)
CHLORIDE BLD-SCNC: 104 MMOL/L (ref 94–109)
CHLORIDE BLD-SCNC: 96 MMOL/L (ref 94–109)
CHLORIDE BLD-SCNC: 96 MMOL/L (ref 94–109)
CHLORIDE BLD-SCNC: 98 MMOL/L (ref 94–109)
CHLORIDE BLD-SCNC: 99 MMOL/L (ref 94–109)
CO2 SERPL-SCNC: 24 MMOL/L (ref 20–32)
CO2 SERPL-SCNC: 25 MMOL/L (ref 20–32)
CO2 SERPL-SCNC: 25 MMOL/L (ref 20–32)
CO2 SERPL-SCNC: 26 MMOL/L (ref 20–32)
CO2 SERPL-SCNC: 27 MMOL/L (ref 20–32)
CO2 SERPL-SCNC: 28 MMOL/L (ref 20–32)
CO2 SERPL-SCNC: 28 MMOL/L (ref 20–32)
CO2 SERPL-SCNC: 29 MMOL/L (ref 20–32)
COLOR UR AUTO: ABNORMAL
COLOR UR AUTO: ABNORMAL
COLOR UR AUTO: YELLOW
COLOR UR AUTO: YELLOW
CREAT SERPL-MCNC: 1.38 MG/DL (ref 0.52–1.04)
CREAT SERPL-MCNC: 1.42 MG/DL (ref 0.52–1.04)
CREAT SERPL-MCNC: 1.46 MG/DL (ref 0.52–1.04)
CREAT SERPL-MCNC: 1.51 MG/DL (ref 0.52–1.04)
CREAT SERPL-MCNC: 1.52 MG/DL (ref 0.52–1.04)
CREAT SERPL-MCNC: 1.57 MG/DL (ref 0.52–1.04)
CREAT SERPL-MCNC: 1.67 MG/DL (ref 0.52–1.04)
CREAT SERPL-MCNC: 1.69 MG/DL (ref 0.52–1.04)
CYCLOSPORINE BLD LC/MS/MS-MCNC: 39 UG/L (ref 50–400)
CYCLOSPORINE BLD LC/MS/MS-MCNC: 41 UG/L (ref 50–400)
DIASTOLIC BLOOD PRESSURE - MUSE: NORMAL MMHG
EOSINOPHIL # BLD AUTO: 0.2 10E3/UL (ref 0–0.7)
EOSINOPHIL # BLD AUTO: 0.3 10E3/UL (ref 0–0.7)
EOSINOPHIL # BLD MANUAL: 0.1 10E3/UL (ref 0–0.7)
EOSINOPHIL NFR BLD AUTO: 3 %
EOSINOPHIL NFR BLD AUTO: 3 %
EOSINOPHIL NFR BLD MANUAL: 1 %
ERYTHROCYTE [DISTWIDTH] IN BLOOD BY AUTOMATED COUNT: 14.6 % (ref 10–15)
ERYTHROCYTE [DISTWIDTH] IN BLOOD BY AUTOMATED COUNT: 14.6 % (ref 10–15)
ERYTHROCYTE [DISTWIDTH] IN BLOOD BY AUTOMATED COUNT: 14.7 % (ref 10–15)
ERYTHROCYTE [DISTWIDTH] IN BLOOD BY AUTOMATED COUNT: 14.9 % (ref 10–15)
ERYTHROCYTE [DISTWIDTH] IN BLOOD BY AUTOMATED COUNT: 15.2 % (ref 10–15)
ERYTHROCYTE [DISTWIDTH] IN BLOOD BY AUTOMATED COUNT: 15.4 % (ref 10–15)
ERYTHROCYTE [DISTWIDTH] IN BLOOD BY AUTOMATED COUNT: 15.6 % (ref 10–15)
GFR SERPL CREATININE-BSD FRML MDRD: 27 ML/MIN/1.73M2
GFR SERPL CREATININE-BSD FRML MDRD: 28 ML/MIN/1.73M2
GFR SERPL CREATININE-BSD FRML MDRD: 30 ML/MIN/1.73M2
GFR SERPL CREATININE-BSD FRML MDRD: 31 ML/MIN/1.73M2
GFR SERPL CREATININE-BSD FRML MDRD: 32 ML/MIN/1.73M2
GFR SERPL CREATININE-BSD FRML MDRD: 33 ML/MIN/1.73M2
GFR SERPL CREATININE-BSD FRML MDRD: 34 ML/MIN/1.73M2
GFR SERPL CREATININE-BSD FRML MDRD: 35 ML/MIN/1.73M2
GLUCOSE BLD-MCNC: 109 MG/DL (ref 70–99)
GLUCOSE BLD-MCNC: 121 MG/DL (ref 70–99)
GLUCOSE BLD-MCNC: 121 MG/DL (ref 70–99)
GLUCOSE BLD-MCNC: 141 MG/DL (ref 70–99)
GLUCOSE BLD-MCNC: 91 MG/DL (ref 70–99)
GLUCOSE BLD-MCNC: 93 MG/DL (ref 70–99)
GLUCOSE BLD-MCNC: 95 MG/DL (ref 70–99)
GLUCOSE BLD-MCNC: 95 MG/DL (ref 70–99)
GLUCOSE UR STRIP-MCNC: NEGATIVE MG/DL
HCT VFR BLD AUTO: 35.9 % (ref 35–47)
HCT VFR BLD AUTO: 36.7 % (ref 35–47)
HCT VFR BLD AUTO: 37.7 % (ref 35–47)
HCT VFR BLD AUTO: 37.9 % (ref 35–47)
HCT VFR BLD AUTO: 38.8 % (ref 35–47)
HCT VFR BLD AUTO: 39.2 % (ref 35–47)
HCT VFR BLD AUTO: 39.8 % (ref 35–47)
HGB BLD-MCNC: 11.7 G/DL (ref 11.7–15.7)
HGB BLD-MCNC: 11.9 G/DL (ref 11.7–15.7)
HGB BLD-MCNC: 12 G/DL (ref 11.7–15.7)
HGB BLD-MCNC: 12.1 G/DL (ref 11.7–15.7)
HGB BLD-MCNC: 12.3 G/DL (ref 11.7–15.7)
HGB BLD-MCNC: 12.5 G/DL (ref 11.7–15.7)
HGB BLD-MCNC: 12.6 G/DL (ref 11.7–15.7)
HGB UR QL STRIP: ABNORMAL
HGB UR QL STRIP: ABNORMAL
HGB UR QL STRIP: NEGATIVE
HGB UR QL STRIP: NEGATIVE
HOLD SPECIMEN: NORMAL
HYALINE CASTS #/AREA URNS LPF: ABNORMAL /LPF
IMM GRANULOCYTES # BLD: 0 10E3/UL
IMM GRANULOCYTES # BLD: 0 10E3/UL
IMM GRANULOCYTES NFR BLD: 0 %
IMM GRANULOCYTES NFR BLD: 0 %
INR PPP: 0.99 (ref 0.85–1.15)
INR PPP: 1.18 (ref 0.85–1.15)
INTERPRETATION ECG - MUSE: NORMAL
KETONES UR STRIP-MCNC: ABNORMAL MG/DL
KETONES UR STRIP-MCNC: NEGATIVE MG/DL
LACTATE SERPL-SCNC: 1.2 MMOL/L (ref 0.7–2)
LEUKOCYTE ESTERASE UR QL STRIP: ABNORMAL
LEUKOCYTE ESTERASE UR QL STRIP: NEGATIVE
LVEF ECHO: NORMAL
LYMPHOCYTES # BLD AUTO: 0.8 10E3/UL (ref 0.8–5.3)
LYMPHOCYTES # BLD AUTO: 1.1 10E3/UL (ref 0.8–5.3)
LYMPHOCYTES # BLD MANUAL: 1 10E3/UL (ref 0.8–5.3)
LYMPHOCYTES NFR BLD AUTO: 11 %
LYMPHOCYTES NFR BLD AUTO: 7 %
LYMPHOCYTES NFR BLD MANUAL: 12 %
MAGNESIUM SERPL-MCNC: 2 MG/DL (ref 1.6–2.3)
MAGNESIUM SERPL-MCNC: 2 MG/DL (ref 1.6–2.3)
MCH RBC QN AUTO: 29.5 PG (ref 26.5–33)
MCH RBC QN AUTO: 29.8 PG (ref 26.5–33)
MCH RBC QN AUTO: 29.9 PG (ref 26.5–33)
MCH RBC QN AUTO: 30 PG (ref 26.5–33)
MCH RBC QN AUTO: 30 PG (ref 26.5–33)
MCH RBC QN AUTO: 30.1 PG (ref 26.5–33)
MCH RBC QN AUTO: 30.3 PG (ref 26.5–33)
MCHC RBC AUTO-ENTMCNC: 30.9 G/DL (ref 31.5–36.5)
MCHC RBC AUTO-ENTMCNC: 31.4 G/DL (ref 31.5–36.5)
MCHC RBC AUTO-ENTMCNC: 32.1 G/DL (ref 31.5–36.5)
MCHC RBC AUTO-ENTMCNC: 32.1 G/DL (ref 31.5–36.5)
MCHC RBC AUTO-ENTMCNC: 32.2 G/DL (ref 31.5–36.5)
MCHC RBC AUTO-ENTMCNC: 32.6 G/DL (ref 31.5–36.5)
MCHC RBC AUTO-ENTMCNC: 32.7 G/DL (ref 31.5–36.5)
MCV RBC AUTO: 92 FL (ref 78–100)
MCV RBC AUTO: 92 FL (ref 78–100)
MCV RBC AUTO: 93 FL (ref 78–100)
MCV RBC AUTO: 94 FL (ref 78–100)
MCV RBC AUTO: 97 FL (ref 78–100)
MONOCYTES # BLD AUTO: 1.2 10E3/UL (ref 0–1.3)
MONOCYTES # BLD AUTO: 1.7 10E3/UL (ref 0–1.3)
MONOCYTES # BLD MANUAL: 0.7 10E3/UL (ref 0–1.3)
MONOCYTES NFR BLD AUTO: 12 %
MONOCYTES NFR BLD AUTO: 15 %
MONOCYTES NFR BLD MANUAL: 8 %
MUCOUS THREADS #/AREA URNS LPF: PRESENT /LPF
NEUTROPHILS # BLD AUTO: 7.1 10E3/UL (ref 1.6–8.3)
NEUTROPHILS # BLD AUTO: 8.1 10E3/UL (ref 1.6–8.3)
NEUTROPHILS # BLD MANUAL: 6.6 10E3/UL (ref 1.6–8.3)
NEUTROPHILS NFR BLD AUTO: 73 %
NEUTROPHILS NFR BLD AUTO: 74 %
NEUTROPHILS NFR BLD MANUAL: 79 %
NITRATE UR QL: NEGATIVE
NITRATE UR QL: POSITIVE
NRBC # BLD AUTO: 0 10E3/UL
NRBC # BLD AUTO: 0 10E3/UL
NRBC BLD AUTO-RTO: 0 /100
NRBC BLD AUTO-RTO: 0 /100
NT-PROBNP SERPL-MCNC: 2052 PG/ML (ref 0–1800)
P AXIS - MUSE: 44 DEGREES
P AXIS - MUSE: 45 DEGREES
P AXIS - MUSE: NORMAL DEGREES
P AXIS - MUSE: NORMAL DEGREES
PH UR STRIP: 5.5 [PH] (ref 5–7)
PH UR STRIP: 6 [PH] (ref 5–7)
PH UR STRIP: 6 [PH] (ref 5–7)
PH UR STRIP: 6.5 [PH] (ref 5–7)
PLAT MORPH BLD: NORMAL
PLATELET # BLD AUTO: 229 10E3/UL (ref 150–450)
PLATELET # BLD AUTO: 239 10E3/UL (ref 150–450)
PLATELET # BLD AUTO: 245 10E3/UL (ref 150–450)
PLATELET # BLD AUTO: 332 10E3/UL (ref 150–450)
PLATELET # BLD AUTO: 343 10E3/UL (ref 150–450)
PLATELET # BLD AUTO: 350 10E3/UL (ref 150–450)
PLATELET # BLD AUTO: 365 10E3/UL (ref 150–450)
POTASSIUM BLD-SCNC: 3.5 MMOL/L (ref 3.4–5.3)
POTASSIUM BLD-SCNC: 3.7 MMOL/L (ref 3.4–5.3)
POTASSIUM BLD-SCNC: 3.9 MMOL/L (ref 3.4–5.3)
POTASSIUM BLD-SCNC: 3.9 MMOL/L (ref 3.4–5.3)
POTASSIUM BLD-SCNC: 4.4 MMOL/L (ref 3.4–5.3)
POTASSIUM BLD-SCNC: 4.5 MMOL/L (ref 3.4–5.3)
PR INTERVAL - MUSE: 144 MS
PR INTERVAL - MUSE: 148 MS
PR INTERVAL - MUSE: NORMAL MS
PR INTERVAL - MUSE: NORMAL MS
PROT SERPL-MCNC: 6.6 G/DL (ref 6.8–8.8)
PROT SERPL-MCNC: 6.7 G/DL (ref 6.8–8.8)
PROT SERPL-MCNC: 6.9 G/DL (ref 6.8–8.8)
PROT SERPL-MCNC: 7.2 G/DL (ref 6.8–8.8)
PROT SERPL-MCNC: 7.3 G/DL (ref 6.8–8.8)
QRS DURATION - MUSE: 100 MS
QRS DURATION - MUSE: 86 MS
QRS DURATION - MUSE: 92 MS
QRS DURATION - MUSE: 94 MS
QT - MUSE: 296 MS
QT - MUSE: 384 MS
QT - MUSE: 408 MS
QT - MUSE: 412 MS
QTC - MUSE: 387 MS
QTC - MUSE: 444 MS
QTC - MUSE: 459 MS
QTC - MUSE: 462 MS
R AXIS - MUSE: 19 DEGREES
R AXIS - MUSE: 30 DEGREES
R AXIS - MUSE: 50 DEGREES
R AXIS - MUSE: 55 DEGREES
RBC # BLD AUTO: 3.92 10E6/UL (ref 3.8–5.2)
RBC # BLD AUTO: 4 10E6/UL (ref 3.8–5.2)
RBC # BLD AUTO: 4.02 10E6/UL (ref 3.8–5.2)
RBC # BLD AUTO: 4.03 10E6/UL (ref 3.8–5.2)
RBC # BLD AUTO: 4.12 10E6/UL (ref 3.8–5.2)
RBC # BLD AUTO: 4.16 10E6/UL (ref 3.8–5.2)
RBC # BLD AUTO: 4.17 10E6/UL (ref 3.8–5.2)
RBC #/AREA URNS AUTO: ABNORMAL /HPF
RBC #/AREA URNS AUTO: ABNORMAL /HPF
RBC MORPH BLD: NORMAL
RBC URINE: 1 /HPF
RBC URINE: <1 /HPF
SARS-COV-2 RNA RESP QL NAA+PROBE: NEGATIVE
SODIUM SERPL-SCNC: 130 MMOL/L (ref 133–144)
SODIUM SERPL-SCNC: 131 MMOL/L (ref 133–144)
SODIUM SERPL-SCNC: 131 MMOL/L (ref 133–144)
SODIUM SERPL-SCNC: 132 MMOL/L (ref 133–144)
SODIUM SERPL-SCNC: 133 MMOL/L (ref 133–144)
SODIUM SERPL-SCNC: 135 MMOL/L (ref 133–144)
SODIUM SERPL-SCNC: 136 MMOL/L (ref 133–144)
SODIUM SERPL-SCNC: 137 MMOL/L (ref 133–144)
SP GR UR STRIP: 1 (ref 1–1.03)
SP GR UR STRIP: 1.02 (ref 1–1.03)
SQUAMOUS #/AREA URNS AUTO: ABNORMAL /LPF
SQUAMOUS #/AREA URNS AUTO: ABNORMAL /LPF
SQUAMOUS EPITHELIAL: 1 /HPF
SQUAMOUS EPITHELIAL: 4 /HPF
STRESS ECHO BASELINE DIASTOLIC HE: 89
STRESS ECHO BASELINE HR: 103 BPM
STRESS ECHO BASELINE SYSTOLIC BP: 148
STRESS ECHO TARGET HR: 136
STRESS/REST PERFUSION RATIO: 1.11
SYSTOLIC BLOOD PRESSURE - MUSE: NORMAL MMHG
T AXIS - MUSE: 25 DEGREES
T AXIS - MUSE: 52 DEGREES
T AXIS - MUSE: 55 DEGREES
T AXIS - MUSE: 59 DEGREES
TME LAST DOSE: ABNORMAL H
TROPONIN I SERPL-MCNC: <0.015 UG/L (ref 0–0.04)
TROPONIN I SERPL-MCNC: <0.015 UG/L (ref 0–0.04)
TSH SERPL DL<=0.005 MIU/L-ACNC: 1.36 MU/L (ref 0.4–4)
TSH SERPL DL<=0.005 MIU/L-ACNC: 2.2 MU/L (ref 0.4–4)
UFH PPP CHRO-ACNC: 0.63 IU/ML
UFH PPP CHRO-ACNC: >1.1 IU/ML
URATE CRY #/AREA URNS HPF: ABNORMAL /HPF
UROBILINOGEN UR STRIP-ACNC: 0.2 E.U./DL
UROBILINOGEN UR STRIP-ACNC: 0.2 E.U./DL
UROBILINOGEN UR STRIP-MCNC: NORMAL MG/DL
UROBILINOGEN UR STRIP-MCNC: NORMAL MG/DL
VENTRICULAR RATE- MUSE: 103 BPM
VENTRICULAR RATE- MUSE: 70 BPM
VENTRICULAR RATE- MUSE: 76 BPM
VENTRICULAR RATE- MUSE: 87 BPM
WBC # BLD AUTO: 10.9 10E3/UL (ref 4–11)
WBC # BLD AUTO: 6.5 10E3/UL (ref 4–11)
WBC # BLD AUTO: 8.1 10E3/UL (ref 4–11)
WBC # BLD AUTO: 8.3 10E3/UL (ref 4–11)
WBC # BLD AUTO: 8.8 10E3/UL (ref 4–11)
WBC # BLD AUTO: 9.6 10E3/UL (ref 4–11)
WBC # BLD AUTO: 9.8 10E3/UL (ref 4–11)
WBC #/AREA URNS AUTO: ABNORMAL /HPF
WBC #/AREA URNS AUTO: ABNORMAL /HPF
WBC URINE: 38 /HPF
WBC URINE: 4 /HPF

## 2021-01-01 PROCEDURE — 87086 URINE CULTURE/COLONY COUNT: CPT | Mod: 59

## 2021-01-01 PROCEDURE — 258N000003 HC RX IP 258 OP 636: Performed by: INTERNAL MEDICINE

## 2021-01-01 PROCEDURE — 250N000013 HC RX MED GY IP 250 OP 250 PS 637: Performed by: INTERNAL MEDICINE

## 2021-01-01 PROCEDURE — 36415 COLL VENOUS BLD VENIPUNCTURE: CPT | Performed by: STUDENT IN AN ORGANIZED HEALTH CARE EDUCATION/TRAINING PROGRAM

## 2021-01-01 PROCEDURE — 250N000012 HC RX MED GY IP 250 OP 636 PS 637: Performed by: INTERNAL MEDICINE

## 2021-01-01 PROCEDURE — 85025 COMPLETE CBC W/AUTO DIFF WBC: CPT | Performed by: EMERGENCY MEDICINE

## 2021-01-01 PROCEDURE — 93005 ELECTROCARDIOGRAM TRACING: CPT

## 2021-01-01 PROCEDURE — 83880 ASSAY OF NATRIURETIC PEPTIDE: CPT | Performed by: EMERGENCY MEDICINE

## 2021-01-01 PROCEDURE — 99217 PR OBSERVATION CARE DISCHARGE: CPT | Performed by: HOSPITALIST

## 2021-01-01 PROCEDURE — 99233 SBSQ HOSP IP/OBS HIGH 50: CPT | Performed by: INTERNAL MEDICINE

## 2021-01-01 PROCEDURE — 76776 US EXAM K TRANSPL W/DOPPLER: CPT

## 2021-01-01 PROCEDURE — G0378 HOSPITAL OBSERVATION PER HR: HCPCS

## 2021-01-01 PROCEDURE — 84443 ASSAY THYROID STIM HORMONE: CPT | Performed by: EMERGENCY MEDICINE

## 2021-01-01 PROCEDURE — 71046 X-RAY EXAM CHEST 2 VIEWS: CPT

## 2021-01-01 PROCEDURE — 96375 TX/PRO/DX INJ NEW DRUG ADDON: CPT

## 2021-01-01 PROCEDURE — 250N000011 HC RX IP 250 OP 636: Performed by: STUDENT IN AN ORGANIZED HEALTH CARE EDUCATION/TRAINING PROGRAM

## 2021-01-01 PROCEDURE — 99207 PR CDG-HISTORY COMPONENT: MEETS DETAILED - DOWN CODED LACK OF ROS: CPT | Performed by: HOSPITALIST

## 2021-01-01 PROCEDURE — 255N000002 HC RX 255 OP 636: Performed by: INTERNAL MEDICINE

## 2021-01-01 PROCEDURE — 96366 THER/PROPH/DIAG IV INF ADDON: CPT

## 2021-01-01 PROCEDURE — 99207 PR APP CREDIT; MD BILLING SHARED VISIT: CPT | Performed by: HOSPITALIST

## 2021-01-01 PROCEDURE — 120N000002 HC R&B MED SURG/OB UMMC

## 2021-01-01 PROCEDURE — 82040 ASSAY OF SERUM ALBUMIN: CPT | Performed by: EMERGENCY MEDICINE

## 2021-01-01 PROCEDURE — 250N000013 HC RX MED GY IP 250 OP 250 PS 637: Performed by: STUDENT IN AN ORGANIZED HEALTH CARE EDUCATION/TRAINING PROGRAM

## 2021-01-01 PROCEDURE — 78452 HT MUSCLE IMAGE SPECT MULT: CPT

## 2021-01-01 PROCEDURE — C9803 HOPD COVID-19 SPEC COLLECT: HCPCS

## 2021-01-01 PROCEDURE — 78452 HT MUSCLE IMAGE SPECT MULT: CPT | Mod: 26 | Performed by: INTERNAL MEDICINE

## 2021-01-01 PROCEDURE — 85610 PROTHROMBIN TIME: CPT | Performed by: EMERGENCY MEDICINE

## 2021-01-01 PROCEDURE — 93016 CV STRESS TEST SUPVJ ONLY: CPT | Performed by: INTERNAL MEDICINE

## 2021-01-01 PROCEDURE — 81001 URINALYSIS AUTO W/SCOPE: CPT | Performed by: EMERGENCY MEDICINE

## 2021-01-01 PROCEDURE — 96374 THER/PROPH/DIAG INJ IV PUSH: CPT

## 2021-01-01 PROCEDURE — 99221 1ST HOSP IP/OBS SF/LOW 40: CPT | Mod: GC | Performed by: HOSPITALIST

## 2021-01-01 PROCEDURE — 83735 ASSAY OF MAGNESIUM: CPT | Performed by: STUDENT IN AN ORGANIZED HEALTH CARE EDUCATION/TRAINING PROGRAM

## 2021-01-01 PROCEDURE — 250N000013 HC RX MED GY IP 250 OP 250 PS 637

## 2021-01-01 PROCEDURE — 36415 COLL VENOUS BLD VENIPUNCTURE: CPT | Performed by: HOSPITALIST

## 2021-01-01 PROCEDURE — 93017 CV STRESS TEST TRACING ONLY: CPT

## 2021-01-01 PROCEDURE — 84484 ASSAY OF TROPONIN QUANT: CPT | Performed by: EMERGENCY MEDICINE

## 2021-01-01 PROCEDURE — 99207 PR CDG-CODE CATEGORY CHANGED: CPT | Performed by: HOSPITALIST

## 2021-01-01 PROCEDURE — 96361 HYDRATE IV INFUSION ADD-ON: CPT

## 2021-01-01 PROCEDURE — 85027 COMPLETE CBC AUTOMATED: CPT | Performed by: INTERNAL MEDICINE

## 2021-01-01 PROCEDURE — 343N000001 HC RX 343: Performed by: PHYSICIAN ASSISTANT

## 2021-01-01 PROCEDURE — 93306 TTE W/DOPPLER COMPLETE: CPT | Mod: 26 | Performed by: INTERNAL MEDICINE

## 2021-01-01 PROCEDURE — 99223 1ST HOSP IP/OBS HIGH 75: CPT | Mod: GC | Performed by: INTERNAL MEDICINE

## 2021-01-01 PROCEDURE — 36415 COLL VENOUS BLD VENIPUNCTURE: CPT | Performed by: EMERGENCY MEDICINE

## 2021-01-01 PROCEDURE — 99233 SBSQ HOSP IP/OBS HIGH 50: CPT | Mod: GC | Performed by: INTERNAL MEDICINE

## 2021-01-01 PROCEDURE — 85520 HEPARIN ASSAY: CPT | Performed by: INTERNAL MEDICINE

## 2021-01-01 PROCEDURE — 80158 DRUG ASSAY CYCLOSPORINE: CPT

## 2021-01-01 PROCEDURE — 93242 EXT ECG>48HR<7D RECORDING: CPT

## 2021-01-01 PROCEDURE — 36415 COLL VENOUS BLD VENIPUNCTURE: CPT | Performed by: INTERNAL MEDICINE

## 2021-01-01 PROCEDURE — 87086 URINE CULTURE/COLONY COUNT: CPT | Performed by: EMERGENCY MEDICINE

## 2021-01-01 PROCEDURE — 999N000111 HC STATISTIC OT IP EVAL DEFER

## 2021-01-01 PROCEDURE — 83605 ASSAY OF LACTIC ACID: CPT | Performed by: EMERGENCY MEDICINE

## 2021-01-01 PROCEDURE — 250N000012 HC RX MED GY IP 250 OP 636 PS 637: Performed by: STUDENT IN AN ORGANIZED HEALTH CARE EDUCATION/TRAINING PROGRAM

## 2021-01-01 PROCEDURE — 87635 SARS-COV-2 COVID-19 AMP PRB: CPT | Performed by: EMERGENCY MEDICINE

## 2021-01-01 PROCEDURE — 96376 TX/PRO/DX INJ SAME DRUG ADON: CPT

## 2021-01-01 PROCEDURE — 85027 COMPLETE CBC AUTOMATED: CPT | Performed by: STUDENT IN AN ORGANIZED HEALTH CARE EDUCATION/TRAINING PROGRAM

## 2021-01-01 PROCEDURE — 99285 EMERGENCY DEPT VISIT HI MDM: CPT | Mod: 25

## 2021-01-01 PROCEDURE — 258N000003 HC RX IP 258 OP 636: Performed by: EMERGENCY MEDICINE

## 2021-01-01 PROCEDURE — 36415 COLL VENOUS BLD VENIPUNCTURE: CPT

## 2021-01-01 PROCEDURE — 74176 CT ABD & PELVIS W/O CONTRAST: CPT

## 2021-01-01 PROCEDURE — 99225 PR SUBSEQUENT OBSERVATION CARE,LEVEL II: CPT | Performed by: HOSPITALIST

## 2021-01-01 PROCEDURE — 81001 URINALYSIS AUTO W/SCOPE: CPT

## 2021-01-01 PROCEDURE — 999N000208 ECHOCARDIOGRAM COMPLETE

## 2021-01-01 PROCEDURE — 96368 THER/DIAG CONCURRENT INF: CPT

## 2021-01-01 PROCEDURE — 81001 URINALYSIS AUTO W/SCOPE: CPT | Performed by: INTERNAL MEDICINE

## 2021-01-01 PROCEDURE — U0005 INFEC AGEN DETEC AMPLI PROBE: HCPCS

## 2021-01-01 PROCEDURE — 250N000011 HC RX IP 250 OP 636: Performed by: EMERGENCY MEDICINE

## 2021-01-01 PROCEDURE — 99207 PR NON-BILLABLE SERV PER CHARTING: CPT | Performed by: PHYSICIAN ASSISTANT

## 2021-01-01 PROCEDURE — 258N000003 HC RX IP 258 OP 636: Performed by: STUDENT IN AN ORGANIZED HEALTH CARE EDUCATION/TRAINING PROGRAM

## 2021-01-01 PROCEDURE — 85027 COMPLETE CBC AUTOMATED: CPT | Performed by: EMERGENCY MEDICINE

## 2021-01-01 PROCEDURE — 99220 PR INITIAL OBSERVATION CARE,LEVEL III: CPT | Performed by: INTERNAL MEDICINE

## 2021-01-01 PROCEDURE — 82040 ASSAY OF SERUM ALBUMIN: CPT | Performed by: STUDENT IN AN ORGANIZED HEALTH CARE EDUCATION/TRAINING PROGRAM

## 2021-01-01 PROCEDURE — 93018 CV STRESS TEST I&R ONLY: CPT | Performed by: INTERNAL MEDICINE

## 2021-01-01 PROCEDURE — 97162 PT EVAL MOD COMPLEX 30 MIN: CPT | Mod: GP | Performed by: PHYSICAL THERAPIST

## 2021-01-01 PROCEDURE — A9502 TC99M TETROFOSMIN: HCPCS | Performed by: PHYSICIAN ASSISTANT

## 2021-01-01 PROCEDURE — 87086 URINE CULTURE/COLONY COUNT: CPT | Performed by: FAMILY MEDICINE

## 2021-01-01 PROCEDURE — 96365 THER/PROPH/DIAG IV INF INIT: CPT | Mod: 59

## 2021-01-01 PROCEDURE — 250N000011 HC RX IP 250 OP 636: Performed by: INTERNAL MEDICINE

## 2021-01-01 PROCEDURE — 99214 OFFICE O/P EST MOD 30 MIN: CPT | Performed by: NURSE PRACTITIONER

## 2021-01-01 PROCEDURE — 80053 COMPREHEN METABOLIC PANEL: CPT | Performed by: STUDENT IN AN ORGANIZED HEALTH CARE EDUCATION/TRAINING PROGRAM

## 2021-01-01 PROCEDURE — 76776 US EXAM K TRANSPL W/DOPPLER: CPT | Mod: 26 | Performed by: RADIOLOGY

## 2021-01-01 PROCEDURE — 80158 DRUG ASSAY CYCLOSPORINE: CPT | Performed by: STUDENT IN AN ORGANIZED HEALTH CARE EDUCATION/TRAINING PROGRAM

## 2021-01-01 PROCEDURE — 999N000111 HC STATISTIC OT IP EVAL DEFER: Performed by: REHABILITATION PRACTITIONER

## 2021-01-01 PROCEDURE — 80048 BASIC METABOLIC PNL TOTAL CA: CPT | Performed by: INTERNAL MEDICINE

## 2021-01-01 PROCEDURE — 96360 HYDRATION IV INFUSION INIT: CPT

## 2021-01-01 PROCEDURE — 96365 THER/PROPH/DIAG IV INF INIT: CPT

## 2021-01-01 PROCEDURE — U0003 INFECTIOUS AGENT DETECTION BY NUCLEIC ACID (DNA OR RNA); SEVERE ACUTE RESPIRATORY SYNDROME CORONAVIRUS 2 (SARS-COV-2) (CORONAVIRUS DISEASE [COVID-19]), AMPLIFIED PROBE TECHNIQUE, MAKING USE OF HIGH THROUGHPUT TECHNOLOGIES AS DESCRIBED BY CMS-2020-01-R: HCPCS | Performed by: EMERGENCY MEDICINE

## 2021-01-01 PROCEDURE — 99495 TRANSJ CARE MGMT MOD F2F 14D: CPT | Performed by: INTERNAL MEDICINE

## 2021-01-01 PROCEDURE — 97530 THERAPEUTIC ACTIVITIES: CPT | Mod: GP | Performed by: PHYSICAL THERAPIST

## 2021-01-01 PROCEDURE — 99215 OFFICE O/P EST HI 40 MIN: CPT | Performed by: PHYSICIAN ASSISTANT

## 2021-01-01 PROCEDURE — G0180 MD CERTIFICATION HHA PATIENT: HCPCS | Performed by: INTERNAL MEDICINE

## 2021-01-01 PROCEDURE — 250N000011 HC RX IP 250 OP 636

## 2021-01-01 PROCEDURE — 85730 THROMBOPLASTIN TIME PARTIAL: CPT | Performed by: STUDENT IN AN ORGANIZED HEALTH CARE EDUCATION/TRAINING PROGRAM

## 2021-01-01 PROCEDURE — 93000 ELECTROCARDIOGRAM COMPLETE: CPT | Performed by: PHYSICIAN ASSISTANT

## 2021-01-01 PROCEDURE — 99213 OFFICE O/P EST LOW 20 MIN: CPT | Performed by: INTERNAL MEDICINE

## 2021-01-01 PROCEDURE — 250N000009 HC RX 250: Performed by: EMERGENCY MEDICINE

## 2021-01-01 PROCEDURE — 99204 OFFICE O/P NEW MOD 45 MIN: CPT | Performed by: INTERNAL MEDICINE

## 2021-01-01 PROCEDURE — 99283 EMERGENCY DEPT VISIT LOW MDM: CPT | Performed by: EMERGENCY MEDICINE

## 2021-01-01 PROCEDURE — 99239 HOSP IP/OBS DSCHRG MGMT >30: CPT | Performed by: INTERNAL MEDICINE

## 2021-01-01 PROCEDURE — 85520 HEPARIN ASSAY: CPT | Mod: 91 | Performed by: HOSPITALIST

## 2021-01-01 PROCEDURE — 93244 EXT ECG>48HR<7D REV&INTERPJ: CPT | Performed by: INTERNAL MEDICINE

## 2021-01-01 PROCEDURE — 99214 OFFICE O/P EST MOD 30 MIN: CPT | Performed by: FAMILY MEDICINE

## 2021-01-01 PROCEDURE — 81001 URINALYSIS AUTO W/SCOPE: CPT | Performed by: FAMILY MEDICINE

## 2021-01-01 PROCEDURE — 80053 COMPREHEN METABOLIC PANEL: CPT

## 2021-01-01 PROCEDURE — 99214 OFFICE O/P EST MOD 30 MIN: CPT | Performed by: INTERNAL MEDICINE

## 2021-01-01 PROCEDURE — 85610 PROTHROMBIN TIME: CPT | Performed by: STUDENT IN AN ORGANIZED HEALTH CARE EDUCATION/TRAINING PROGRAM

## 2021-01-01 PROCEDURE — 87086 URINE CULTURE/COLONY COUNT: CPT

## 2021-01-01 PROCEDURE — 83735 ASSAY OF MAGNESIUM: CPT | Performed by: EMERGENCY MEDICINE

## 2021-01-01 PROCEDURE — 250N000013 HC RX MED GY IP 250 OP 250 PS 637: Mod: GY | Performed by: INTERNAL MEDICINE

## 2021-01-01 RX ORDER — METOPROLOL SUCCINATE 50 MG/1
50 TABLET, EXTENDED RELEASE ORAL 2 TIMES DAILY
Qty: 180 TABLET | Refills: 3 | Status: SHIPPED | OUTPATIENT
Start: 2021-01-01 | End: 2021-01-01

## 2021-01-01 RX ORDER — AMLODIPINE BESYLATE 2.5 MG/1
2.5 TABLET ORAL DAILY
Status: DISCONTINUED | OUTPATIENT
Start: 2021-01-01 | End: 2021-01-01

## 2021-01-01 RX ORDER — AZATHIOPRINE 50 MG/1
50 TABLET ORAL DAILY
Status: DISCONTINUED | OUTPATIENT
Start: 2021-01-01 | End: 2021-01-01 | Stop reason: HOSPADM

## 2021-01-01 RX ORDER — ONDANSETRON 4 MG/1
4 TABLET, ORALLY DISINTEGRATING ORAL EVERY 6 HOURS PRN
Status: DISCONTINUED | OUTPATIENT
Start: 2021-01-01 | End: 2021-01-01 | Stop reason: HOSPADM

## 2021-01-01 RX ORDER — ASPIRIN 81 MG/1
81 TABLET ORAL DAILY
Status: DISCONTINUED | OUTPATIENT
Start: 2021-01-01 | End: 2021-01-01

## 2021-01-01 RX ORDER — PROCHLORPERAZINE MALEATE 5 MG
5 TABLET ORAL EVERY 6 HOURS PRN
Status: DISCONTINUED | OUTPATIENT
Start: 2021-01-01 | End: 2021-01-01 | Stop reason: HOSPADM

## 2021-01-01 RX ORDER — NITROGLYCERIN 0.4 MG/1
0.4 TABLET SUBLINGUAL EVERY 5 MIN PRN
Status: DISCONTINUED | OUTPATIENT
Start: 2021-01-01 | End: 2021-01-01

## 2021-01-01 RX ORDER — AMOXICILLIN 250 MG
2 CAPSULE ORAL 2 TIMES DAILY PRN
Status: DISCONTINUED | OUTPATIENT
Start: 2021-01-01 | End: 2021-01-01 | Stop reason: HOSPADM

## 2021-01-01 RX ORDER — CIPROFLOXACIN 500 MG/1
500 TABLET, FILM COATED ORAL 2 TIMES DAILY
Qty: 20 TABLET | Refills: 0 | Status: ON HOLD | OUTPATIENT
Start: 2021-01-01 | End: 2021-01-01

## 2021-01-01 RX ORDER — AMLODIPINE BESYLATE 5 MG/1
5 TABLET ORAL 2 TIMES DAILY
Qty: 60 TABLET | Refills: 0 | Status: SHIPPED | OUTPATIENT
Start: 2021-01-01 | End: 2021-01-01

## 2021-01-01 RX ORDER — SODIUM CHLORIDE 9 MG/ML
INJECTION, SOLUTION INTRAVENOUS CONTINUOUS
Status: DISCONTINUED | OUTPATIENT
Start: 2021-01-01 | End: 2021-01-01

## 2021-01-01 RX ORDER — AMLODIPINE BESYLATE 5 MG/1
5 TABLET ORAL 2 TIMES DAILY
Status: DISCONTINUED | OUTPATIENT
Start: 2021-01-01 | End: 2021-01-01 | Stop reason: HOSPADM

## 2021-01-01 RX ORDER — ALBUTEROL SULFATE 90 UG/1
2 AEROSOL, METERED RESPIRATORY (INHALATION) EVERY 5 MIN PRN
Status: DISCONTINUED | OUTPATIENT
Start: 2021-01-01 | End: 2021-01-01 | Stop reason: HOSPADM

## 2021-01-01 RX ORDER — ACETAMINOPHEN 650 MG/1
650 SUPPOSITORY RECTAL EVERY 6 HOURS PRN
Status: DISCONTINUED | OUTPATIENT
Start: 2021-01-01 | End: 2021-01-01 | Stop reason: HOSPADM

## 2021-01-01 RX ORDER — AMLODIPINE BESYLATE 5 MG/1
5 TABLET ORAL DAILY
Qty: 90 TABLET | Refills: 3 | Status: SHIPPED | OUTPATIENT
Start: 2021-01-01 | End: 2021-01-01

## 2021-01-01 RX ORDER — DILTIAZEM HYDROCHLORIDE 5 MG/ML
10 INJECTION INTRAVENOUS ONCE
Status: COMPLETED | OUTPATIENT
Start: 2021-01-01 | End: 2021-01-01

## 2021-01-01 RX ORDER — PROCHLORPERAZINE 25 MG
12.5 SUPPOSITORY, RECTAL RECTAL EVERY 12 HOURS PRN
Status: DISCONTINUED | OUTPATIENT
Start: 2021-01-01 | End: 2021-01-01 | Stop reason: HOSPADM

## 2021-01-01 RX ORDER — LIDOCAINE 40 MG/G
CREAM TOPICAL
Status: DISCONTINUED | OUTPATIENT
Start: 2021-01-01 | End: 2021-01-01 | Stop reason: HOSPADM

## 2021-01-01 RX ORDER — METOPROLOL SUCCINATE 25 MG/1
25 TABLET, EXTENDED RELEASE ORAL 2 TIMES DAILY
Qty: 60 TABLET | Refills: 0 | Status: SHIPPED | OUTPATIENT
Start: 2021-01-01 | End: 2021-01-01

## 2021-01-01 RX ORDER — ACETAMINOPHEN 325 MG/1
650 TABLET ORAL EVERY 6 HOURS PRN
Status: DISCONTINUED | OUTPATIENT
Start: 2021-01-01 | End: 2021-01-01 | Stop reason: HOSPADM

## 2021-01-01 RX ORDER — CYCLOSPORINE 25 MG/1
25 CAPSULE ORAL 2 TIMES DAILY
Status: DISCONTINUED | OUTPATIENT
Start: 2021-01-01 | End: 2021-01-01 | Stop reason: HOSPADM

## 2021-01-01 RX ORDER — CEFTRIAXONE 1 G/1
1 INJECTION, POWDER, FOR SOLUTION INTRAMUSCULAR; INTRAVENOUS EVERY 24 HOURS
Status: DISCONTINUED | OUTPATIENT
Start: 2021-01-01 | End: 2021-01-01 | Stop reason: HOSPADM

## 2021-01-01 RX ORDER — POLYETHYLENE GLYCOL 3350 17 G/17G
17 POWDER, FOR SOLUTION ORAL DAILY PRN
Status: DISCONTINUED | OUTPATIENT
Start: 2021-01-01 | End: 2021-01-01 | Stop reason: HOSPADM

## 2021-01-01 RX ORDER — LIDOCAINE 40 MG/G
CREAM TOPICAL
Status: DISCONTINUED | OUTPATIENT
Start: 2021-01-01 | End: 2021-01-01

## 2021-01-01 RX ORDER — AMLODIPINE BESYLATE 2.5 MG/1
2.5 TABLET ORAL DAILY
Status: DISCONTINUED | OUTPATIENT
Start: 2021-01-01 | End: 2021-01-01 | Stop reason: HOSPADM

## 2021-01-01 RX ORDER — ONDANSETRON 2 MG/ML
4 INJECTION INTRAMUSCULAR; INTRAVENOUS EVERY 6 HOURS PRN
Status: DISCONTINUED | OUTPATIENT
Start: 2021-01-01 | End: 2021-01-01 | Stop reason: HOSPADM

## 2021-01-01 RX ORDER — HYDRALAZINE HYDROCHLORIDE 20 MG/ML
10 INJECTION INTRAMUSCULAR; INTRAVENOUS EVERY 6 HOURS PRN
Status: DISCONTINUED | OUTPATIENT
Start: 2021-01-01 | End: 2021-01-01 | Stop reason: HOSPADM

## 2021-01-01 RX ORDER — ACYCLOVIR 200 MG/1
0-1 CAPSULE ORAL
Status: DISCONTINUED | OUTPATIENT
Start: 2021-01-01 | End: 2021-01-01 | Stop reason: HOSPADM

## 2021-01-01 RX ORDER — METOPROLOL SUCCINATE 25 MG/1
25 TABLET, EXTENDED RELEASE ORAL 2 TIMES DAILY
Status: DISCONTINUED | OUTPATIENT
Start: 2021-01-01 | End: 2021-01-01 | Stop reason: HOSPADM

## 2021-01-01 RX ORDER — NITROGLYCERIN 0.4 MG/1
0.4 TABLET SUBLINGUAL EVERY 5 MIN PRN
Status: DISCONTINUED | OUTPATIENT
Start: 2021-01-01 | End: 2021-01-01 | Stop reason: HOSPADM

## 2021-01-01 RX ORDER — AMOXICILLIN 250 MG
1 CAPSULE ORAL 2 TIMES DAILY PRN
Status: DISCONTINUED | OUTPATIENT
Start: 2021-01-01 | End: 2021-01-01 | Stop reason: HOSPADM

## 2021-01-01 RX ORDER — ACETAMINOPHEN 325 MG/1
650 TABLET ORAL EVERY 4 HOURS PRN
Status: DISCONTINUED | OUTPATIENT
Start: 2021-01-01 | End: 2021-01-01 | Stop reason: HOSPADM

## 2021-01-01 RX ORDER — REGADENOSON 0.08 MG/ML
INJECTION, SOLUTION INTRAVENOUS
Status: COMPLETED
Start: 2021-01-01 | End: 2021-01-01

## 2021-01-01 RX ORDER — ONDANSETRON 2 MG/ML
4 INJECTION INTRAMUSCULAR; INTRAVENOUS EVERY 30 MIN PRN
Status: DISCONTINUED | OUTPATIENT
Start: 2021-01-01 | End: 2021-01-01

## 2021-01-01 RX ORDER — HYDRALAZINE HYDROCHLORIDE 20 MG/ML
10 INJECTION INTRAMUSCULAR; INTRAVENOUS ONCE
Status: DISCONTINUED | OUTPATIENT
Start: 2021-01-01 | End: 2021-01-01 | Stop reason: HOSPADM

## 2021-01-01 RX ORDER — HYDRALAZINE HYDROCHLORIDE 20 MG/ML
10 INJECTION INTRAMUSCULAR; INTRAVENOUS ONCE
Status: COMPLETED | OUTPATIENT
Start: 2021-01-01 | End: 2021-01-01

## 2021-01-01 RX ORDER — HEPARIN SODIUM 5000 [USP'U]/.5ML
5000 INJECTION, SOLUTION INTRAVENOUS; SUBCUTANEOUS EVERY 12 HOURS
Status: DISCONTINUED | OUTPATIENT
Start: 2021-01-01 | End: 2021-01-01 | Stop reason: HOSPADM

## 2021-01-01 RX ORDER — CEFTRIAXONE 1 G/1
1 INJECTION, POWDER, FOR SOLUTION INTRAMUSCULAR; INTRAVENOUS ONCE
Status: COMPLETED | OUTPATIENT
Start: 2021-01-01 | End: 2021-01-01

## 2021-01-01 RX ORDER — DILTIAZEM HCL IN NACL,ISO-OSM 125 MG/125
5-15 PLASTIC BAG, INJECTION (ML) INTRAVENOUS CONTINUOUS
Status: DISCONTINUED | OUTPATIENT
Start: 2021-01-01 | End: 2021-01-01

## 2021-01-01 RX ORDER — AMINOPHYLLINE 25 MG/ML
50-100 INJECTION, SOLUTION INTRAVENOUS
Status: COMPLETED | OUTPATIENT
Start: 2021-01-01 | End: 2021-01-01

## 2021-01-01 RX ORDER — REGADENOSON 0.08 MG/ML
0.4 INJECTION, SOLUTION INTRAVENOUS ONCE
Status: COMPLETED | OUTPATIENT
Start: 2021-01-01 | End: 2021-01-01

## 2021-01-01 RX ORDER — CYCLOSPORINE 25 MG/1
25 CAPSULE ORAL 2 TIMES DAILY
Status: ON HOLD | COMMUNITY
End: 2022-01-01

## 2021-01-01 RX ORDER — AMINOPHYLLINE 25 MG/ML
INJECTION, SOLUTION INTRAVENOUS
Status: COMPLETED
Start: 2021-01-01 | End: 2021-01-01

## 2021-01-01 RX ORDER — ONDANSETRON 4 MG/1
4 TABLET, ORALLY DISINTEGRATING ORAL EVERY 6 HOURS PRN
Qty: 60 TABLET | Refills: 0 | Status: SHIPPED | OUTPATIENT
Start: 2021-01-01 | End: 2021-01-01

## 2021-01-01 RX ORDER — AMLODIPINE BESYLATE 2.5 MG/1
2.5 TABLET ORAL DAILY
Qty: 30 TABLET | Refills: 0 | Status: ON HOLD | OUTPATIENT
Start: 2021-01-01 | End: 2021-01-01

## 2021-01-01 RX ORDER — HEPARIN SODIUM 10000 [USP'U]/100ML
0-5000 INJECTION, SOLUTION INTRAVENOUS CONTINUOUS
Status: DISCONTINUED | OUTPATIENT
Start: 2021-01-01 | End: 2021-01-01

## 2021-01-01 RX ORDER — METOPROLOL SUCCINATE 50 MG/1
50 TABLET, EXTENDED RELEASE ORAL 2 TIMES DAILY
Qty: 180 TABLET | Refills: 0 | Status: SHIPPED | OUTPATIENT
Start: 2021-01-01 | End: 2022-01-01

## 2021-01-01 RX ADMIN — TETROFOSMIN 10.5 MCI.: 1.38 INJECTION, POWDER, LYOPHILIZED, FOR SOLUTION INTRAVENOUS at 13:00

## 2021-01-01 RX ADMIN — METOPROLOL SUCCINATE 25 MG: 25 TABLET, EXTENDED RELEASE ORAL at 14:37

## 2021-01-01 RX ADMIN — METOPROLOL SUCCINATE 25 MG: 25 TABLET, EXTENDED RELEASE ORAL at 20:24

## 2021-01-01 RX ADMIN — HEPARIN SODIUM 5000 UNITS: 10000 INJECTION, SOLUTION INTRAVENOUS; SUBCUTANEOUS at 10:57

## 2021-01-01 RX ADMIN — AZATHIOPRINE 50 MG: 50 TABLET ORAL at 20:12

## 2021-01-01 RX ADMIN — HEPARIN SODIUM 650 UNITS/HR: 10000 INJECTION, SOLUTION INTRAVENOUS at 02:34

## 2021-01-01 RX ADMIN — SODIUM CHLORIDE 1000 ML: 9 INJECTION, SOLUTION INTRAVENOUS at 21:23

## 2021-01-01 RX ADMIN — ACETAMINOPHEN 650 MG: 325 TABLET, FILM COATED ORAL at 01:20

## 2021-01-01 RX ADMIN — CYCLOSPORINE 25 MG: 25 CAPSULE ORAL at 09:55

## 2021-01-01 RX ADMIN — AMLODIPINE BESYLATE 5 MG: 5 TABLET ORAL at 13:24

## 2021-01-01 RX ADMIN — METOPROLOL SUCCINATE 25 MG: 25 TABLET, EXTENDED RELEASE ORAL at 08:57

## 2021-01-01 RX ADMIN — DILTIAZEM HYDROCHLORIDE 10 MG: 5 INJECTION, SOLUTION INTRAVENOUS at 22:21

## 2021-01-01 RX ADMIN — CYCLOSPORINE 25 MG: 25 CAPSULE ORAL at 11:27

## 2021-01-01 RX ADMIN — SODIUM CHLORIDE: 9 INJECTION, SOLUTION INTRAVENOUS at 23:18

## 2021-01-01 RX ADMIN — CYCLOSPORINE 25 MG: 25 CAPSULE ORAL at 21:07

## 2021-01-01 RX ADMIN — AMLODIPINE BESYLATE 2.5 MG: 2.5 TABLET ORAL at 10:57

## 2021-01-01 RX ADMIN — AMLODIPINE BESYLATE 2.5 MG: 2.5 TABLET ORAL at 08:57

## 2021-01-01 RX ADMIN — AMLODIPINE BESYLATE 5 MG: 5 TABLET ORAL at 08:26

## 2021-01-01 RX ADMIN — CEFTRIAXONE 1 G: 1 INJECTION, POWDER, FOR SOLUTION INTRAMUSCULAR; INTRAVENOUS at 17:43

## 2021-01-01 RX ADMIN — CYCLOSPORINE 25 MG: 25 CAPSULE ORAL at 10:09

## 2021-01-01 RX ADMIN — CYCLOSPORINE 25 MG: 25 CAPSULE ORAL at 20:11

## 2021-01-01 RX ADMIN — AMLODIPINE BESYLATE 2.5 MG: 2.5 TABLET ORAL at 17:53

## 2021-01-01 RX ADMIN — APIXABAN 2.5 MG: 2.5 TABLET, FILM COATED ORAL at 20:04

## 2021-01-01 RX ADMIN — AMLODIPINE BESYLATE 2.5 MG: 2.5 TABLET ORAL at 07:57

## 2021-01-01 RX ADMIN — APIXABAN 2.5 MG: 2.5 TABLET, FILM COATED ORAL at 20:23

## 2021-01-01 RX ADMIN — METOPROLOL SUCCINATE 25 MG: 25 TABLET, EXTENDED RELEASE ORAL at 20:04

## 2021-01-01 RX ADMIN — TETROFOSMIN 32 MCI.: 1.38 INJECTION, POWDER, LYOPHILIZED, FOR SOLUTION INTRAVENOUS at 14:55

## 2021-01-01 RX ADMIN — ONDANSETRON 4 MG: 4 TABLET, ORALLY DISINTEGRATING ORAL at 08:14

## 2021-01-01 RX ADMIN — AZATHIOPRINE 50 MG: 50 TABLET ORAL at 20:04

## 2021-01-01 RX ADMIN — CEFTRIAXONE SODIUM 1 G: 1 INJECTION, POWDER, FOR SOLUTION INTRAMUSCULAR; INTRAVENOUS at 18:55

## 2021-01-01 RX ADMIN — METOPROLOL SUCCINATE 25 MG: 25 TABLET, EXTENDED RELEASE ORAL at 08:26

## 2021-01-01 RX ADMIN — REGADENOSON 0.4 MG: 0.08 INJECTION, SOLUTION INTRAVENOUS at 14:51

## 2021-01-01 RX ADMIN — AMINOPHYLLINE 50 MG: 25 INJECTION, SOLUTION INTRAVENOUS at 14:59

## 2021-01-01 RX ADMIN — SODIUM CHLORIDE: 9 INJECTION, SOLUTION INTRAVENOUS at 11:22

## 2021-01-01 RX ADMIN — SODIUM CHLORIDE: 9 INJECTION, SOLUTION INTRAVENOUS at 03:27

## 2021-01-01 RX ADMIN — Medication 5 MG/HR: at 02:37

## 2021-01-01 RX ADMIN — HYDRALAZINE HYDROCHLORIDE 10 MG: 20 INJECTION INTRAMUSCULAR; INTRAVENOUS at 15:20

## 2021-01-01 RX ADMIN — AZATHIOPRINE 50 MG: 50 TABLET ORAL at 20:45

## 2021-01-01 RX ADMIN — CYCLOSPORINE 25 MG: 25 CAPSULE ORAL at 21:38

## 2021-01-01 RX ADMIN — CYCLOSPORINE 25 MG: 25 CAPSULE ORAL at 00:50

## 2021-01-01 RX ADMIN — APIXABAN 2.5 MG: 2.5 TABLET, FILM COATED ORAL at 14:37

## 2021-01-01 RX ADMIN — SODIUM CHLORIDE 1000 ML: 9 INJECTION, SOLUTION INTRAVENOUS at 19:54

## 2021-01-01 RX ADMIN — SODIUM CHLORIDE, POTASSIUM CHLORIDE, SODIUM LACTATE AND CALCIUM CHLORIDE 1000 ML: 600; 310; 30; 20 INJECTION, SOLUTION INTRAVENOUS at 10:57

## 2021-01-01 RX ADMIN — APIXABAN 2.5 MG: 2.5 TABLET, FILM COATED ORAL at 08:57

## 2021-01-01 RX ADMIN — HEPARIN SODIUM 5000 UNITS: 10000 INJECTION, SOLUTION INTRAVENOUS; SUBCUTANEOUS at 01:12

## 2021-01-01 RX ADMIN — HYDRALAZINE HYDROCHLORIDE 10 MG: 20 INJECTION INTRAMUSCULAR; INTRAVENOUS at 18:50

## 2021-01-01 RX ADMIN — HUMAN ALBUMIN MICROSPHERES AND PERFLUTREN 3 ML: 10; .22 INJECTION, SOLUTION INTRAVENOUS at 10:07

## 2021-01-01 RX ADMIN — HEPARIN SODIUM 5000 UNITS: 10000 INJECTION, SOLUTION INTRAVENOUS; SUBCUTANEOUS at 00:50

## 2021-01-01 RX ADMIN — AMLODIPINE BESYLATE 5 MG: 5 TABLET ORAL at 17:38

## 2021-01-01 RX ADMIN — CYCLOSPORINE 25 MG: 25 CAPSULE ORAL at 10:02

## 2021-01-01 ASSESSMENT — ENCOUNTER SYMPTOMS
PALPITATIONS: 1
CONSTIPATION: 0
FREQUENCY: 0
SHORTNESS OF BREATH: 1
FEVER: 0
COUGH: 0
DIFFICULTY URINATING: 0
DYSURIA: 0
HEMATURIA: 0
FEVER: 1
NAUSEA: 1
CHILLS: 1
SORE THROAT: 0
WEAKNESS: 1
DIARRHEA: 0
FATIGUE: 1
BLOOD IN STOOL: 0

## 2021-01-01 ASSESSMENT — MIFFLIN-ST. JEOR
SCORE: 1015.61
SCORE: 956.19
SCORE: 1008.81
SCORE: 970.25
SCORE: 922.62
SCORE: 966.17
SCORE: 1008.13

## 2021-01-01 ASSESSMENT — ACTIVITIES OF DAILY LIVING (ADL)
ADLS_ACUITY_SCORE: 16
FALL_HISTORY_WITHIN_LAST_SIX_MONTHS: NO
ADLS_ACUITY_SCORE: 17
ADLS_ACUITY_SCORE: 16
ADLS_ACUITY_SCORE: 17
ADLS_ACUITY_SCORE: 16
ADLS_ACUITY_SCORE: 14
WHICH_OF_THE_ABOVE_FUNCTIONAL_RISKS_HAD_A_RECENT_ONSET_OR_CHANGE?: AMBULATION
ADLS_ACUITY_SCORE: 15
WEAR_GLASSES_OR_BLIND: NO
ADLS_ACUITY_SCORE: 16

## 2021-01-01 ASSESSMENT — PATIENT HEALTH QUESTIONNAIRE - PHQ9
SUM OF ALL RESPONSES TO PHQ QUESTIONS 1-9: 24
SUM OF ALL RESPONSES TO PHQ QUESTIONS 1-9: 11

## 2021-03-21 ENCOUNTER — HEALTH MAINTENANCE LETTER (OUTPATIENT)
Age: 86
End: 2021-03-21

## 2021-08-09 NOTE — TELEPHONE ENCOUNTER
Patient states she is having a rapid heart  Beat.when walking with her walker.    She also reports she has sob when walking.    She reports having these symptoms for the last year. She normally sees her Nephrologist  regularly due to a kidney transplant.  And states she has not been seen by a primary care for about 1 year. Patient advised to be seen today. No appointments avail with PCP, Donna Baird MD.  She was advised to be seen at the  at   [place] .  Patient agreed to POC.    Lily Fox RN RN  [facility] Triage/refill nurse      Reason for Disposition    Heart beating very rapidly (e.g., > 140 / minute) and not present now (Exception: during exercise)    Protocols used: HEART RATE AND HEARTBEAT [id number]

## 2021-08-09 NOTE — TELEPHONE ENCOUNTER
I have an opening next Monday at 8:20 am  
Patient informed, states that she was just called by someone else and offered this appointment, but early mornings do not work well for her. Patient states she is going to look at other options, but may go to [facility] [place].  
Reason for Call:  Other appointment    Detailed comments: Would like to be seen before the end of September,  Is a long term patient of Dr Ocasio. Having heart troubles when walking. Would like an Xray of heart and lungs done before appointment. Sent to nurse for advice. Please call back when able to discuss    Phone Number Patient can be reached at: Home number on file [phone] (home)    Best Time: Any    Can we leave a detailed message on this number? YES    Call taken on 8/9/2021 at 8:54 AM by Addie James      
No

## 2021-08-09 NOTE — PROGRESS NOTES
Sent to ER for evaluation of progressively worsening shortness of breath and increased heart rate when walking.

## 2021-08-09 NOTE — ED TRIAGE NOTES
A&O x4.  ABC's intact.      Pt arrives with c/o trouble breathing, hard time walking, lung problem, fast heart rate with walking & history kidney transplant (1987)

## 2021-08-09 NOTE — ED PROVIDER NOTES
History   Chief Complaint:  Palpitations     The history is provided by the patient and the spouse.      Cande Baird is a 86 year old female with history of kidney transplant on cyclosporine who presents with increased heart rate and fatigue. The patient reports an increased heart rate, weakness, and dizziness after walking for the past year. When her heart rate increases she also reports feeling chest pain and short of breath. She additionally has the chills. Over the past 3 days her symptoms have increased and she went to Urgent Care today who then sent her to the ED. She denies any leg swelling or pain, fever, cough, congestion, sore throat, or any changes in urination or bowel movements. Also denies recent travel, or any blood clot history. The patient reports using tobacco for 20 years but quitting 7 years ago. The patient is covid vaccinated.     Review of Systems   Constitutional: Positive for chills and fatigue. Negative for fever.   HENT: Negative for congestion and sore throat.    Respiratory: Positive for shortness of breath. Negative for cough.    Cardiovascular: Positive for chest pain. Negative for leg swelling.   Gastrointestinal: Negative for blood in stool, constipation and diarrhea.   Genitourinary: Negative for decreased urine volume, difficulty urinating, dysuria, frequency, hematuria and urgency.   All other systems reviewed and are negative.      Allergies:  No Known Allergies    Medications:  aspirin 81 mg  azathioprine   cyclosporine   Glucosamine sulfate   saccharomyces boulardii   gengraf    Past Medical History:    Arthritis   Glomerulonephritis  Renal transplant recipient  Skin cancer  Anxiety   Secondary hyperparathyroidism of renal origin  Immunosuppressed status   Elevated BP without diagnosis of hypertension  Hypercalcemia    Past Surgical History:    Colonoscopy  laparoscopic cystectomy ovarian  Transplant of kidney       Family History:    Diabetes  Peptic ulcer  disease  Rheumatoid arthritis     Social History:  Presents with    PCP: Vazquez, [facility]     Physical Exam     Patient Vitals for the past 24 hrs:   BP Temp Temp src Pulse Resp SpO2   08/09/21 2100 (!) 177/106 -- -- -- -- --   08/09/21 1945 (!) 168/92 -- -- 88 -- --   08/09/21 1845 (!) 172/83 -- -- 81 -- 95 %   08/09/21 1830 (!) 187/97 -- -- -- -- --   08/09/21 1443 (!) 183/100 97.1  F (36.2  C) Temporal 102 16 96 %       Physical Exam  General: Elderly female sitting upright  Eyes: PERRL, pale pink conjunctive a.  No scleral icterus.  ENT: Moist mucous membranes, oropharynx clear.   CV: Normal S1S2, no murmur, rub or gallop. Regular rate and rhythm  Resp: Crackles at the bilateral bases.  Otherwise clear with no wheezes.. Normal respiratory effort.  Speaks in full sentences.  GI: Abdomen is soft, nontender and nondistended. No palpable masses. No rebound or guarding.  MSK: No edema. Nontender. Normal active range of motion.  Skin: Warm and dry.  Scattered ecchymoses on the extremities.  Chronic appearing skin changes of the bilateral lower extremities.  No rashes on visible skin.  Neuro: Alert and oriented. Responds appropriately to all questions and commands. No focal findings appreciated. Normal muscle tone.  Psych: Normal mood and affect. Pleasant.    Emergency Department Course   ECG  ECG taken at 1850, ECG read at 1850  Normal sinus rhythm. Incomplete right bundle branch block. Nonspecific ST abnormality. Abnormal ECG.   No significant changes as compared to prior, dated 09/25/2019.  Rate 87 bpm. LA interval 144 ms. QRS duration 94 ms. QT/QTc 384/462 ms. P-R-T axes 45 55 52.     Imaging:  XR Chest, PA & LAT:  Left ventricular heart border is accentuated by slight pectus deformity. Cardiac and mediastinal contours are unchanged from 2019. There are atheromatous calcifications of the arch and descending aorta.     No airspace opacities or interstitial thickening.     No pleural fluid or  pneumothorax.     No displaced rib fractures.  Per radiology     Laboratory:  CBC: WBC 9.6, HGB 12.1,   CMP: sodium 131 (L), glucose 141 (H), albumin 3.2 (L), GFR 35 (L) o/w WNL (Creatinine 1.38 (H))     TSH with free T4 reflex: 1.36  Troponin (Collected 1857): <0.015  Magnesium: 2.0  BNP: 2,052 (H)    Emergency Department Course:    Reviewed:  I reviewed nursing notes, vitals, past medical history and care everywhere    Assessments:  1852 I obtained history and examined the patient as noted above.   2208 I rechecked the patient and explained findings.     Interventions:  1954 0.9% sodium chloride bolus, 1,000 mL, IV    Disposition:  The patient was discharged to home.     Impression & Plan   Medical Decision Making:  Cande Baird is a 86 year old female presented with a sensation of palpitations and dyspnea on exertion and standing for prolonged periods.  She has had no syncope.  She has a sensation of chest pressure with the palpitations but otherwise no chest pain.  She has no sign of ischemia or injury on ECG.  No evidence of dysrhythmia.  Evaluation here is unremarkable with nonspecific elevation of BNP of unlikely clinical significance without no other clinical findings of suggest CHF.  The work up in the Emergency Department is negative, monitoring and EKG have not shown any abnormal heart rhythms or concerning morphologies.  I considered a broad differential diagnosis including acute coronary syndrome, myocardial infarction, CHF electrolyte abnormalities, endocrine disorder, anxiety, amongst others.  Treatment presentation not consistent with pulmonary embolism.  Electrolytes are normal and the patient is not anemic.  An echocardiogram and an event monitor were recommended and ordered. No serious acute life-threatening etiology for the palpitations were detected today during this visit and I feel the patient is safe for discharge. Close follow up with primary care is indicated in 3 days, as further  work up may be performed; this was made clear to the patient, who understands.  She felt better after IV fluid resuscitation and given her history of poor oral intake, dehydration may have played a role.      Covid-19  Cande Baird was evaluated during a global COVID-19 pandemic, which necessitated consideration that the patient might be at risk for infection with the SARS-CoV-2 virus that causes COVID-19.   Applicable protocols for evaluation were followed during the patient's care.   COVID-19 was considered as part of the patient's evaluation.     Diagnosis:    ICD-10-CM    1. Palpitations  R00.2 Leadless EKG Monitor 3 to 7 Days   2. Dyspnea on exertion  R06.00 Echocardiogram Complete     Leadless EKG Monitor 3 to 7 Days       Scribe Disclosure:  I, Patricia Wiley, am serving as a scribe at 6:36 PM on 8/9/2021 to document services personally performed by Sabrina Stanton MD based on my observations and the provider's statements to me.            Sabrina Stanton MD  08/09/21 [id number]
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
## 2021-08-10 NOTE — TELEPHONE ENCOUNTER
Patient advised and scheduled appointment.   Next 5 appointments (look out 90 days)    Aug 17, 2021 11:00 AM  SHORT with Donna Baird MD  [facility] [address] [facility] [address]  [phone]   Sep 27, 2021  3:40 PM  Office Visit with Donna Baird MD  [facility] [address]  [facility] [address]  [phone]

## 2021-08-10 NOTE — TELEPHONE ENCOUNTER
Patient calls she went to Urgent Care yesterday and was sent to the ER instead for shortness of breath and increased heart rate when walking. She was advised to follow-up with Dr. Baird in a few days. Patient requested appointment for follow-up, but states the appointment needs to be after 11am (she cannot come in earlier). Advised patient, no appointments with Dr. Baird this week before 11am. Offered appointment with another provider (Dr. Arora has openings) later in the week.     Patient asks if Dr. Baird can see her next week for follow-up after 11am.
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
## 2021-08-17 NOTE — PROGRESS NOTES
Assessment & Plan     Nausea  I am very concerned with her wt loss. Hard to tell just what is causing it. Will start with upper endoscopy and abdominal CT. I also think her going to live with the daughter is better for now as she needs to rest and I do not want her spending her energy cooking. Nor do I want her eating a lot of salty take out food.   - Adult Gastro Ref - Procedure Only; Future  - CT Abdomen w/o Contrast; Future    Weight loss  as above.   - Adult Gastro Ref - Procedure Only; Future  - CT Abdomen w/o Contrast; Future    Generalized muscle weakness  as above.          Depression Screening Follow Up    PHQ 8/17/2021   PHQ-9 Total Score 24   Q9: Thoughts of better off dead/self-harm past 2 weeks Nearly every day             No follow-ups on file.    Donna Baird MD  [facility]    Mark Castellon is a 86 year old who presents for the following health issues  accompanied by her daughter:    HPI     Daughter Juanita in room.    ED/UC Followup:    Facility:  [facility]  Date of visit: 8-9-2021  Reason for visit: BUNN       Over the past month or more she has become increasingly weak with no appetite often vomiting after drinking water or eating just a few bites of food. She was seen in the ER on 8/9 and was felt to be dehydrated there. She seemed to improve after given IV fluids. Her labs in the ER were pretty normal except for low albumin and sodium. She has lost over 10 lbs since 6/16.     Bowels are moving ok. No abdominal pain. She has had some chills but no real fever. She has a kidney transplant and says the meds have always made her a little nauseated but this is quite different.     She admits she is depressed but does not want to take medication for it.     She does all the cooking at home unless they order out. She is starting to feel too weak to take care of herself. Her daughter is willing to take her in short term. She does live with her husbnad but both of them  feel she is too weak for him to handle right now.     Review of Systems   Constitutional, HEENT, cardiovascular, pulmonary, GI, , musculoskeletal, neuro, skin, endocrine and psych systems are negative, except as otherwise noted.      Objective    /74 (Patient Position: Standing)   Pulse 118   Temp 99.5  F (37.5  C) (Oral)   Resp 24   Wt 60.6 kg (133 lb 9.6 oz)   SpO2 96%   BMI 23.29 kg/m    Body mass index is 23.29 kg/m .  Physical Exam   GENERAL: healthy, alert and no distress  NECK: no adenopathy, no asymmetry, masses, or scars and thyroid normal to palpation  RESP: lungs clear to auscultation - no rales, rhonchi or wheezes  CV: regular rate and rhythm, normal S1 S2, no S3 or S4, no murmur, click or rub, no peripheral edema and peripheral pulses strong  ABDOMEN: soft, nontender, no hepatosplenomegaly, no masses and bowel sounds normal  MS: no gross musculoskeletal defects noted, no edema  NEURO: gait is unsteady  PSYCH: mentation appears normal, affect flat and anxious

## 2021-08-23 NOTE — ADDENDUM NOTE
Addended by: ROXY STEVEN on: 8/23/2021 01:08 PM     Modules accepted: Orders     denies pain/discomfort

## 2021-08-23 NOTE — TELEPHONE ENCOUNTER
no I would recommend Zoloft its mild and I always start with a mild medication if someone has never been on an antidepressant before. So that would be the one I would start.

## 2021-08-23 NOTE — PROGRESS NOTES
Dr. Baird requesting to have urine culture ordered with [facility]. Order placed for urine culture using same diagnosis as used on [facility] order, but received ABN notification that diagnoses are not covered. Per Dr. Baird, use diagnosis of pyelonephritis. Diagnosis of pyelonephritis added.

## 2021-08-23 NOTE — TELEPHONE ENCOUNTER
Patient advised of message from Dr. Baird. Patient states she will think about this, does not want to start medication now.     Merari Reynsoo RN  [facility]    flexion weak/extension weak

## 2021-08-23 NOTE — TELEPHONE ENCOUNTER
Patient's  Cristofer (on consent to communicate) states the nurse at [facility] discussed Lexapro with them for anxiety and/or depression. He thinks this would be beneficial for patient and asks if this is something Dr. Baird would be willing to order.     Merari Reynoso RN  [facility]

## 2021-08-24 PROBLEM — N12 PYELONEPHRITIS: Status: ACTIVE | Noted: 2021-01-01

## 2021-08-24 NOTE — TELEPHONE ENCOUNTER
BMP shows increase in creatinine from 2 weeks ago. Urology appointment scheduled for 9/3, but patient needs to be seen urgently based on creatinine changes.   Dr. Baird requests this RN contact patient's transplant doctor to discuss.   Component      Latest Ref Rng & Units 8/9/2021 8/23/2021   Creatinine      0.52 - 1.04 mg/dL 1.38 (H) 1.69 (H)     [phone]. Contacted Dr. Díaz's office and spoke to his nurse Marcela regarding recent BMP results as well as 8/18/21 CT results. She believes patient would need to see urology and asks if she has been followed by Urology. Informed her she does not a Urologist through [facility], Dr. Baird had placed a referral, but her appointment is not scheduled until 9/3 and too far out due to labs. Marcela will discuss with Dr. Díaz, she will call back with Dr. Díaz's recommendations and if he can get her urgent consult with Urology.     Merari Reynoso RN  [facility]

## 2021-08-24 NOTE — TELEPHONE ENCOUNTER
Marcela calls back. She states Dr. Díaz said Urology won't see patient because she has had a transplant. He states she needs to go to [facility] and they can get Transplant team involved in care, she could lose her kidney if not treated. Dr. Baird advised, states to call patient and send her to [facility].     Patient and  advised, address provided for [facility]. Patient's  said they will call their daughter and have her drive them to the hospital.    Merari Reynoso RN  [facility]

## 2021-08-24 NOTE — TELEPHONE ENCOUNTER
Called for update, Marcela is at lunch. Message left with [place]NA Owens advising Dr. Baird is only in the clinic for about 1-1.5 hrs today, would like update for her prior to her leaving. Graciela will give her this message.    Merari Reynoso RN  [facility]
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
## 2021-08-25 NOTE — UTILIZATION REVIEW
Admission Status; Secondary Review Determination       Under the authority of the Utilization Management Committee, the utilization review process indicated a secondary review on the above patient. The review outcome is based on review of the medical records, discussions with staff, and applying clinical experience noted on the date of the review.     (x) Inpatient Status Appropriate - This patient's medical care is consistent with medical management for inpatient care and reasonable inpatient medical practice.     RATIONALE FOR DETERMINATION   85 yo female who is on chronic immunosuppression for transplanted kidney who was admitted for treatment of pyelonephritis. Given evidence of hydronephrosis and stranding on outpatient CT w/o contrast, patient's general malaise and pyuria on UA, pyelonephritis suspected vs VERONIQUE (intrinsic). Of note, she has a history of E.coli and Klebsiella UTI with prior resistances to ampicillin, ciprofloxacin and cefazolin. She is being treated with IV ceftriaxone pending urine culture and sensitivities. 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     At the time of admission with the information available to the attending physician more than 2 nights hospital complex care was anticipated, based on patient risk of adverse outcome if treated as outpatient and complex care required. Inpatient admission is appropriate based on the Medicare guidelines.     This document was produced using voice recognition software.    The information on this document is developed by the utilization review team in order for the business office to ensure compliance. This only denotes the appropriateness of proper admission status and does not reflect the quality of care rendered.   The definitions of Inpatient Status and Observation Status used in making the determination above are those provided in the CMS Coverage  Manual, Chapter 1 and Chapter 6, section 70.4.     Sincerely,   Merari Mackey MD  Utilization Review  Physician Advisor  [facility].

## 2021-08-25 NOTE — ED NOTES
[facility]   ED Nurse to Floor Handoff     Cande Baird is a 86 year old female who speaks English and lives with a spouse,  in a home  They arrived in the ED by car from home    ED Chief Complaint: Gait Problem, Fatigue, and Abnormal Labs    ED Dx;   Final diagnoses:   Pyelonephritis         Needed?: No    Allergies: No Known Allergies.  Past Medical Hx:   Past Medical History:   Diagnosis Date     Arthritis      Glomerulonephritis      Renal transplant recipient 1987     Skin cancer     does not see dermatologist      Baseline Mental status: WDL - forgetful  Current Mental Status changes: at basesline    Infection present or suspected this encounter: no  Sepsis suspected: No  Isolation type: No active isolations  Patient tested for COVID 19 prior to admission: YES     Activity level - Baseline/Home:  Independent  Activity Level - Current:   Stand with Assist    Bariatric equipment needed?: No    In the ED these meds were given:   Medications   hydrALAZINE (APRESOLINE) injection 10 mg (10 mg Intravenous Not Given 8/24/21 3586)   lidocaine 1 % 0.1-1 mL (has no administration in time range)   lidocaine (LMX4) cream (has no administration in time range)   sodium chloride (PF) 0.9% PF flush 3 mL (has no administration in time range)   sodium chloride (PF) 0.9% PF flush 3 mL (has no administration in time range)   melatonin tablet 1 mg (has no administration in time range)   polyethylene glycol (MIRALAX) Packet 17 g (has no administration in time range)   ondansetron (ZOFRAN-ODT) ODT tab 4 mg (has no administration in time range)     Or   ondansetron (ZOFRAN) injection 4 mg (has no administration in time range)   prochlorperazine (COMPAZINE) injection 5 mg (has no administration in time range)     Or   prochlorperazine (COMPAZINE) tablet 5 mg (has no administration in time range)     Or   prochlorperazine (COMPAZINE) suppository 12.5 mg (has no administration in  time range)   heparin ANTICOAGULANT injection 5,000 Units (has no administration in time range)   cefTRIAXone (ROCEPHIN) 1 g vial to attach to  mL bag for ADULTS or NS 50 mL bag for PEDS (0 g Intravenous Stopped 8/24/21 1926)   hydrALAZINE (APRESOLINE) injection 10 mg (10 mg Intravenous Given 8/24/21 1850)       Drips running?  No    Home pump  No    Current LDAs  Peripheral IV 08/24/21 Left Upper forearm (Active)   Site Assessment WDL 08/24/21 1801   Line Status Saline locked 08/24/21 1801   Dressing Intervention New dressing  08/24/21 1801   Phlebitis Scale 0-->no symptoms 08/24/21 1801   Infiltration Scale 0 08/24/21 1801   Number of days: 0       Labs results:   Labs Ordered and Resulted from Time of ED Arrival Up to the Time of Departure from the ED   COMPREHENSIVE METABOLIC PANEL - Abnormal; Notable for the following components:       Result Value    Sodium 130 (*)     Creatinine 1.67 (*)     Glucose 121 (*)     Alkaline Phosphatase 207 (*)     Albumin 3.3 (*)     GFR Estimate 28 (*)     All other components within normal limits   ROUTINE UA WITH MICROSCOPIC REFLEX TO CULTURE - Abnormal; Notable for the following components:    Appearance Urine Slightly Cloudy (*)     Protein Albumin Urine 20  (*)     Leukocyte Esterase Urine Moderate (*)     Bacteria Urine Few (*)     Mucus Urine Present (*)     WBC Urine 38 (*)     Squamous Epithelials Urine 4 (*)     All other components within normal limits    Narrative:     Urine Culture ordered based on laboratory criteria   CBC WITH PLATELETS AND DIFFERENTIAL - Abnormal; Notable for the following components:    Absolute Monocytes 1.7 (*)     All other components within normal limits   COVID-19 VIRUS (CORONAVIRUS) BY PCR - Normal    Narrative:     Testing was performed using the Xpert Xpress SARS-CoV-2 Assay on the  Cepheid Gene-Xpert Instrument Systems. Additional information about  this Emergency Use Authorization (EUA) assay can be found via the Lab  Guide. This  test should be ordered for the detection of SARS-CoV-2 in  individuals who meet SARS-CoV-2 clinical and/or epidemiological  criteria. Test performance is unknown in asymptomatic patients. This  test is for in vitro diagnostic use under the FDA EUA for  laboratories certified under CLIA to perform high complexity testing.  This test has not been FDA cleared or approved. A negative result  does not rule out the presence of PCR inhibitors in the specimen or  target RNA in concentration below the limit of detection for the  assay. The possibility of a false negative should be considered if  the patient's recent exposure or clinical presentation suggests  COVID-19. This test was validated by the [facility]. This laboratory is certified under  the Clinical Laboratory Improvement Amendments of 1988 (CLIA-88) as  qualified to perform high complexity laboratory testing.     CBC WITH PLATELETS & DIFFERENTIAL    Narrative:     The following orders were created for panel order CBC with platelets differential.  Procedure                               Abnormality         Status                     ---------                               -----------         ------                     CBC with platelets and d...[[id number]]  Abnormal            Final result                 Please view results for these tests on the individual orders.   IP ASSIGN PROVIDER TEAM TO TREATMENT TEAM   VITAL SIGNS   PERIPHERAL IV CATHETER   ACTIVITY   INTAKE AND OUTPUT   URINE CULTURE       Imaging Studies:   Recent Results (from the past 24 hour(s))   US Renal Transplant    Narrative    EXAMINATION: US RENAL TRANSPLANT, 8/24/2021 4:27 PM     COMPARISON: 8/18/2021    HISTORY: VERONIQUE    TECHNIQUE: Grey-scale, color Doppler and spectral flow analysis.    FINDINGS:  The transplant kidney is located in the right lower quadrant, and  measures 12.3 cm. Parenchyma is of normal thickness and echogenicity.  Simple  "renal cysts measuring up to 3.1 cm. No focal lesions. No  hydronephrosis. No perinephric fluid collection.    Renal artery flow:   77 cm/s peak systolic at hilum.  109 cm/s peak systolic at anastomosis.  Arcuate artery resistive indices (upper to lower): 0.77, 0.77, 0.74    Renal Vein Flow:  26 cm/s at hilum.   65 cm/s at anastomosis.    Iliac artery flow:  76 cm/s peak systolic above anastomosis.  70 cm/s peak systolic below anastomosis.    Iliac vein flow:  Patent above and below the anastomosis.        Impression    IMPRESSION:   1. Mild hydroureter without hydronephrosis. Scattered simple renal  cysts. Otherwise normal grayscale appearance of the renal transplant.   2. Patent renal transplant Doppler evaluation. Borderline elevated  arcuate artery resistive indices, which is nonspecific and can be seen  with medical renal disease or graft dysfunction/rejection.          HARPAL GROVER MD         SYSTEM ID:  [id number]       Recent vital signs:   BP (!) 140/65   Pulse 113   Temp 98.2  F (36.8  C) (Oral)   Resp 16   Ht 1.6 m (5' 3\")   Wt 59 kg (130 lb)   SpO2 93%   BMI 23.03 kg/m      Yelitza Coma Scale Score: 15 (08/24/21 1756)       Cardiac Rhythm: Other - NA  Pt needs tele? No  Skin/wound Issues: None    Code Status: DNR / DNI    Pain control: pt had none    Nausea control: pt had none    Abnormal labs/tests/findings requiring intervention: see results    Family present during ED course? Yes   Family Comments/Social Situation comments: Family went home for the night    Tasks needing completion: None    Maile Kumar RN  [facility] -- [phone] [facility]      "

## 2021-08-25 NOTE — PROGRESS NOTES
"SPIRITUAL HEALTH SERVICES  [facility] ([place]) 7C  REFERRAL SOURCE: Pt request with admission     Pt states that her charly in God is important in her life and that she is struggling with her spouse's recent change of belief that there is no God. She shares that she has been wondering about getting some counseling. She continues to share difficulties living with her spouse in which pt states \"He's very controlling.\" She also states \"I still love my . He needs me and I need him. We both are ready to die.\"  She affirms that they are both scared and continues to share negative encounters in their lives.    Supported pt through listening and reflective conversation. Affirmation of pt's charly, along with prayer. Encouraged pt to advocate for counseling for she and spouse.     PLAN:  remains available per pt/family/staff request.     Rev. Cande Hare MDiv, [facility]  Staff    Pager [phone]  * [facility] remains available 24/7 for emergent requests/referrals, either by having the switchboard page the on-call  or by entering an ASAP/STAT consult in Epic (this will also page the on-call ).*   "

## 2021-08-25 NOTE — PLAN OF CARE
Assumed care from [id number]. Pt experienced headache and Tylenol helped it.  VS: on RA when awake; but had 1L O2 overnight. Tachycardic and hypertensive. Notified team but no response pt is on scheduled  Hydralazine.  Neuro: A&Ox4 can be forgetful.   GI/: Denies nausea. Passing gas; Last BM 8/24. Voiding ok.  Diet/appetite: Combination Regular but recently developed poor appetite.   Activity: Up with Ax1 and uses walker at home.  Lines: PIV SL. Continue POC.

## 2021-08-25 NOTE — H&P
[facility]    History and Physical       Date of Admission:  8/24/2021    Assessment & Plan      Cande Baird is a 86 year old female with history of kidney transplant admitted on 8/24/2021 for concerns of pyelonephritis.    # Pyuria, likely pyelonephritis  Given evidence of hydronephrosis and stranding on outpatient CT w/o contrast, patient's general malaise and pyuria on UA, pyelonephritis suspected vs VERONIQUE (intrinsic). Of note, she has a history of E.coli and Klebsiella UTI.    - Cr elevated to 1.67, baseline around 1  - Ceftriaxone started  - Urine Culture pending 8/24  - Transplant Nephrology consulted, appreciate recs  - Urology consulted, appreciate recs  - Continuing prior to admission azathioprine and cyclosporine    - Admission cyclosporine level ordered    # Hypertension  Systolic up to 196 while in ED, now 149 following hydralazine and denies chest pain, vision changes or headache.   - Continue to monitor    # Hyponatremia  130 on admission, currently asymptomatic  - AM CMP pending, will continue to monitor     Diet:  Regular Diet  DVT Prophylaxis: Heparin  Benjamin Catheter: Not present  Access: PIV  Code Status: DNR/DNI    Disposition Plan    Inpatient admission with plans for discharge with family pending medical workup    The patient's care was discussed with Lacey Verma MD  [facility] Care Team  [facility]  Securely message with the Vocera Web Console (learn more here)  Text page via [employer] Paging/Directory  Please see sign in/sign out for up to date coverage information  ______________________________________________________________________    Chief Complaint   Hydronephrosis    History of Present Illness   Cande Baird is an 86 year old female with history of glomerulonephritis s/p renal transplant (1987, on cyclosporine and tacrolimus) who presents to the ED per outpatient physician (  Donna Baird) recommendation due to concerns of pyelonephritis.    Per chart review, patient has had symptoms of malaise, nausea and heart palpitations for the past year, These symptoms worsened over the past ~2 weeks alongside a recent decrease in appetite leading to 10 lb weight loss since . Outpatient CT without contrast showed mild hydronephrosis and stranding in her transplanted kidney with no evidence of renal stones. Follow-up labs showed elevated Cr, and patient was advised to come to ED for workup of pyelonephritis.    On arrival to the ED, patient was noted to be severely hypertensive to 190s and received 10mg Hydralazine.    When seen in ED, patient was alert and responsive to questions but noted to be a poor historian.     Review of Systems      Patient noted malaise but denied fevers, chills, headache, vision changes, chest pain, shortness of pain, abdominal pain, nausea, vomiting or diarrhea.    Past Medical History    Past Medical History:   Diagnosis Date     Arthritis      Glomerulonephritis      Renal transplant recipient      Skin cancer     does not see dermatologist     Past Surgical History   Past Surgical History:   Procedure Laterality Date     COLONOSCOPY  5/15/2013     LAPAROSCOPIC CYSTECTOMY OVARIAN (BENIGN)       TRANSPLANT  1987    Kidney     Social History   Social History     Tobacco Use     Smoking status: Former Smoker     Types: Cigarettes     Quit date: 1988     Years since quittin.2     Smokeless tobacco: Never Used   Vaping Use     Vaping Use: Never used   Substance Use Topics     Alcohol use: Yes     Alcohol/week: 0.0 standard drinks     Drug use: No     Family History   I have reviewed this patient's family history and updated it with pertinent information if needed.  Family History   Problem Relation Age of Onset     Diabetes Mother 60     Peptic Ulcer Disease Father      Diabetes Sister      Rheumatoid Arthritis Sister      Diabetes Sister         Prior to Admission Medications   Prior to Admission Medications   Prescriptions Last Dose Informant Patient Reported? Taking?   Cholecalciferol (VITAMIN D3 PO)   Yes No   Sig: Take 2,000 Units by mouth daily   GLUCOSAMINE SULFATE PO   Yes No   Sig: Take 1,000 mg by mouth 2 times daily Takes 1000mg in the morning and 1000mg in the evening   aspirin 81 MG tablet   Yes No   Sig: Take by mouth daily   azaTHIOprine (IMURAN) 50 MG tablet   No No   Sig: Take 1 tablet (50 mg) by mouth daily   ciprofloxacin (CIPRO) 500 MG tablet   No No   Sig: Take 1 tablet (500 mg) by mouth 2 times daily   ciprofloxacin (CIPRO) 500 MG tablet   No No   Sig: Take 1 tablet (500 mg) by mouth 2 times daily   cycloSPORINE modified (GENERIC EQUIVALENT) 25 MG capsule   Yes No   Sig: Take 3 capsules (75 mg) by mouth   saccharomyces boulardii (FLORASTOR) 250 MG capsule   Yes No   Sig: Take 250 mg by mouth daily      Facility-Administered Medications: None     Allergies   No Known Allergies    Physical Exam   Vital Signs: Temp: 98.2  F (36.8  C) Temp src: Oral BP: (!) 149/87 Pulse: 114   Resp: 16 SpO2: 97 % O2 Device: None (Room air)    Weight: 130 lbs 0 oz  General: Patient is anxious but alert and responsive to questions and follows commands  Cardio: Regular rate and rhythm, no murmurs notes  Pulm: Clear to auscultation bilaterally, good movement of air  Abdominal: Bowel sounds present, no discoloration, transplant kidney palpated in right lower quadrant, no tenderness to palpation  Extremities: Warm with palpable pulses, no edema noted  Neuro: Alert and oriented x3, 5/5  strength    Labs     BMP    Na 130   Cr 1.67 (Baseline around 1)  Ca 9.9    CBC RESULTS: Recent Labs   Lab Test 08/24/21  1748   WBC 10.9   RBC 4.17   HGB 12.5   HCT 38.8   MCV 93   MCH 30.0   MCHC 32.2   RDW 14.6        UA RESULTS:  Recent Labs   Lab Test 08/24/21  2100 08/23/21  1435   COLOR Light Yellow Yellow   APPEARANCE Slightly Cloudy* Slightly Cloudy*    URINEGLC Negative Negative   URINEBILI Negative Negative   URINEKETONE Negative Trace*   SG 1.016 1.020   UBLD Negative Trace*   URINEPH 5.5 6.0   PROTEIN 20 * 30 *   UROBILINOGEN  --  0.2   NITRITE Negative Positive*   LEUKEST Moderate* Moderate*   RBCU 1 0-2   WBCU 38* 10-25*     Imaging    8/18/2021 Renal Ultrasound    - Mild hydroureter without hydronephrosis, simple renal cysts

## 2021-08-25 NOTE — CONSULTS
[facility]  Transplant Nephrology Consult  Date of Admission:  8/24/2021  Today's Date: 08/25/2021  Requesting physician: Masoud Gr MD    Recommendations:  - recommend no more fluid after the bolus she is receiving.   - agree with antibiotic choice, follow up UC and sensitivity.  - check cyclosporine level tomorrow (ordered)    Assessment & Plan   # DDKT: VERONIQUE improving   - Baseline Creatinine: ~ 1   - Proteinuria: Not checked recently   - Date DSA Last Checked: Not Known      Latest DSA: Not checked recently due to time from transplant   - BK Viremia: Not checked recently due to time from transplant   - Kidney Tx Biopsy: No    # Immunosuppression: Cyclosporine (goal 50-75) and Azathioprine (dose 50 mg daily)   - Changes: No    # Infection Prophylaxis:   - PJP: None    # Hypertension: Controlled;  Goal BP: < 130/80   - Volume status: Euvolemic     - Changes: No    # Anemia in Chronic Renal Disease: Hgb: Stable      JAMEL: No   - Iron studies: Not checked recently     # Electrolytes:   - Potassium; level: Normal        On supplement: No  - Bicarbonate; level: Normal        On supplement: No    # UTI/pyelonephritis : agree with ceftriaxone , follow up UC and sensitivity     # Transplant History:  Etiology of Kidney Failure: Chronic glomerulonephritis (GN)  Tx: DDKT  Transplant: 10/17/1987 (Kidney)  Significant changes in immunosuppression: None  Significant transplant-related complications: None    Recommendations were communicated to the primary team via this note.    Seen and discussed with Dr. Eleuterio Murray MD  Pager: [phone]    This patient has been seen and evaluated by me, Mellissa Mcclellan MD.  I have reviewed the note and agree with plan of care as documented by the fellow.  Mellissa Mcclellan MD on 8/25/2021 at 7:45 PM    REASON FOR CONSULT   IS managemenet    History of Present Illness   Cande Baird is a 86 year old female with PMH of GN unknown type  who had a DDKT in 1987 , baseline creatinine around 1 on cyclosporine 50-75 and imuran 50 mg daily who was sent from her doctor office when she was found to be hypertensive. He work up revealed UTI. She denies any dysuria fever or urgency. She denies any diarrhea, N/V/SOB or CP.    Review of Systems    The 10 point Review of Systems is negative other than noted in the HPI or here.     Past Medical History    I have reviewed this patient's medical history and updated it with pertinent information if needed.   Past Medical History:   Diagnosis Date    Arthritis     Glomerulonephritis     Renal transplant recipient 1987    Skin cancer     does not see dermatologist       Past Surgical History   I have reviewed this patient's surgical history and updated it with pertinent information if needed.  Past Surgical History:   Procedure Laterality Date    COLONOSCOPY  5/15/2013    LAPAROSCOPIC CYSTECTOMY OVARIAN (BENIGN)      TRANSPLANT  October 17, 1987    Kidney       Family History   I have reviewed this patient's family history and updated it with pertinent information if needed.   Family History   Problem Relation Age of Onset    Diabetes Mother 60    Peptic Ulcer Disease Father     Diabetes Sister     Rheumatoid Arthritis Sister     Diabetes Sister        Social History   I have reviewed this patient's social history and updated it with pertinent information if needed. Cande Baird  reports that she quit smoking about 33 years ago. Her smoking use included cigarettes. She has never used smokeless tobacco. She reports current alcohol use. She reports that she does not use drugs.    Allergies   No Known Allergies  Prior to Admission Medications    amLODIPine  2.5 mg Oral Daily    azaTHIOprine  50 mg Oral Daily    cefTRIAXone  1 g Intravenous Q24H    cycloSPORINE modified  25 mg Oral BID    heparin ANTICOAGULANT  5,000 Units Subcutaneous Q12H    hydrALAZINE  10 mg Intravenous Once    sodium chloride (PF)  3 mL Intracatheter  "Q8H          Physical Exam   Temp  Av.8  F (36.6  C)  Min: 96.8  F (36  C)  Max: 98.8  F (37.1  C)      Pulse  Av.7  Min: 87  Max: 117 Resp  Av  Min: 16  Max: 16  SpO2  Av.8 %  Min: 92 %  Max: 97 %     BP (!) 148/60   Pulse 87   Temp 96.8  F (36  C) (Temporal)   Resp 16   Ht 1.6 m (5' 3\")   Wt 58.9 kg (129 lb 13.6 oz)   SpO2 94%   BMI 23.00 kg/m     Date 21 07 - 21 0659   Shift 6577-8067 3534-4804 4442-1370 24 Hour Total   INTAKE   Shift Total(mL/kg)       OUTPUT   Urine 200 225  425   Shift Total(mL/kg) 200(3.4) 225(3.82)  425(7.22)   Weight (kg) 58.9 58.9 58.9 58.9      Admit Weight: 59 kg (130 lb)     GENERAL APPEARANCE: alert and no distress  HENT: mouth without ulcers or lesions  LYMPHATICS: no cervical or supraclavicular nodes  RESP: lungs clear to auscultation - no rales, rhonchi or wheezes  CV: regular rhythm, normal rate, no rub, no murmur  EDEMA: no LE edema bilaterally  ABDOMEN: soft, nondistended, nontender, bowel sounds normal  MS: extremities normal - no gross deformities noted, no evidence of inflammation in joints, no muscle tenderness  SKIN: no rash  TX KIDNEY: normal    Data   CMP  Recent Labs   Lab 21  0803 21  1439    130*  131*   POTASSIUM 3.9 4.5  4.4   CHLORIDE 100 96  96   CO2 24 27  29   ANIONGAP 9 7  6   GLC 95 121*  121*   BUN 26 29  29   CR 1.57* 1.67*  1.69*   GFRESTIMATED 30* 28*  27*   LETI 9.0 9.9  9.8   MAG 2.0  --    PROTTOTAL 6.6* 7.2   ALBUMIN 2.7* 3.3*   BILITOTAL 0.6 0.6   ALKPHOS 189* 207*   AST 31 31   ALT 28 34     CBC  Recent Labs   Lab 21  0803 21  1748   HGB 11.7 12.5   WBC 8.8 10.9   RBC 3.92 4.17   HCT 35.9 38.8   MCV 92 93   MCH 29.8 30.0   MCHC 32.6 32.2   RDW 14.7 14.6    365     INR  Recent Labs   Lab 21  0803   INR 1.18*   PTT 30     ABGNo lab results found in last 7 days.   Urine Studies  Recent Labs   Lab Test 21  2100 21  1435 20  1358 19  1118 " 09/25/19  1343   COLOR Light Yellow Yellow Yellow Yellow Yellow   APPEARANCE Slightly Cloudy* Slightly Cloudy* Clear Clear Clear   URINEGLC Negative Negative Negative Negative Negative   URINEBILI Negative Negative Negative Negative Negative   URINEKETONE Negative Trace* Negative Negative Negative   SG 1.016 1.020 1.015 1.015 1.010   UBLD Negative Trace* Negative Negative Negative   URINEPH 5.5 6.0 6.0 7.0 6.5   PROTEIN 20 * 30 * Negative Negative Negative   UROBILINOGEN  --  0.2 1.0 0.2 0.2   NITRITE Negative Positive* Negative Negative Negative   LEUKEST Moderate* Moderate* Small* Moderate* Large*   RBCU 1 0-2 O - 2 O - 2 O - 2   WBCU 38* 10-25* 0 - 5 5-10* 10-25*     Recent Labs   Lab Test 09/13/16  1352   UTPG 0.22*     PTH  Recent Labs   Lab Test 08/15/17  1047 05/26/17  1043 05/27/16  0949   PTHI 75* 90* 102*     Iron Studies  No lab results found.    IMAGING:  All imaging studies reviewed by me.

## 2021-08-25 NOTE — PLAN OF CARE
BP running high, IV Hydralazine given x1 with good results. Also runs slightly tachy at times, Other VSS. Denies pain and nausea, on a regular diet. Up with SBA and walker. Adequate UOP. Passing gas, no BM this shift. Spoke with  today, see note. PIV running IVF, to be d/c'd once bolus done.  Continue POC

## 2021-08-25 NOTE — PROGRESS NOTES
[facility]    Progress Note - Marlior 1 Service        Date of Admission:  8/24/2021    Assessment & Plan             Cande Baird is a 86 year old female with pmhx of kidney transplant admitted on 8/24/2021 for concerns of pyelonephritis and hypertensive urgency/emergency.     # Pyuria with pyelonephritis in a transplant patient  # acute kidney injury  Given initial evidence of possible hydronephrosis and stranding on outpatient CT w/o contrast, patient's general malaise, Cr rise, and pyuria on UA, concerned for pyelonephritis. However has been afebrile and with no leukocytosis. Of note, she has a history of E.coli and Klebsiella UTI in the past with fluoroquinolone resistance.  - Continue Ceftriaxone   - F/u Urine Culture  - Transplant Nephrology consulted, appreciate recs    # DDKT  - Etiology: Chronic glomerulonephritis (GN)  - Transplant date: 10/17/1987 ([facility] Kidney)  - Baseline Creatinine: ~ 1  - Proteinuria: Not checked recently  - Date DSA Last Checked: Not Known        - Latest DSA: Not checked recently due to time from transplant  - BK Viremia: Not checked recently due to time from transplant  - Kidney Tx Biopsy: No  - Continuing prior to admission azathioprine and cyclosporine  - Cyclosporin level tomorrow     # Nonoliguric VERONIQUE  Cr 1.67 on admission up from baseline around 1-1.1. Suspect prerenal in setting of poor oral intake over last several weeks. Likely infectious component as well. No obvious obstruction on transplant US however did reveal borderline elevated arcuate artery resistive indices, which is nonspecific and can be seen with medical renal disease or graft dysfunction/rejection. Transplant on board. Rechecking cyclosporin levels tomorrow.   - 1L LR at 125cc/hr x 8hrs   - Encourage PO intake  - BMP in AM.   - Cyclosporin level in AM    # Hypertensive urgency vs emergency   Systolic up to 196 while in ED with evidence of VERONIQUE c/f hypertensive  emergency. Improved significantly after 1x dose of hydralazine though systolic Bps remains elevated in 150s. Goal is <140/90. Outpatient nephrology was planning to start low dose amlodipine. Will initiate inpatient.   - Amlodipine 2.5mg daily  - Hydralazine prn for SBP >180 or DBP >110 for 30 minutes sustained     # Hyponatremia- resolved        Diet: Combination Diet Regular Diet Adult    DVT Prophylaxis: Heparin SQ  Benjamin Catheter: Not present  Fluids: 1L bolus x1  Central Lines: None  Code Status: No CPR- Do NOT Intubate      Disposition Plan   Expected discharge: 08/28/2021   recommended to prior living arrangement once antibiotic plan established.     The patient's care was discussed with the Attending Physician, Dr. Masoud Gr.    Alvaro Summers MD  [address]  Securely message with the Vocera Web Console (learn more here)  Text page via [employer] Paging/Directory  Please see sign in/sign out for up to date coverage information      ___________________________________________________________________    Interval History   NAEO. Nursing notes reviewed. Has no pain, nausea, vomiting, fever, chills, dysuria, hematuria this morning. She is hungry and would like to order breakfast. No other issues.     4 point ROS reviewed.     Data reviewed today: I reviewed all medications, new labs and imaging results over the last 24 hours.     Physical Exam   Vital Signs: Temp: 96.8  F (36  C) Temp src: Temporal BP: (!) 148/60 (after hydralazine) Pulse: 87   Resp: 16 SpO2: 94 % O2 Device: None (Room air)    Weight: 129 lbs 13.62 oz  General Appearance: Pleasant elderly patient in NAD  Respiratory: CTA without crackles or wheezing   Cardiovascular: RRR   GI: soft ntnd, RLQ transplant kidney palpated. No pain.   Skin: no concerning rashes or lesions      Data   Recent Labs   Lab 08/25/21  0803 08/24/21  1748 08/23/21  1439   WBC 8.8 10.9  --    HGB 11.7 12.5  --    MCV  92 93  --     365  --    INR 1.18*  --   --      --  130*  131*   POTASSIUM 3.9  --  4.5  4.4   CHLORIDE 100  --  96  96   CO2 24  --  27  29   BUN 26  --  29  29   CR 1.57*  --  1.67*  1.69*   ANIONGAP 9  --  7  6   LETI 9.0  --  9.9  9.8   GLC 95  --  121*  121*   ALBUMIN 2.7*  --  3.3*   PROTTOTAL 6.6*  --  7.2   BILITOTAL 0.6  --  0.6   ALKPHOS 189*  --  207*   ALT 28  --  34   AST 31  --  31     No results found for this or any previous visit (from the past 24 hour(s)).     Internal Medicine Staff Addendum  Date of Service: 8/25/2021  I have seen and examined Cande Baird with the resident team, reviewed the data and discussed the plan of care with the patient and the care team on P&FC Rounds.  I agree with the above documentation     I discussed pt's care with bedside RN, case management/social work today.  I personally reviewed labs, medications and past 24 hr notes.  Assessment/Plan/Diagnoses: plan/dx as above, which contains my edits and reflects our joint medical decision-making.     Masoud Gr MD  Internal Medicine/Pediatrics Hospitalist & Staff Physician   of Internal Medicine and Pediatrics  [facility]  Pager: [phone]

## 2021-08-25 NOTE — PROVIDER NOTIFICATION
Paged team about pt /80. Also pt is feeling headache and requesting Tylenol. Provider ordered Tylenol.

## 2021-08-25 NOTE — CONSULTS
Care Management Initial Consult    General Information  Assessment completed with: Patient, VM-chart review,    Type of CM/SW Visit: Initial Assessment    Primary Care Provider verified and updated as needed: Yes   Readmission within the last 30 days: no previous admission in last 30 days      Reason for Consult: discharge planning  Advance Care Planning: Advance Care Planning Reviewed: no concerns identified          Communication Assessment  Patient's communication style: spoken language (English or Bilingual)    Hearing Difficulty or Deaf: no   Wear Glasses or Blind: no    Cognitive  Cognitive/Neuro/Behavioral: WDL  Level of Consciousness: alert  Arousal Level: opens eyes spontaneously  Orientation: oriented x 4  Mood/Behavior: other (see comments)  Best Language: 0 - No aphasia  Speech: clear, spontaneous    Living Environment:   People in home: facility resident, spouse  Cristofer- spouse  Current living Arrangements: apartment, assisted living  Name of Facility: [facility]   Able to return to prior arrangements: yes  Living Arrangement Comments:  (MEENAKSHI will start to provide cares for her and spouse)    Family/Social Support:  Care provided by: self  Provides care for: no one  Marital Status:   , Children  Cristofer       Description of Support System: Supportive    Support Assessment: Adequate family and caregiver support, Adequate social supports    Current Resources:   Patient receiving home care services: No     Community Resources: None  Equipment currently used at home: walker, standard  Supplies currently used at home: None    Employment/Financial:  Employment Status:          Financial Concerns: insurance, none           Lifestyle & Psychosocial Needs:  Social Determinants of Health     Tobacco Use: Medium Risk     Smoking Tobacco Use: Former Smoker     Smokeless Tobacco Use: Never Used   Alcohol Use:      Frequency of Alcohol Consumption:      Average Number of Drinks:      Frequency of  Binge Drinking:    Financial Resource Strain:      Difficulty of Paying Living Expenses:    Food Insecurity:      Worried About Running Out of Food in the Last Year:      Ran Out of Food in the Last Year:    Transportation Needs:      Lack of Transportation (Medical):      Lack of Transportation (Non-Medical):    Physical Activity:      Days of Exercise per Week:      Minutes of Exercise per Session:    Stress:      Feeling of Stress :    Social Connections:      Frequency of Communication with Friends and Family:      Frequency of Social Gatherings with Friends and Family:      Attends Congregational Services:      Active Member of Clubs or Organizations:      Attends Club or Organization Meetings:      Marital Status:    Intimate Partner Violence:      Fear of Current or Ex-Partner:      Emotionally Abused:      Physically Abused:      Sexually Abused:    Depression: Not at risk     PHQ-2 Score: 0   Housing Stability:      Unable to Pay for Housing in the Last Year:      Number of Places Lived in the Last Year:      Unstable Housing in the Last Year:        Functional Status:  Prior to admission patient needed assistance:              Mental Health Status:  Mental Health Status: No Current Concerns       Chemical Dependency Status:                Values/Beliefs:  Spiritual, Cultural Beliefs, Congregational Practices, Values that affect care: no  Description of Beliefs that Will Affect Care: angelique            Additional Information:  This writer called and spoke with patient regarding discharge planning. Cande states that she lives in an assisted living facility [facility]. She gave permission for this writer to call and speak to facility nurses. Cande states that before she was admitted to the hospital, she was going to sign up for more services with the assisted living facility. She states that she had been staying with her daughter just prior to admission, but will be planning to go back to [facility] where she  and her -Cristofer live. They are now requiring more services to help them.   This writer called and spoke with Antionette Baird [phone] who is the nurse at [facility]. She confirmed that Cande was going to need more services- and they would be able to provide those services for Cande and her . This writer asked regarding therapy and Antionette states they have in-house therapy that they would be able to provide for her.     Care management team will continue to follow for additional needs.    Yamilex Reyes, RN  Float RN Care Coordinator  Pager [phone] (unit RNCC pager)     To get in touch with the Weekend & Holiday on call RN Care Coordinator:  Pager:  [phone] OR Care Coordinator job code/pager [phone]

## 2021-08-26 NOTE — TELEPHONE ENCOUNTER
Juanita patients daughter calling. Patient discharged from hospital and told to follow up. Juanita not wanting to see anyone other than Dr Baird. No openings in the next 7 days. Please advise. Ok to call and  [phone]

## 2021-08-26 NOTE — PROGRESS NOTES
Care Management Follow Up    Length of Stay (days): 2    Expected Discharge Date: 08/26/2021     Concerns to be Addressed:    Return to  [facility]  Patient plan of care discussed at interdisciplinary rounds: Yes    Anticipated Discharge Disposition:  [facility]      Anticipated Discharge Services: therapy that can be provided by [facility] living [facility]    Anticipated Discharge DME:  PTA    Patient/family educated on Medicare website which has current facility and service quality ratings: NA    Education Provided on the Discharge Plan: Yes   Patient/Family in Agreement with the Plan: Yes     Referrals Placed by CM/SW: None   Private pay costs discussed: Not applicable    Additional Information:  This writer called [facility]- left a VM for Antionette [phone] , whom this writer spoke with yesterday. Patient is ready to go back today and daughter will be providing transportation.   This writer spoke with Antionette- nurse from [facility] as well. They are able to provide additional services for Cande when she goes back.     Orders will be in for physical and occupational therapy.    [facility]   [address], [place] [address]   P [phone]   F [phone]    Yamilex Reyes RN  Float RN Care Coordinator  Pager [phone] (unit RNCC pager)     To get in touch with the Weekend & Holiday on call RN Care Coordinator:  Pager:  [phone] OR Care Coordinator job code/pager [phone]

## 2021-08-26 NOTE — PROGRESS NOTES
[facility]  Transplant Nephrology Progress Note  Date of Admission:  8/24/2021  Today's Date: 08/26/2021  Requesting physician: Masoud Gr MD    Recommendations:  - Augmentin for 2 week.  - BMP tomorrow with 12 hr CSA trough ?accurate level.may need IVF outpatient if Scr trends up. (communicated with her coordinator).    Assessment & Plan   # DDKT: VERONIQUE improving   - Baseline Creatinine: ~ 1   - Proteinuria: Not checked recently   - Date DSA Last Checked: Not Known      Latest DSA: Not checked recently due to time from transplant   - BK Viremia: Not checked recently due to time from transplant   - Kidney Tx Biopsy: No  Estimated Creatinine Clearance: 26.7 mL/min (A) (based on SCr of 1.46 mg/dL (H)).    # Immunosuppression: Cyclosporine (goal 50-75) and Azathioprine (dose 50 mg daily)   - Changes: No    # Infection Prophylaxis:   - PJP: None    # Hypertension: Controlled;  Goal BP: < 130/80   - Volume status: Euvolemic     - Changes: No    # Anemia in Chronic Renal Disease: Hgb: Stable      JAMEL: No   - Iron studies: Not checked recently     # Electrolytes:   - Potassium; level: Normal        On supplement: No  - Bicarbonate; level: Normal        On supplement: No       # UTI/pyelonephritis : agree with ceftriaxone , will be discharged on Augmentin for 2 weeks. (note Ucx NGTD ?sent after receiving Abx)    # Transplant History:  Etiology of Kidney Failure: Chronic glomerulonephritis (GN)  Tx: DDKT  Transplant: 10/17/1987 (Kidney)  Significant changes in immunosuppression: None  Significant transplant-related complications: None    Recommendations were communicated to the primary team via this note.    Seen and discussed with Dr. Eleuterio Murray MD  Pager: [phone]    This patient has been seen and evaluated by me, Mellissa Mcclellan MD.  I have reviewed the note and agree with plan of care as documented by the fellow.  Mellissa Mcclellan MD on 8/26/2021 at 3:58  PM      Interval History:  She reports some nausea when she takes pills but able to keep it down,denies any diarrhea,SOB or CP. Poor PO intake. No thrush noted o exam    Review of Systems    The 4 point Review of Systems is negative other than noted in the HPI or here.     Current Facility-Administered Medications   Medication     acetaminophen (TYLENOL) tablet 650 mg     amLODIPine (NORVASC) tablet 2.5 mg     azaTHIOprine (IMURAN) tablet 50 mg     cefTRIAXone (ROCEPHIN) 1 g vial to attach to  mL bag for ADULTS or NS 50 mL bag for PEDS     cycloSPORINE modified (GENGRAF BRAND) capsule 25 mg     heparin ANTICOAGULANT injection 5,000 Units     hydrALAZINE (APRESOLINE) injection 10 mg     hydrALAZINE (APRESOLINE) injection 10 mg     lidocaine (LMX4) cream     lidocaine 1 % 0.1-1 mL     melatonin tablet 1 mg     ondansetron (ZOFRAN-ODT) ODT tab 4 mg    Or     ondansetron (ZOFRAN) injection 4 mg     polyethylene glycol (MIRALAX) Packet 17 g     prochlorperazine (COMPAZINE) injection 5 mg    Or     prochlorperazine (COMPAZINE) tablet 5 mg    Or     prochlorperazine (COMPAZINE) suppository 12.5 mg     sodium chloride (PF) 0.9% PF flush 3 mL     sodium chloride (PF) 0.9% PF flush 3 mL     Current Outpatient Medications   Medication     [START ON 2021] amLODIPine (NORVASC) 2.5 MG tablet     amoxicillin-clavulanate (AUGMENTIN) 875-125 MG tablet     ondansetron (ZOFRAN-ODT) 4 MG ODT tab     aspirin 81 MG tablet     azaTHIOprine (IMURAN) 50 MG tablet     Cholecalciferol (VITAMIN D3 PO)     cycloSPORINE modified (GENERIC EQUIVALENT) 25 MG capsule     GLUCOSAMINE SULFATE PO     saccharomyces boulardii (FLORASTOR) 250 MG capsule       Physical Exam   Temp  Av.8  F (36.6  C)  Min: 96.8  F (36  C)  Max: 98.8  F (37.1  C)      Pulse  Av.7  Min: 87  Max: 117 Resp  Av  Min: 16  Max: 16  SpO2  Av.8 %  Min: 92 %  Max: 97 %     BP (!) 165/81 (BP Location: Right arm)   Pulse 105   Temp 99  F (37.2  C)  "(Temporal)   Resp 16   Ht 1.6 m (5' 3\")   Wt 58.9 kg (129 lb 13.6 oz)   SpO2 93%   BMI 23.00 kg/m     Date 08/25/21 0700 - 08/26/21 0659   Shift 7400-1386 2430-2529 7067-5024 24 Hour Total   INTAKE   Shift Total(mL/kg)       OUTPUT   Urine 200 225  425   Shift Total(mL/kg) 200(3.4) 225(3.82)  425(7.22)   Weight (kg) 58.9 58.9 58.9 58.9      Admit Weight: 59 kg (130 lb)     GENERAL APPEARANCE: alert and no distress  HENT: mouth without ulcers or lesions  LYMPHATICS: no cervical or supraclavicular nodes  RESP: lungs clear to auscultation - no rales, rhonchi or wheezes  CV: regular rhythm, normal rate, no rub, no murmur  EDEMA: no LE edema bilaterally  ABDOMEN: soft, nondistended, nontender, bowel sounds normal  MS: extremities normal - no gross deformities noted, no evidence of inflammation in joints, no muscle tenderness  SKIN: no rash  TX KIDNEY: normal    Data   CMP  Recent Labs   Lab 08/26/21  0818 08/25/21  0803 08/23/21  1439   * 133 130*  131*   POTASSIUM 3.7 3.9 4.5  4.4   CHLORIDE 99 100 96  96   CO2 25 24 27  29   ANIONGAP 8 9 7  6   GLC 95 95 121*  121*   BUN 21 26 29  29   CR 1.46* 1.57* 1.67*  1.69*   GFRESTIMATED 33* 30* 28*  27*   LETI 9.0 9.0 9.9  9.8   MAG  --  2.0  --    PROTTOTAL 6.7* 6.6* 7.2   ALBUMIN 2.6* 2.7* 3.3*   BILITOTAL 0.4 0.6 0.6   ALKPHOS 190* 189* 207*   AST 26 31 31   ALT 23 28 34     CBC  Recent Labs   Lab 08/26/21  0818 08/25/21  0803 08/24/21  1748   HGB 12.0 11.7 12.5   WBC 9.8 8.8 10.9   RBC 4.00 3.92 4.17   HCT 36.7 35.9 38.8   MCV 92 92 93   MCH 30.0 29.8 30.0   MCHC 32.7 32.6 32.2   RDW 14.9 14.7 14.6    332 365     INR  Recent Labs   Lab 08/25/21  0803   INR 1.18*   PTT 30     ABGNo lab results found in last 7 days.   Urine Studies  Recent Labs   Lab Test 08/24/21  2100 08/23/21  1435 01/14/20  1358 12/04/19  1118 09/25/19  1343   COLOR Light Yellow Yellow Yellow Yellow Yellow   APPEARANCE Slightly Cloudy* Slightly Cloudy* Clear Clear Clear "   URINEGLC Negative Negative Negative Negative Negative   URINEBILI Negative Negative Negative Negative Negative   URINEKETONE Negative Trace* Negative Negative Negative   SG 1.016 1.020 1.015 1.015 1.010   UBLD Negative Trace* Negative Negative Negative   URINEPH 5.5 6.0 6.0 7.0 6.5   PROTEIN 20 * 30 * Negative Negative Negative   UROBILINOGEN  --  0.2 1.0 0.2 0.2   NITRITE Negative Positive* Negative Negative Negative   LEUKEST Moderate* Moderate* Small* Moderate* Large*   RBCU 1 0-2 O - 2 O - 2 O - 2   WBCU 38* 10-25* 0 - 5 5-10* 10-25*     Recent Labs   Lab Test 09/13/16  1352   UTPG 0.22*     PTH  Recent Labs   Lab Test 08/15/17  1047 05/26/17  1043 05/27/16  0949   PTHI 75* 90* 102*     Iron Studies  No lab results found.    IMAGING:  All imaging studies reviewed by me.

## 2021-08-26 NOTE — DISCHARGE SUMMARY
[facility]  Discharge Summary - Medicine & Pediatrics       Date of Admission:  8/24/2021  Date of Discharge:  8/26/2021  Discharging Provider: Dr Masoud Gr  Discharge Service: Calos Darling    Discharge Diagnoses   Pyuria secondary to pyelonephritis in a transplant patient  History of remote DDKT  Nonoliguric VERONIQUE  Hypertensive urgency vs emergency  Hyponatremia      Follow-ups Needed After Discharge   Follow up with PCP Dr Baird within next 1-2 weeks for post-hospitalization follow up and to repeat BMP.    Unresulted Labs Ordered in the Past 30 Days of this Admission     Date and Time Order Name Status Description    8/26/2021  1:00 AM Cyclosporine Level In process       These results will be followed up by Dr Baird    Discharge Disposition   Discharged to home  Condition at discharge: Good    Hospital Course   aCnde Baird is a 86 year old female with pmhx of kidney transplant admitted on 8/24/2021 for concerns of pyelonephritis and hypertensive urgency/emergency. The following problems were addressed during her hospitalization:     # Pyuria with c/f pyelonephritis  She was recommended to come to the emergency department by her outpatient physician after noting a rising creatinine, UA with pyuria, and outpatient CT scan showing evidence of possible hydronephrosis and stranding of the transplant kidney. She reported several weeks of generalized malaise, decreased appetite, and nausea/vomiting however no fevers, chills, abdominal pain, dysuria, hematuria or additional urinary symptoms. She was initially instructed to follow up with urology who referred her to the ED. Her UA in the ED showed 38 wbcs, few bacteria, moderate leuk esterase and few bacteria. Transplant renal US showed mild hydroureter but no hydronephrosis. She was admitted and started on Ceftriaxone. Urine cultures remain negative for growth. Of note she has a history of E.coli and Klebsiella UTIs in the past with  fluoroquinolone resistance. On day of discharge she reports improvement in her symptoms. No difficulty with urination. No fevers or chills. Improved appetite. She should continue to follow up with nephrology and urology as outpatient which have already been scheduled.   - Continue Augmentin twice a day for 10 days.   - Rx for Zofran for nausea   - Encourage good hydration  - F/u Urine Culture  - F/u with Nephrology and Urology as already scheduled  - Follow up with PCP Dr Baird within the next 1-2 weeks to repeat laboratory workup including BMP     # DDKT  - Etiology: Chronic glomerulonephritis (GN)  - Transplant date: 10/17/1987 ([facility] Kidney)  - Baseline Creatinine: ~ 1  - Proteinuria: Not checked recently  - Date DSA Last Checked: Not Known        - Latest DSA: Not checked recently due to time from transplant  - BK Viremia: Not checked recently due to time from transplant  - Kidney Tx Biopsy: No  - Continued prior to admission azathioprine and cyclosporine  - Cyclosporin level still pending on day of discharge. Needs to be followed up by PCP.       # Nonoliguric VERONIQUE  Cr 1.67 on admission up from baseline around 1-1.1. Suspect prerenal in setting of poor oral intake over last several weeks. Likely infectious component as well. No obvious obstruction on transplant US however did reveal borderline elevated arcuate artery resistive indices, which is nonspecific and can be seen with medical renal disease or graft dysfunction/rejection. Transplant on board. Repeated cyclosporin levels still pending. Her renal function is improving with rehydration. Her creatinine is 1.46 on day of discharge. She will follow up with nephrology as outpatient.   - Encourage PO intake  - Follow up with nephrology outpatient      # Hypertensive urgency vs emergency   Systolic up to 196 while in ED with evidence of VERONIQUE c/f hypertensive emergency. Improved significantly after 1x dose of hydralazine though systolic Bps remained elevated in  150-160ss. Goal is <140/90. Outpatient nephrology was planning to start low dose amlodipine. This was initiated during admission.   - Amlodipine 2.5mg daily    # Hyponatremia  Likely related to dehydration. Improving on day of discharge.     Consultations This Hospital Stay   NEPHROLOGY KIDNEY/PANCREAS TRANSPLANT ADULT IP CONSULT  PHYSICAL THERAPY ADULT IP CONSULT  OCCUPATIONAL THERAPY ADULT IP CONSULT  CARE MANAGEMENT / SOCIAL WORK IP CONSULT    Code Status   No CPR- Do NOT Intubate       The patient was discussed with Dr. Masoud Alejandro, PGY-1  [facility] [address] [address]  Phone: [phone]  ______________________________________________________________________    Physical Exam   Vital Signs: Temp: 99  F (37.2  C) Temp src: Temporal BP: (!) 165/81 Pulse: 91   Resp: 16 SpO2: 93 % O2 Device: None (Room air)    Weight: 129 lbs 13.62 oz  General Appearance: Pleasant elderly patient in NAD  Respiratory: CTA without crackles or wheezing. No increased WOB  Cardiovascular: RRR, no m/r/g  GI: Normoactive bowel sounds. Soft, nontender, nondistended  MSK: extremities normal, no gross deformities. Moves all extremities spontaneously  Skin: No concerning rashes or lesions      Primary Care Physician   [facility]    Discharge Orders      Reason for your hospital stay    Dear Cande Baird    Your were hospitalized at [facility] with nausea, high blood pressure, and increased creatine, suggesting dehydration and possible kidney.urinary tract infection, and treated with fluids and antibiotics.  Over your hospitalization your symptoms were stable and today you are ready to be discharged.      We are suggesting the following medication changes:  1. Blood pressure: we started amlodipine, 2.5 mg every morning (Dr. Díaz had just prescribed this).  Please follow up with Dr. Díaz  2. Nausea: zofran, as needed.  3.  Antibiotic: augmentin, twice a day, for 10 days     Please set up an appointment with:  Dr. Díaz next available    It was a pleasure meeting with you today. Thank you for allowing me and my team the privilege of caring for you today. You are the reason we are here, and I truly hope we provided you with the excellent service you deserve. Please let us know if there is anything else we can do for you so that we can be sure you are leaving completely satisfied with your care experience.       Take care!  Masoud Gr MD  Department of Medicine  [facility]     Activity    Your activity upon discharge: activity as tolerated     Diet    Follow this diet upon discharge: Orders Placed This Encounter      Combination Diet Regular Diet Adult       Significant Results and Procedures   Results for orders placed or performed during the hospital encounter of 08/24/21   US Renal Transplant    Narrative    EXAMINATION: US RENAL TRANSPLANT, 8/24/2021 4:27 PM     COMPARISON: 8/18/2021    HISTORY: VERONIQUE    TECHNIQUE: Grey-scale, color Doppler and spectral flow analysis.    FINDINGS:  The transplant kidney is located in the right lower quadrant, and  measures 12.3 cm. Parenchyma is of normal thickness and echogenicity.  Simple renal cysts measuring up to 3.1 cm. No focal lesions. No  hydronephrosis. No perinephric fluid collection.    Renal artery flow:   77 cm/s peak systolic at hilum.  109 cm/s peak systolic at anastomosis.  Arcuate artery resistive indices (upper to lower): 0.77, 0.77, 0.74    Renal Vein Flow:  26 cm/s at hilum.   65 cm/s at anastomosis.    Iliac artery flow:  76 cm/s peak systolic above anastomosis.  70 cm/s peak systolic below anastomosis.    Iliac vein flow:  Patent above and below the anastomosis.        Impression    IMPRESSION:   1. Mild hydroureter without hydronephrosis. Scattered simple renal  cysts. Otherwise normal grayscale appearance of the renal transplant.   2. Patent renal transplant  Doppler evaluation. Borderline elevated  arcuate artery resistive indices, which is nonspecific and can be seen  with medical renal disease or graft dysfunction/rejection.          HARPAL GROVER MD         SYSTEM ID:  [id number]       Discharge Medications   Current Discharge Medication List      START taking these medications    Details   amLODIPine (NORVASC) 2.5 MG tablet Take 1 tablet (2.5 mg) by mouth daily  Qty: 30 tablet, Refills: 0    Associated Diagnoses: Essential hypertension      amoxicillin-clavulanate (AUGMENTIN) 875-125 MG tablet Take 1 tablet by mouth 2 times daily  Qty: 20 tablet, Refills: 0    Associated Diagnoses: Pyelonephritis      ondansetron (ZOFRAN-ODT) 4 MG ODT tab Take 1 tablet (4 mg) by mouth every 6 hours as needed for nausea or vomiting  Qty: 60 tablet, Refills: 0    Associated Diagnoses: Pyelonephritis         CONTINUE these medications which have NOT CHANGED    Details   aspirin 81 MG tablet Take by mouth daily  Qty: 30 tablet    Associated Diagnoses: S/P kidney transplant      azaTHIOprine (IMURAN) 50 MG tablet Take 1 tablet (50 mg) by mouth daily  Qty: 90 tablet, Refills: 0    Comments: Labs needed  Associated Diagnoses: Kidney transplant recipient; Long-term use of immunosuppressant medication      Cholecalciferol (VITAMIN D3 PO) Take 2,000 Units by mouth daily      cycloSPORINE modified (GENERIC EQUIVALENT) 25 MG capsule Take 3 capsules (75 mg) by mouth      GLUCOSAMINE SULFATE PO Take 1,000 mg by mouth 2 times daily Takes 1000mg in the morning and 1000mg in the evening      saccharomyces boulardii (FLORASTOR) 250 MG capsule Take 250 mg by mouth daily         STOP taking these medications       ciprofloxacin (CIPRO) 500 MG tablet Comments:   Reason for Stopping:         ciprofloxacin (CIPRO) 500 MG tablet Comments:   Reason for Stopping:             Allergies   No Known Allergies     Internal Medicine Staff Addendum  Date of Service: 8/26/2021  I have seen and examined Cande Baird  with the resident team, reviewed the data and discussed the plan of care with the patient and the care team on P&FC Rounds.  I agree with the above documentation.  I discussed pt's care with bedside RN, case management/social work today.  I personally reviewed imaging, labs, medications and past 24 hr notes.    45 minutes spent in discharge, including >50% in counseling and coordination of care, medication review and plan of care recommended on follow up. Questions were answered.   The patient's PCP was contacted electronically at the time of discharge, so as to bridge from hospital to outpatient care.   It was our pleasure to care for Cande Baird during Cande Baird's hospitalization. Please do not hesitate to contact me should there be questions regarding the hospital course or discharge plan.      Masoud Gr MD  Internal Medicine/Pediatrics Hospitalist & Staff Physician   of Internal Medicine and Pediatrics  [facility]  Pager: [phone]

## 2021-08-26 NOTE — TELEPHONE ENCOUNTER
Cyclosporine = 39  (8/26/21)  Goal 50-75  Current CSA dose 75 mg BID    Prev level also low at 41 (8/24/21)    PLAN:   Call Cande Baird and confirm this was a good 12-hour trough. Verify dose 75 mg BID.   Confirm no new medications or illness (laine. Diarrhea).   If good trough, increase dose to 100 mg BID.   Recheck level in 2 weeks and make sure it is a good trough to avoid additional lab draws.

## 2021-08-26 NOTE — TELEPHONE ENCOUNTER
Call placed to patient. Patient state that she follow with Dr. ISAMAR Díaz at [facility] for transplant IS management. She will discuss this level with him

## 2021-08-26 NOTE — PLAN OF CARE
Discharge home with daughter to assisted living. See care coordination notes. Reviewed discharge meds, instructions and plan with patient and her daughter. Will plan for labs tomorrow and appointments with primary provider as indicated. Stable for discharge.

## 2021-08-26 NOTE — PLAN OF CARE
VSS overnight. No pain reported to this nurse. PIV saline locked. Confused at times. Slept through the night. Up to commode with assist. Voided x1. On room air.

## 2021-08-26 NOTE — TELEPHONE ENCOUNTER
First available appointment is a video appointment on 9-14-21.  Daughter states patient does not have access to a computer or phone with a camera and speaker.    She will talk with patient to see if she would be willing to see a different provider.  Daughter will call back.

## 2021-08-31 NOTE — TELEPHONE ENCOUNTER
Hospital discharge summary and provider notes faxed by  on 8/31/21 for [facility] appt on 9/2/21. Fx number provided [phone].    Marlen Little, RN, BSN  Solid Organ Transplant, Post Kidney and Pancreas  Transplant Care Coordinator  [phone]

## 2021-08-31 NOTE — PROGRESS NOTES
(N10) Acute pyelonephritis  (primary encounter diagnosis)  Comment:   Recent admission reviewed.  Patient still feels weak and somewhat discouraged as to her present situation.  Plan: UA with Microscopic - lab collect, Urine         Culture Aerobic Bacterial - lab collect, Urine         Microscopic Exam        Continue Augmentin until gone.  Recheck a urine culture in 2 weeks.  Could do so at her primary care appointment.      (Z94.0) S/P kidney transplant  Comment:   Now 36 years with transplanted kidney.  Plan:       (R79.89) Increase in creatinine  Comment:   Creatinine now up to 1.7.  Usually ran morning of 1.2.  Plan:   We will continue to monitor.  Treat hypertension when necessary.  Just got her new medication.      (D84.9) Immunosuppressed status (H)  Comment:   A factor in recovery.  Plan:         Mark Castellon is a 86 year old who presents for the following health issues  accompanied by her daughter:    [facility]       Hospital Follow-up Visit:    Hospital/Nursing Home/IP Rehab Facility: [facility]  Date of Admission: 8/24/21  Date of Discharge: 8/26/212  Reason(s) for Admission: Pyelonephritis      Was your hospitalization related to COVID-19? No   Problems taking medications regularly:  None  Medication changes since discharge: Was given Zofran, Amlodipine, Amoxicillin  Problems adhering to non-medication therapy:  None    Summary of hospitalization:  [facility] discharge summary reviewed  Diagnostic Tests/Treatments reviewed.  Follow up needed: urine, lab  Other Healthcare Providers Involved in Patient s Care:         Dr Baird, Nephrology  Update since discharge: stable. Post Discharge Medication Reconciliation: discharge medications reconciled, continue medications without change.  Plan of care communicated with patient and daughter            Patient had chills and weakness possibly secondary to pyelonephritis.  She is not having  "congestion or shortness of breath.  No vomiting or abdominal pain.  No fever.    She is taking Augmentin twice daily for 10 days.    Patient is kind of discouraged to being tired out and knowing her creatinine is rising.  She has required a walker for ambulation during the last 2 months.  Her  is talking about not wanting to live too much longer.  Patient does not wish to be on dialysis.    Review of Systems     No fever or chills.  No shortness of breath.  No chest pain.  No abdominal pain.  No vomiting.      Objective    BP (!) 158/89 (BP Location: Left arm, Patient Position: Sitting, Cuff Size: Adult Regular)   Pulse 110   Temp 97.1  F (36.2  C) (Tympanic)   Resp 14   Ht 1.6 m (5' 3\")   Wt 59.6 kg (131 lb 8 oz)   SpO2 95%   BMI 23.29 kg/m    Body mass index is 23.29 kg/m .  Physical Exam     Thin woman who appears of stated age.  HEENT unremarkable.  Neck without masses.  Lungs are clear.  Cardiac regular, no murmur  Abdomen nontender  Extremities without edema.      "

## 2021-09-01 NOTE — TELEPHONE ENCOUNTER
Patient is calling to ask if Dr Baird will place an order for home therapy to help with her weakness. She lives in an assisted living and we can fax the order to [facility] [phone]

## 2021-09-02 NOTE — TELEPHONE ENCOUNTER
This RN called patient to inform her primary care provider is out of clinic until 9-13-21    Patient states she does not want to pursue home care at this time.  States she will follow up as needed.

## 2021-09-17 PROBLEM — R53.1 GENERALIZED WEAKNESS: Status: ACTIVE | Noted: 2021-01-01

## 2021-09-17 PROBLEM — I48.91 ATRIAL FIBRILLATION WITH RVR (H): Status: ACTIVE | Noted: 2021-01-01

## 2021-09-18 NOTE — PROGRESS NOTES
"   09/18/21 1700   Quick Adds   Type of Visit Initial PT Evaluation       Present no   Language English   General Information   Onset of Illness/Injury or Date of Surgery 09/17/21   Referring Physician Flex Rausch MD   Patient/Family Therapy Goals Statement (PT) Feel better, go home   Pertinent History of Current Problem (include personal factors and/or comorbidities that impact the POC) 87 yo female who is on chronic immunosuppression for transplanted kidney who was admitted for treatment of pyelonephritis. Given evidence of hydronephrosis and stranding on outpatient CT w/o contrast, patient's general malaise and pyuria on UA, pyelonephritis suspected vs VERONIQUE (intrinsic). Of note, she has a history of E.coli and Klebsiella UTI with prior resistances to ampicillin, ciprofloxacin and cefazolin. She is being treated with IV ceftriaxone pending urine culture and sensitivities. 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    Cognition   Orientation Status (Cognition) oriented x 4   Affect/Mental Status (Cognition) WFL;anxious   Follows Commands (Cognition) WFL   Pain Assessment   Patient Currently in Pain No   Posture    Posture Forward head position;Protracted shoulders;Kyphosis   Range of Motion (ROM)   ROM Comment WFL B LE   Strength   Strength Comments Grossly deconditioned, grossly 4/5 B LE with fn mobility    Bed Mobility   Comment (Bed Mobility) CG-Hawa   Transfers   Transfer Safety Comments SBA   Gait/Stairs (Locomotion)   Assistive Device (Gait) walker, front-wheeled   Distance in Feet (Required for LE Total Joints) 55' x 2   Pattern (Gait) step-through   Deviations/Abnormal Patterns (Gait) alirio decreased;base of support, narrow   Comment (Gait/Stairs) SB/CGA, complains of feeling weak, \"I can't seem to be able to walk without this walker anymore.\"    Balance   Balance Comments Needs B UE " support   Sensory Examination   Sensory Perception patient reports no sensory changes   Coordination   Coordination no deficits were identified   Muscle Tone   Muscle Tone no deficits were identified   Clinical Impression   Criteria for Skilled Therapeutic Intervention yes, treatment indicated   PT Diagnosis (PT) Impaired fn mobility    Influenced by the following impairments Strength, balance, cardiovasc endurance   Functional limitations due to impairments Bed mob, transfers, ambulation   Clinical Presentation Evolving/Changing   Clinical Presentation Rationale Multiple affecting comorbidities, assessment incl strength, balance, fn mobility    Clinical Decision Making (Complexity) moderate complexity   Therapy Frequency (PT) Daily   Predicted Duration of Therapy Intervention (days/wks) 3 days   Planned Therapy Interventions (PT) balance training;bed mobility training;gait training;postural re-education;strengthening;home exercise program   PT Discharge Planning    PT Discharge Recommendation (DC Rec) home with home care physical therapy;home with assist   PT Rationale for DC Rec Presents close to baseline. No overt LOB, amb with 2WW and SBA. Pt endorses she lives at [facility] with . Endorses she has assistance with meals, cleaning. Recommend return to [facility] with increasing services - frequent checks, home physical therapy to reduce risk of falling and re-hospitalization.    PT Brief overview of current status  SBA with 2WW   Total Evaluation Time   Total Evaluation Time (Minutes) 10   Coping Strategies   Trust Relationship/Rapport care explained;choices provided;emotional support provided;empathic listening provided;questions answered;questions encouraged;reassurance provided;thoughts/feelings acknowledged

## 2021-09-18 NOTE — H&P
[facility]EL[facility]    History and Physical - Hospitalist Service       Date of Admission:  9/17/2021    Assessment & Plan      Cande Baird is a 86 year old female admitted on 9/17/2021. 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  She presented to emergency room with palpitations.  Found to be in atrial fibrillation with rapid ventricular response.    1.  Atrial fibrillation with rapid ventricular response.  Was started on IV diltiazem in emergency room.  Heart rate under better control at this time.  -Start IV heparin drip.  -Hold prior to admission amlodipine.  -Continue IV diltiazem.  -Monitor on telemetry.  -Check echocardiogram.  -Cardiology consult for new onset A. Fib.  -SARS-CoV-2 PCR sent from emergency room with results pending.    2.  Previous kidney transplant.  -Restart Imuran.  -Restart Gengraf.    3.  Hypertension.  Now with A. fib with RVR.  Started on IV diltiazem in ER.  -Hold amlodipine to allow titration of other medications.  -IV diltiazem as noted above.    4.  Recent urinary tract infection.  Had been treated with a course of antibiotics finished more than a week ago.  -Urinalysis ordered in ER with results pending.    5.  Acute kidney injury.  Does appear that baseline creatinine is around 1.  Creatinine today is 1.5.  -Start continuous IV fluids.  -Avoid nephrotoxins as able.  -Recheck metabolic panel in the morning.       Diet:  Cardiac diet  DVT Prophylaxis: Heparin   Benjamin Catheter: Not present  Central Lines: None  Code Status:  Full code.    Clinically Significant Risk Factors Present on Admission              # Platelet Defect: home medication list includes an antiplatelet medication        DO MELISA Dasilva [facility]  Securely message with the Vocera Web Console (learn more here)  Text page via [employer]  Paging/Directory      ______________________________________________________________________    Chief Complaint   Palpitations.    History is obtained from the patient    History of Present Illness   Cande Baird is a 86 year old female who has a past medical history significant for arthritis, hypertension, and end-stage kidney disease status post kidney transplant in 1987.  She presented to emergency room with palpitations.  She states that she has been noticing palpitations at times over the past 1 year.  It does seem that palpitations have been getting more frequent recently.  She especially notices that when she first stands up from a lying down position she gets palpitations.  She also occasionally gets dizzy and lightheaded.  She feels weak compared to usual.  Symptoms were so bad on 9/17 that she presented to emergency room for further evaluation.  She was found to be in atrial fibrillation with rapid ventricular response in the ER.  She states that she does not remember ever being told that she has atrial fibrillation previously.  No known heart problems otherwise.  She has been having occasional nausea over the last few weeks.  Was recently treated for urinary tract infection.  She had apparently told the ER provider that she had felt like she might have a fever at home.  She tells me that she has not been having any fevers.  Denies cough or shortness of breath.  No chest pain.  No other acute complaints.  She is feeling better after medications given in the ER.    Review of Systems    The 10 point Review of Systems is negative other than noted in the HPI     Past Medical History    I have reviewed this patient's medical history and updated it with pertinent information if needed.   Past Medical History:   Diagnosis Date     Arthritis      Atrial fibrillation with RVR (H) 9/17/2021     Glomerulonephritis      Renal transplant recipient 1987     Skin cancer     does not see dermatologist       Past Surgical  History   I have reviewed this patient's surgical history and updated it with pertinent information if needed.  Past Surgical History:   Procedure Laterality Date     BIOPSY  6 months    Squeamish cell-removed     COLONOSCOPY  5/15/2013     LAPAROSCOPIC CYSTECTOMY OVARIAN (BENIGN)       TRANSPLANT  1987    Kidney       Social History   I have reviewed this patient's social history and updated it with pertinent information if needed.  Social History     Tobacco Use     Smoking status: Former Smoker     Packs/day: 0.00     Years: 0.00     Pack years: 0.00     Types: Cigarettes     Quit date: 1988     Years since quittin.3     Smokeless tobacco: Former User     Quit date: 10/17/1987   Vaping Use     Vaping Use: Never used   Substance Use Topics     Alcohol use: Yes     Alcohol/week: 0.0 standard drinks     Comment: 1 or 2 glasses of wine a week     Drug use: No       Family History   I have reviewed this patient's family history and updated it with pertinent information if needed.  Family History   Problem Relation Age of Onset     Diabetes Mother 60     Peptic Ulcer Disease Father      Diabetes Sister      Rheumatoid Arthritis Sister      Diabetes Sister        Prior to Admission Medications   Prior to Admission Medications   Prescriptions Last Dose Informant Patient Reported? Taking?   GENGRAF (BRAND) 25 MG CAPSULE 2021 at x1  Yes Yes   Sig: Take 25 mg by mouth 2 times daily At 1000 and 2200   GLUCOSAMINE SULFATE PO Past Month at Unknown time  Yes Yes   Sig: Take 1,000 mg by mouth 2 times daily Takes 1000mg in the morning and 1000mg in the evening   Vitamin D, Cholecalciferol, 10 MCG (400 UNIT) CHEW Past Week at Unknown time  Yes Yes   Sig: Take 800 mg by mouth daily   amLODIPine (NORVASC) 2.5 MG tablet Not Taking at Unknown time  No No   Sig: Take 1 tablet (2.5 mg) by mouth daily   Patient not taking: Reported on 2021   aspirin 81 MG tablet 2021 at hs  Yes Yes   Sig: Take by  mouth daily   azaTHIOprine (IMURAN) 50 MG tablet 9/16/2021 at hs  No Yes   Sig: Take 1 tablet (50 mg) by mouth daily   ondansetron (ZOFRAN-ODT) 4 MG ODT tab   No Yes   Sig: Take 1 tablet (4 mg) by mouth every 6 hours as needed for nausea or vomiting      Facility-Administered Medications: None     Allergies   No Known Allergies    Physical Exam   Vital Signs: Temp: 97.6  F (36.4  C) Temp src: Oral BP: 137/87 Pulse: 93   Resp: 20 SpO2: 93 % O2 Device: None (Room air)    Weight: 0 lbs 0 oz    Gen:  NAD, A&Ox3.  Eyes:  PERRL, sclera anicteric.  OP:  MMM, no lesions.  Neck:  Supple.  CV:  Irregular, +1/6 murmur.  Lung:  CTA b/l, normal effort.  Ab:  +BS, soft.  Skin:  Warm, dry to touch.  No rash.  Ext:  No pitting edema LE b/l.      Data   Data reviewed today: I reviewed all medications, new labs and imaging results over the last 24 hours.    Recent Labs   Lab 09/17/21  2124   WBC 8.3   HGB 12.6   MCV 94      INR 0.99      POTASSIUM 3.7   CHLORIDE 100   CO2 28   BUN 20   CR 1.52*   ANIONGAP 7   LETI 9.1   *   ALBUMIN 3.1*   PROTTOTAL 6.9   BILITOTAL 0.5   ALKPHOS 116   ALT 33   AST 39   TROPONIN <0.015

## 2021-09-18 NOTE — ED NOTES
Bed: ED26  Expected date: 9/17/21  Expected time: 8:49 PM  Means of arrival: Ambulance  Comments:  BSV 1 Racing heart

## 2021-09-18 NOTE — ED PROVIDER NOTES
History   Chief Complaint:  Generalized Weakness     The history is provided by the patient.      Cande Baird is a 86 year old female with history of kidney transplant who presents for evaluation of weakness. The history is somewhat vague. The patient was admitted to the [facility] on 08/24/21 for concerns of hydronephrosis and standing of her transplant kidney. She stayed for 2 days and was discharged in stable condition with a course of Augmentin. She finished this course as instructed. Patient comes in today because she has been dealing with generalized weakness and malaise problems for several months and today it was very severe and she felt that she couldn't walk or make her own food. She states that tonight she had some chest fluttering sensations and nausea as well with this. Her chest fluttering sensation was quite prominent as well. She notes intermittent subjective fevers over the last few weeks as well. She denies any other known symptoms at this time. She also noted possibly several weeks of fluttering as well.    Review of Systems   Constitutional: Positive for fever (Subjective).   Cardiovascular: Positive for palpitations.   Gastrointestinal: Positive for nausea.   Neurological: Positive for weakness.   All other systems reviewed and are negative.        Allergies:  The patient has no known allergies.     Medications:  Amlodipine  Aspirin 81 mg  Imuran   Vitamin D3  Cyclosporine  Glucosamine  Florastor    Past Medical History:    Arthritis   Glomerulonephritis   Renal transplant recipient   Skin cancer   Anxiety  Secondary hyperparathyroidism of renal origin  Immunosuppressed status     Past Surgical History:    Biopsy Skin  Colonoscopy  Cystectomy Ovarian  Kidney Transplant     Family History:    Diabetes  Peptic Ulcer Disease  Diabetes x2  Rheumatoid Arthritis    Social History:  The patient presents to the ED alone.    Physical Exam     Patient Vitals for the past 24 hrs:    BP Temp Temp src Pulse Resp SpO2   09/17/21 2309 -- -- -- 77 -- 93 %   09/17/21 2308 -- -- -- 90 -- 93 %   09/17/21 2307 -- -- -- 93 -- 94 %   09/17/21 2306 -- -- -- 82 -- 94 %   09/17/21 2305 -- -- -- 85 -- 93 %   09/17/21 2304 -- -- -- 84 -- 94 %   09/17/21 2303 -- -- -- 81 -- 93 %   09/17/21 2302 -- -- -- 80 -- 93 %   09/17/21 2301 -- -- -- 89 -- 93 %   09/17/21 2300 135/71 -- -- 79 -- 93 %   09/17/21 2259 -- -- -- 81 -- 94 %   09/17/21 2258 -- -- -- 85 -- 94 %   09/17/21 2257 -- -- -- 87 -- 94 %   09/17/21 2256 -- -- -- 84 -- 93 %   09/17/21 2255 -- -- -- 77 -- 94 %   09/17/21 2254 -- -- -- 80 -- 94 %   09/17/21 2253 -- -- -- 89 -- 95 %   09/17/21 2252 -- -- -- 77 -- 94 %   09/17/21 2251 -- -- -- 82 -- 94 %   09/17/21 2250 -- -- -- 90 -- 96 %   09/17/21 2249 -- -- -- 81 -- 94 %   09/17/21 2248 -- -- -- 81 -- 94 %   09/17/21 2247 -- -- -- 79 -- 95 %   09/17/21 2246 -- -- -- 80 -- 96 %   09/17/21 2245 (!) 148/82 -- -- 85 -- 95 %   09/17/21 2244 -- -- -- 87 -- 96 %   09/17/21 2243 -- -- -- 89 -- 96 %   09/17/21 2241 -- -- -- 89 -- 93 %   09/17/21 2240 -- -- -- 89 -- 94 %   09/17/21 2239 -- -- -- 81 -- 94 %   09/17/21 2238 -- -- -- 78 -- 95 %   09/17/21 2237 -- -- -- 83 -- 94 %   09/17/21 2236 -- -- -- 84 -- 94 %   09/17/21 2235 -- -- -- 84 -- 94 %   09/17/21 2234 -- -- -- 90 -- 96 %   09/17/21 2233 -- -- -- 92 -- 95 %   09/17/21 2232 -- -- -- 92 -- 95 %   09/17/21 2231 -- -- -- 112 -- 96 %   09/17/21 2230 134/89 -- -- 103 -- 96 %   09/17/21 2229 -- -- -- 104 -- 97 %   09/17/21 2228 -- -- -- 110 -- 97 %   09/17/21 2227 -- -- -- 110 -- 96 %   09/17/21 2226 -- -- -- 105 -- 97 %   09/17/21 2225 -- -- -- 105 -- 96 %   09/17/21 2224 -- -- -- 101 -- 96 %   09/17/21 2223 -- -- -- 112 -- 95 %   09/17/21 2222 -- -- -- 105 -- 95 %   09/17/21 2221 -- -- -- 99 -- 90 %   09/17/21 2155 -- -- -- 110 -- 96 %   09/17/21 2150 -- -- -- 103 -- 96 %   09/17/21 2145 (!) 165/100 -- -- 103 -- 96 %   09/17/21 2140 -- -- -- 104 -- 96 %    09/17/21 2135 -- -- -- 106 -- 94 %   09/17/21 2130 (!) 162/104 -- -- 111 -- 96 %   09/17/21 2125 -- -- -- 100 -- 95 %   09/17/21 2120 -- -- -- 105 -- 95 %   09/17/21 2115 (!) 159/105 -- -- 101 -- 94 %   09/17/21 2110 -- -- -- 107 -- 96 %   09/17/21 2106 (!) 170/128 97.6  F (36.4  C) Oral 105 20 96 %   09/17/21 2105 (!) 170/128 -- -- 104 -- 95 %       Physical Exam  Constitutional:       Appearance: She is well-developed.   HENT:      Right Ear: External ear normal.      Left Ear: External ear normal.      Mouth/Throat:      Mouth: Mucous membranes are moist.      Pharynx: Oropharynx is clear. No oropharyngeal exudate or posterior oropharyngeal erythema.   Eyes:      General: No scleral icterus.     Conjunctiva/sclera: Conjunctivae normal.      Pupils: Pupils are equal, round, and reactive to light.   Neck:      Vascular: No JVD.   Cardiovascular:      Rate and Rhythm: Tachycardia present. Rhythm irregular.      Heart sounds: Normal heart sounds. No murmur heard.   No friction rub. No gallop.    Pulmonary:      Effort: Pulmonary effort is normal. No respiratory distress.      Breath sounds: Normal breath sounds. No wheezing or rales.   Abdominal:      General: Bowel sounds are normal. There is no distension.      Palpations: Abdomen is soft. There is no mass.      Tenderness: There is no abdominal tenderness.   Musculoskeletal:         General: Normal range of motion.      Cervical back: Normal range of motion and neck supple.   Skin:     General: Skin is warm and dry.      Capillary Refill: Capillary refill takes less than 2 seconds.      Findings: No rash.   Neurological:      General: No focal deficit present.      Mental Status: She is alert.      Cranial Nerves: No cranial nerve deficit.      Sensory: No sensory deficit.         Emergency Department Course     ECG:  Indication: Weakness  Completed at 2138.  Read at 2138.   Undetermined rhythm. Incomplete right bundle branch block. Borderline ECG.   Rate 103  bpm. MN interval *. QRS duration 100. QT/QTc 296/387. P-R-T axes * 50 25.    Imaging:  XR Chest 2 Views:  PA and lateral views of the chest were obtained. Stable cardiomediastinal silhouette. Atherosclerotic vascular calcification of the aortic knob. No suspicious focal pulmonary opacities. No significant pleural effusion or pneumothorax. Report per radiology     Laboratory:  CBC: WBC 8.3, HGB 12.6,     INR: 0.99    CMP: Creat 1.52 (H), Glucose 109 (H), Albumin 3.1 (L), GFR 31 (L), o/w WNL    Lactic Acid (resulted 2134): 1.2    Troponin: <0.015    TSH w/ Free T4 Reflex: 2.2    UA: Pending     Emergency Department Course:  Reviewed:  I reviewed nursing notes, vitals, past medical history and care everywhere    Assessments:  2105 I performed a physical exam of the patient. Findings as above.     2246 Patient rechecked.     Consults:   2344 I spoke with Dr. Horner of the Hospitalist service regarding patient's presentation, findings, and plan of care.    Interventions:  2123 NaCl 0.9% BOLUS 1000 mL IV  2221 Cardizem 10 mg IV  2300 Zofran 4 mg IV    Disposition:  The patient was admitted to the hospital under the care of Dr. Horner.     Impression & Plan   Medical Decision Making:  Cande Baird is a 86 year old female who presents with generalized weakness and was found to be in atrial fibrillation with RVR. Patient is unclear in terms of when this all started but it sounds like it may have been progressive. She has described fluttering as she has felt in the past. Her RVR resolved after a dose of diltiazem. She has not needed to be on a diltiazem drip. She seems to be comfortable here. There is no focal concern with her weakness. She was recently treated for pyelonephritis. We are still waiting on a urine to be obtained. She is 34 years s/p kidney transplant and seems to do well. Her creatinine is at her baseline. She has no abdominal pain and can be treated her and monitored. She is admitted to   in a telemetry bed for further evaluation.      Diagnosis:    ICD-10-CM    1. Atrial fibrillation with RVR (H)  I48.91    2. Generalized weakness  R53.1      Scribe Disclosure:  I, Tin Norris, am serving as a scribe at 9:05 PM on 9/17/2021 to document services personally performed by Gonzalo Le MD based on my observations and the provider's statements to me.     [facility]         Gonzalo Le MD  09/18/21 0004

## 2021-09-18 NOTE — PLAN OF CARE
"PRIMARY DIAGNOSIS: \"GENERIC\" NURSING  OUTPATIENT/OBSERVATION GOALS TO BE MET BEFORE DISCHARGE:  ADLs back to baseline: No    Activity and level of assistance: Assist x1/ gait belt. Commode d/t dizziness    Pain status: Pain free.    Return to near baseline physical activity: No     Discharge Planner Nurse   Safe discharge environment identified: No  Barriers to discharge: Yes       Entered by: Rena Armstrong 09/18/2021 5:29 AM     Please review provider order for any additional goals.   Nurse to notify provider when observation goals have been met and patient is ready for discharge.    Admitted overnight for new onset Afib RVR. A&O. VSS. Denies pain. Tele Afib CVR- Diltiazem gtt infusing @ 5 ml/hr. Heparin gtt @ 650 units/hr. LR infusing. Assist x1/gait belt to bedside commode d/t c/o dizziness w/ activity. Echo ordered. Cardiology consulted.  "

## 2021-09-18 NOTE — PROVIDER NOTIFICATION
Dr. Rausch paged:pt and daughter states pt has been falling at home and feels she needs increased services. can we consult PT and SW?

## 2021-09-18 NOTE — ED NOTES
Pt took one capsule of her home dose of 25 mg cyclosporine for kidney transplant. Okayed with  MD Le

## 2021-09-18 NOTE — CONSULTS
"Consult Date: 09/18/2021    REFERRAL SOURCE:  Alfa Horner DO, Hospitalist Service.    REASON FOR REFERRAL:  New diagnosis of atrial fibrillation with rapid ventricular rates.    HISTORY OF PRESENT ILLNESS:    Cande Baird is an 86-year-old, slim, and frail-appearing,  lady, who is known to have benign essential hypertension, stage IIIB chronic kidney disease, status post kidney transplant in 1997 (baseline creatinine 1.4 to 1.5, estimated GFR 30, on immunosuppressive therapy with azathioprine and Gengraf, resides in assisted living with her .    The patient reports palpitations that are intermittent and feel like a \"rapid heartbeat\" for the last 6 months.  She was recently hospitalized at the [facility] from 8/24 to 8/26 with pyuria secondary to pyelonephritis, nonoliguric acute kidney injury, and hypertensive urgency.  She was started on amlodipine 2.5 mg daily for her hypertension, and upon discharge, a heart monitor was placed.    I personally reviewed her 5-day Zio patch monitor from a month ago.  This actually shows paroxysmal atrial fibrillation with periods of rapid ventricular rates, with a burden of approximately 22% and longest period being 20 hours.  Intermittently, heart rate went up to the 180s, and her symptoms correlated with both the atrial fibrillation and SVT.  The patient tells me that nobody had reached out to her with the results of this.    She acutely presented to the Emergency Room yesterday due to generalized weakness and tachy palpitations, with a presenting blood pressure of 174/128 mmHg, a pulse in the 120s, and ECG showed rapid atrial fibrillation of 100 to 120 BPM.  She was appropriately started on IV diltiazem drip and her heart rates improved and she remains in atrial fibrillation this morning.  I repeated an ECG when I visited with her and that shows AFib, with ventricular rate of 70 to 80 BPM.  No ischemic changes.    I personally reviewed her " echocardiogram images from yesterday.  This shows normal left ventricular systolic function.  LVEF 60% to 65%, normal right ventricular size and systolic function, normal atrial size (LA volume index 22 mL/m2), no significant valve disease.  She has trace tricuspid valve regurgitation and trace mitral valve regurgitation and no significant aortic valve disease.    Labs:  She is mildly hypokalemic with a potassium of 3.5, creatinine is 1.4, estimated GFR 34, serum sodium normal at 137, normal liver enzymes, troponin negative, normal TSH, normal CBC.    PHYSICAL EXAMINATION:  GENERAL:  She is chatty, frail appearing, mentally alert and oriented.  Appears worried.  NECK:  No thyromegaly.  RESPIRATORY:  A few bibasilar rales bilaterally.  CARDIOVASCULAR:  Normal JVP.  Irregularly irregular heart sounds. She does have a well audible systolic murmur that is present all over the pericardium.  No pericardial friction rub.  GI:  Soft and nontender.  EXTREMITIES:  No edema.    DIAGNOSES:     1. New diagnosis of atrial fibrillation with rapid ventricular rate.     2. Anticoagulation counseling.     3. Benign essential hypertension.     4. Stage III CKD, status post renal transplant in 1997.    ASSESSMENT:  Cande reports increasingly frequent episodes of tachy palpitations, which would correspond to her recent heart monitor done a month ago, which shows paroxysmal atrial fibrillation and intermittent supraventricular tachycardia, with very high ventricular rates, correlating with her symptoms.  I am not sure why this did not get communicated to the patient.  She is responding well to IV diltiazem, and I think we can transition her to oral rate control and anticoagulation.  Her echocardiogram is reassuring, with preserved biventricular systolic function, no significant valve disease.  Cardiac markers are negative for ischemia.    PLAN:     1. Stop IV diltiazem and IV heparin.     2. Stop IV fluids.  She has bibasilar rales.  She  is able to consume oral foods.     3. Start metoprolol XL 25 mg 2 times daily, up titrated as needed for a resting pulse of 70 to 80 BPM.  Very high CHADS-VASc score.  Start systemic anticoagulation.  Given her renal dysfunction and body weight of 121 pounds, age 86 years, low-dose Eliquis 2.5 mg 2 times daily prescribed.  4.  Her blood pressure has been suboptimally controlled over the last few weeks.  She is currently only amlodipine 2.5 mg daily (beta-blocker started on current admission).  Her goal BP is 130/80 mmHg or below, given her renal disease and A. fib.  Currently uptitrate amlodipine as needed, preferably in a twice daily dosing.    If you have any questions, please do not hesitate to page me. Thank you for consulting Cardiology.    Lazaro Reynaga MD        D: 2021   T: 2021   MT: elias    Name:     SHAWN STEVEN  MRN:      [id number]        Account:      [id number]   :      1935           Consult Date: 2021     Document: [id number]

## 2021-09-18 NOTE — CONSULTS
Inpatient Cardiology Consultation   [facility]  Date of Admission: 9/17/2021  Date of Consult:  9/18/2021    Inpatient cardiology consultation note has been dictated. Dictation number [id number]        Lazaro Reynaga MD [facility]  Cardiology              REVIEW OF SYSTEMS:  A comprehensive 10 point review of systems was completed and the pertinent positives are documented in history of present illness.    MEDICATIONS:  Prior to Admission Medications   Prescriptions Last Dose Informant Patient Reported? Taking?   GENGRAF (BRAND) 25 MG CAPSULE 9/17/2021 at x1  Yes Yes   Sig: Take 25 mg by mouth 2 times daily At 1000 and 2200   GLUCOSAMINE SULFATE PO Past Month at Unknown time  Yes Yes   Sig: Take 1,000 mg by mouth 2 times daily Takes 1000mg in the morning and 1000mg in the evening   Vitamin D, Cholecalciferol, 10 MCG (400 UNIT) CHEW Past Week at Unknown time  Yes Yes   Sig: Take 800 mg by mouth daily   amLODIPine (NORVASC) 2.5 MG tablet Not Taking at Unknown time  No No   Sig: Take 1 tablet (2.5 mg) by mouth daily   Patient not taking: Reported on 9/17/2021   aspirin 81 MG tablet 9/16/2021 at hs  Yes Yes   Sig: Take by mouth daily   azaTHIOprine (IMURAN) 50 MG tablet 9/16/2021 at hs  No Yes   Sig: Take 1 tablet (50 mg) by mouth daily   ondansetron (ZOFRAN-ODT) 4 MG ODT tab   No Yes   Sig: Take 1 tablet (4 mg) by mouth every 6 hours as needed for nausea or vomiting      Facility-Administered Medications: None       ALLERGIES:  No Known Allergies    PAST MEDICAL HISTORY:  Past Medical History:   Diagnosis Date     Arthritis      Atrial fibrillation with RVR (H) 09/17/2021     Benign essential hypertension      Glomerulonephritis      Renal transplant recipient 1997     Skin cancer     does not see dermatologist     Stage 3b chronic kidney disease        PAST SURGICAL HISTORY:  Past Surgical History:   Procedure Laterality Date     BIOPSY  6 months    Squeamish cell-removed     COLONOSCOPY   5/15/2013     LAPAROSCOPIC CYSTECTOMY OVARIAN (BENIGN)       TRANSPLANT  October 17, 1987    Kidney       SOCIAL HISTORY:   Cande Baird  reports that she quit smoking about 33 years ago. Her smoking use included cigarettes. She smoked 0.00 packs per day for 0.00 years. She quit smokeless tobacco use about 33 years ago. She reports current alcohol use. She reports that she does not use drugs.    FAMILY HISTORY:  Family History   Problem Relation Age of Onset     Diabetes Mother 60     Peptic Ulcer Disease Father      Diabetes Sister      Rheumatoid Arthritis Sister      Diabetes Sister        PHYSICAL EXAMINATION:  Temp: 98  F (36.7  C) Temp src: Oral BP: (!) 143/81 Pulse: 77   Resp: 18 SpO2: 95 % O2 Device: None (Room air)    09/13 1500 - 09/18 1459  In: 240 [P.O.:240]  Out: 300 [Urine:300]  Net: -60  Vitals:    09/18/21 0132   Weight: 55.1 kg (121 lb 8 oz)           Lazaro Reynaga MD

## 2021-09-18 NOTE — PHARMACY-ADMISSION MEDICATION HISTORY
Admission medication history interview status for this patient is complete. See [facility] admission navigator for allergy information, prior to admission medications and immunization status.     Medication history interview done, indicate source(s): Patient  Medication history resources (including written lists, pill bottles, clinic record):None  Pharmacy: -    Changes made to PTA medication list:  Added: -  Changed: gengraf to 25 mg BID,   Reported as Not Taking: amlodipine  Removed: florastor (taking yoghurt), augmentin    Actions taken by pharmacist (provider contacted, etc):None     Additional medication history information:None    Medication reconciliation/reorder completed by provider prior to medication history?  no   (Y/N)     For patients on insulin therapy:   Do you use sliding scale insulin based on blood sugars?   What is your pre-meal insulin coverage?    Do you typically eat three meals a day?   How many times do you check your blood glucose per day?   How many episodes of hypoglycemia do you typically have per month?   Do you have a Continuous Glucose Monitor (CGM)?      Prior to Admission medications    Medication Sig Last Dose Taking? Auth Provider   aspirin 81 MG tablet Take by mouth daily 9/16/2021 at hs Yes Donna Baird MD   azaTHIOprine (IMURAN) 50 MG tablet Take 1 tablet (50 mg) by mouth daily 9/16/2021 at hs Yes Angel Zamora MD   GENGRAF (BRAND) 25 MG CAPSULE Take 25 mg by mouth 2 times daily At 1000 and 2200 9/17/2021 at x1 Yes Unknown, Entered By History   GLUCOSAMINE SULFATE PO Take 1,000 mg by mouth 2 times daily Takes 1000mg in the morning and 1000mg in the evening Past Month at Unknown time Yes Reported, Patient   ondansetron (ZOFRAN-ODT) 4 MG ODT tab Take 1 tablet (4 mg) by mouth every 6 hours as needed for nausea or vomiting  Yes Masoud Gr MD   Vitamin D, Cholecalciferol, 10 MCG (400 UNIT) CHEW Take 800 mg by mouth daily Past Week at Unknown time Yes Unknown, Entered By  History   amLODIPine (NORVASC) 2.5 MG tablet Take 1 tablet (2.5 mg) by mouth daily  Patient not taking: Reported on 9/17/2021 Not Taking at Unknown time  Masoud Gr MD

## 2021-09-18 NOTE — PROGRESS NOTES
This writer spoke with Antionette from Pharmacy re: unfractionated heparin level of  >1.10. Antionette states that is a false elevated  Level since started elequis.

## 2021-09-18 NOTE — PROGRESS NOTES
[facility]    Hospitalist Progress Note    Date of Service (when I saw the patient): 09/18/2021  Provider:  Flex Rausch MD   Text Page  7am - 6PM       Assessment & Plan   Cande Baird is a 86 year old female admitted on 9/17/2021. 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  She presented to emergency room with palpitations.  Found to be in atrial fibrillation with rapid ventricular response.     1.  Atrial fibrillation w/RVR, new onset. [id number] Score 5. On IV diltiazem started in the emergency room.  Heart rate around 100 at this time.  - IV heparin drip.  -Hold prior to admission amlodipine.  -Continue IV diltiazem.  -Monitor on telemetry.  -Echocardiogram is pending.  -Cardiology consult for new onset A. Fib.  -SARS-CoV-2 PCR results negative.     2.  Renal transplant in 1987.  -Continue PTA Imuran and Gengraf.     3.  Hypertension.  Now on IV diltiazem.  -Hold amlodipine to allow titration of other medications.  -IV diltiazem as noted above.     4.  Recent urinary tract infection.  Had been treated with a course of antibiotics finished more than a week ago.  -Urinalysis negative.     5.  Acute kidney injury.  Does appear that baseline creatinine is around 1.  Worrsening of function since August of this year based on EMR.   Creatinine today is 1.42, slightly better than yesterday.  -  IV fluids.  -Avoid nephrotoxins as able.  -Recheck metabolic panel in the morning.     Diet:  Cardiac diet    Benjamin Catheter: Not present  Central Lines: None  DVT Prophylaxis: Heparin gtt  Code Status: No CPR- Do NOT Intubate    Disposition: Expected discharge in 2 -3  days once rate controlled, renal fx improved    Interval History   She denies chest pain, shortness of breath or palpitation.  Her main concern is to be on top of immunosuppressive medication for her renal transplant.  No other concerns at  the moment.    -Data reviewed today: I reviewed all new labs and imaging results over the last 24 hours. I personally reviewed the EKG tracing showing AF wCVR, HR < 100.    Physical Exam   Temp: 97.7  F (36.5  C) Temp src: Oral BP: (Abnormal) 147/77 Pulse: 76   Resp: 20 SpO2: 96 % O2 Device: None (Room air)    Vitals:    09/18/21 0132   Weight: 55.1 kg (121 lb 8 oz)     Vital Signs with Ranges  Temp:  [97  F (36.1  C)-97.7  F (36.5  C)] 97.7  F (36.5  C)  Pulse:  [] 76  Resp:  [18-20] 20  BP: (123-178)/() 147/77  SpO2:  [90 %-97 %] 96 %  I/O last 3 completed shifts:  In: -   Out: 300 [Urine:300]    GEN:  Alert, oriented x 3, appears comfortable, NAD.  HEENT:  Normocephalic/atraumatic, no scleral icterus, no nasal discharge, mouth moist.  CV:  Irregular rate, CHELSEY I/VI in mid LSB, no JVD.   LUNGS:  Clear to auscultation bilaterally without rales/rhonchi/wheezing/retractions.  Symmetric chest rise on inhalation noted.  ABD:  Active bowel sounds, soft, non-tender/non-distended.  No rebound/guarding/rigidity.  EXT:  No edema or cyanosis.  No joint synovitis noted.  SKIN:  Dry to touch, no exanthems noted in the visualized areas.       Medications     dilTIAZem HCl-Sodium Chloride 5 mg/hr (09/18/21 0736)     heparin 650 Units/hr (09/18/21 0736)     sodium chloride 100 mL/hr at 09/18/21 0736       aspirin  81 mg Oral Daily     azaTHIOprine  50 mg Oral Daily     cycloSPORINE modified  25 mg Oral BID     sodium chloride (PF)  3 mL Intracatheter Q8H     sodium chloride (PF)  3 mL Intracatheter Q8H       Data   Recent Labs   Lab 09/18/21  0734 09/18/21  0150 09/17/21  2124   WBC  --  8.1 8.3   HGB  --  12.3 12.6   MCV  --  97 94   PLT  --  239 245   INR  --   --  0.99     --  135   POTASSIUM 3.5  --  3.7   CHLORIDE 104  --  100   CO2 26  --  28   BUN 18  --  20   CR 1.42*  --  1.52*   ANIONGAP 7  --  7   LETI 8.7  --  9.1   GLC 93  --  109*   ALBUMIN  --   --  3.1*   PROTTOTAL  --   --  6.9   BILITOTAL  --    --  0.5   ALKPHOS  --   --  116   ALT  --   --  33   AST  --   --  39   TROPONIN  --   --  <0.015       Recent Results (from the past 24 hour(s))   XR Chest 2 Views    Narrative    EXAM: XR CHEST 2 VW  LOCATION: [facility]  DATE/TIME: 9/17/2021 10:01 PM    INDICATION: Chest pain.  COMPARISON: Chest x-ray on 08/09/2021.      Impression    IMPRESSION: PA and lateral views of the chest were obtained. Stable cardiomediastinal silhouette. Atherosclerotic vascular calcification of the aortic knob. No suspicious focal pulmonary opacities. No significant pleural effusion or pneumothorax.         Disclaimer: This note consists of symbols derived from keyboarding, dictation and/or voice recognition software. As a result, there may be errors in the script that have gone undetected. Please consider this when interpreting information found in this chart.  .

## 2021-09-18 NOTE — PLAN OF CARE
"PRIMARY DIAGNOSIS: \"GENERIC\" NURSING  OUTPATIENT/OBSERVATION GOALS TO BE MET BEFORE DISCHARGE:  1. ADLs back to baseline: no    2. Activity and level of assistance: Up with standby assistance.    3. Pain status: Pain free.    4. Return to near baseline physical activity: No     Discharge Planner Nurse   Safe discharge environment identified: No  Barriers to discharge: Yes       Entered by: Elias Vela 09/18/2021 3:11 PM     Please review provider order for any additional goals.   Nurse to notify provider when observation goals have been met and patient is ready for discharge.Vss A&Ox4 up with A1. Denies pain. Tele afib cvr. Dilt, heparin, and ivf d/c'd. Metoprolol and eliquis started. Edu given to pt on new medications. Pt has been gagging with oral med intake. States it is not new and she has tried zofran in the past for it. Takes meds with juice pudding or apple sauce. Declined zofran as it has not helped in the past. Alarms on. Calls aprop.   "

## 2021-09-18 NOTE — PLAN OF CARE
[facility]      OUTPATIENT PHYSICAL THERAPY EVALUATION  PLAN OF TREATMENT FOR OUTPATIENT REHABILITATION  (COMPLETE FOR INITIAL CLAIMS ONLY)  Patient's Last Name, First Name, M.I.  YOB: 1935  Cande Baird                        Provider's Name  [facility] Medical Record No.  [id number]                               Onset Date:  09/17/21   Start of Care Date:         Type:     _X_PT   ___OT   ___SLP Medical Diagnosis:                           PT Diagnosis:  Impaired fn mobility    Visits from SOC:  1   _________________________________________________________________________________  Plan of Treatment/Functional Goals    Planned Interventions: balance training, bed mobility training, gait training, postural re-education, strengthening, home exercise program     Goals: See Physical Therapy Goals on Care Plan in [facility] electronic health record.    Therapy Frequency: Daily  Predicted Duration of Therapy Intervention: 3 days  _________________________________________________________________________________    I CERTIFY THE NEED FOR THESE SERVICES FURNISHED UNDER        THIS PLAN OF TREATMENT AND WHILE UNDER MY CARE     (Physician co-signature of this document indicates review and certification of the therapy plan).               ,      Referring Physician: Flex Rausch MD            Initial Assessment        See Physical Therapy evaluation dated   in Epic electronic health record.

## 2021-09-18 NOTE — ED TRIAGE NOTES
"Pt presents via EMS for evaluation of generalized weakness, that became worse tonight. Pt got up to take a pill, then felt nauseous. Felt heart \"fluttering/thumping\". Hx of kidney transplant.   "

## 2021-09-19 NOTE — CONSULTS
Care Management Initial Consult    General Information  Assessment completed with: Patient, Children,    Type of CM/SW Visit: Initial Assessment    Primary Care Provider verified and updated as needed: Yes   Readmission within the last 30 days: no previous admission in last 30 days      Reason for Consult: discharge planning, care coordination/care conference  Advance Care Planning:            Communication Assessment  Patient's communication style: spoken language (English or Bilingual)    Hearing Difficulty or Deaf: no        Cognitive  Cognitive/Neuro/Behavioral: WDL                      Living Environment:   People in home: facility resident     Current living Arrangements: assisted living  Name of Facility: [facility]   Able to return to prior arrangements: yes       Family/Social Support:  Care provided by: self  Provides care for: no one  Marital Status:   , Children          Description of Support System: Supportive, Involved    Support Assessment: Adequate family and caregiver support    Current Resources:   Patient receiving home care services:  none     Community Resources:  none  Equipment currently used at home:  walker          Lifestyle & Psychosocial Needs:  Social Determinants of Health     Tobacco Use: Medium Risk     Smoking Tobacco Use: Former Smoker     Smokeless Tobacco Use: Former User   Alcohol Use:      Frequency of Alcohol Consumption:      Average Number of Drinks:      Frequency of Binge Drinking:    Financial Resource Strain:      Difficulty of Paying Living Expenses:    Food Insecurity:      Worried About Running Out of Food in the Last Year:      Ran Out of Food in the Last Year:    Transportation Needs:      Lack of Transportation (Medical):      Lack of Transportation (Non-Medical):    Physical Activity:      Days of Exercise per Week:      Minutes of Exercise per Session:    Stress:      Feeling of Stress :    Social Connections:      Frequency of Communication  with Friends and Family:      Frequency of Social Gatherings with Friends and Family:      Attends [employer] Services:      Active Member of Clubs or Organizations:      Attends Club or Organization Meetings:      Marital Status:    Intimate Partner Violence:      Fear of Current or Ex-Partner:      Emotionally Abused:      Physically Abused:      Sexually Abused:    Depression: Not at risk     PHQ-2 Score: 0   Housing Stability:      Unable to Pay for Housing in the Last Year:      Number of Places Lived in the Last Year:      Unstable Housing in the Last Year:        Additional Information:  CM consulted for discharge planning. Pt was admitted under observation for afib. She was followed by PT and recommended for home PT. Met with pt at length at bedside to discuss plan of care. She states she lives with her  at [facility]. She gets assistance with bathing only. She feels she needs increased assistance at her [facility]. She would like assistance with housekeeping and meals although states she gets meals at her [facility]. Call placed to [facility] and spoke to Jose [phone] regarding pt services and possibility of increasing services. Jose verifies that pt currently only gets bathing assistance. He agrees that pt could use increased services as she has become weaker. An increase in services will require increased payment by pt and spouse and will need to be discussed when pt returns to facility. Pt is agreeable to this. Phone call made with Jose, RNCC and pt together to discuss increasing services upon return to facility. Everyone is agreeable. Jose will reach out to their Director to discuss options with pt, spouse and pt's daughter tomorrow. Call placed to pt's daughter Juanita per pt request to discuss plan of care. Juanita agrees that her parents are in need of more assistance and will be available to discuss this with [facility] staff tomorrow.     Recommendations for HC PT/OT was also  discussed with pt and daughter Juanita. They are agreeable to HC. Pt/family was provided with the Medicare Compare list for Home Care.  Discussed associated Medicare star ratings to assist with choice for referrals/discharge planning Yes. Education was given to pt/family that star ratings are updated/maintained by Medicare and can be reviewed by visiting www.medicare.gov Yes. Pt follows with Dr Baird as her PCP at [facility]. She would like to use [facility] for HC services. Referral sent.    [facility] [employer] brochure discussed with daughter Juanita for additional assistance for her parents if needed. Brochure given to pt at bedside.     No further needs at this time. Will cont to follow for discharge.            Antionette Cruz RN BSN CM  Inpatient Care Coordination  [facility]  [phone]

## 2021-09-19 NOTE — PLAN OF CARE
VSS, afeb., LS are dim, 95% on RA. Appetite improved, ate 75% for dinner. Pt ambulated in halls w PT, kim well. Pt also converted to SR this shift,  BP elevted, Norvasc added.Creat elevated, will recheck in AM. Discharge to Assisted Living in 1-2 days, SW to help w/ discharge. Tele is SR.

## 2021-09-19 NOTE — PLAN OF CARE
Pt daughter Juanita, will be here Monday, Sept 20th at 10:00am to pick-up pt.    Pt A/O x 4, VSS (ex /76 - gave scheduled meds); Pt denies pain, headache, dizziness, N/V & SOB. Lung sounds clear, RA. Tele: SR. Pt up Asst:1 w/gait belt & walker. PT/OT, Social Work following. Will continue with plan of care.    PRIMARY DIAGNOSIS: CHEST PAIN  OUTPATIENT/OBSERVATION GOALS TO BE MET BEFORE DISCHARGE:  1. Ruled out acute coronary syndrome (negative or stable Troponin):  Yes  2. Pain Status: Pain free.  3. Appropriate provocative testing performed: Yes  - Stress Test Procedure: ECHO  - Interpretation of cardiac rhythm per telemetry tech: SR    4. Cleared by Consultants (if applicable):Yes  5. Return to near baseline physical activity: Yes  Discharge Planner Nurse   Safe discharge environment identified: Yes  Barriers to discharge: No       Entered by: Jaclyn Reyes 09/19/2021 3:49 PM     Please review provider order for any additional goals.   Nurse to notify provider when observation goals have been met and patient is ready for discharge.

## 2021-09-19 NOTE — PLAN OF CARE
AOx4. SBA. Denies pain. Elevated BPs. Anxious about going home, worried about her ability to care for herself. Tele SR. SL.    Plan: PT/OT, SW    BP (!) 170/82 (BP Location: Right arm)   Pulse 76   Temp 97.8  F (36.6  C) (Oral)   Resp 18   Ht 1.524 m (5')   Wt 55.1 kg (121 lb 8 oz)   SpO2 92%   Breastfeeding No   BMI 23.73 kg/m

## 2021-09-19 NOTE — PLAN OF CARE
OT: Orders received. Chart reviewed and discussed with PT. IP OT not indicated due to patient is currently SBA/CGA for functional mobility and has good support at home for ADLs and uses services for some IADL. Patient to continue to work with PT for advancing activity tolerance and strength. Patient would benefit from skilled home OT/PT services at discharge to advance activity tolerance, IADL management, and safe functional mobility.  Defer discharge recommendations to PT and care team.  Will complete orders.

## 2021-09-19 NOTE — PROGRESS NOTES
[facility]    Hospitalist Progress Note    Date of Service (when I saw the patient): 09/19/2021  Provider:  Flex Rausch MD   Text Page  7am - 6PM       Assessment & Plan   Cande Baird is a 86 year old female admitted on 9/17/2021. 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  She presented to emergency room with palpitations.  Found to be in atrial fibrillation with rapid ventricular response.     1.  Atrial fibrillation w/RVR, new onset. [id number] Score 5.   Converted to sinus rhythm. Discontinue diltiazem, seen by cardiology, started on apixaban, metoprolol.    - IV heparin drip discontinue, now on apixaban. .  -Resume amlodipine.  -Monitor on telemetry.  -Echocardiogram completed ending.  -Cardiology consulted for new onset A. Fib.  -SARS-CoV-2 PCR results negative.     2.  Renal transplant in 1987.  -Continue PTA Imuran and Gengraf.     3.  Hypertension.    -Resumed amlodipine. metoprolol added       4.  Recent urinary tract infection.  Had been treated with a course of antibiotics finished more than a week ago.  -Urinalysis negative.     5.  Acute kidney injury.  Does appear that baseline creatinine is around 1.4 - 1.6.  Worrsening of function since August of this year based on EMR.   Creatinine today is 1.52.  -Avoid nephrotoxins as able.  -Recheck metabolic panel in the morning.     Diet:  Cardiac diet    Benjamin Catheter: Not present  Central Lines: None  DVT Prophylaxis: Heparin gtt  Code Status: No CPR- Do NOT Intubate    Disposition: Expected discharge in 24 hours to home with HHA, physical therapy and Occupational Therapy.     Interval History   No chest pain, shortness of breath or palpitation.  Working with physical therapy.  No other concerns at the moment.    -Data reviewed today: I reviewed all new labs and imaging results over the last 24 hours. I personally reviewed the EKG  tracing showing Normal sinus rhythm in the monitor.    Physical Exam   Temp: 98  F (36.7  C) Temp src: Oral BP: (Abnormal) 157/78 Pulse: 67   Resp: 16 SpO2: 94 % O2 Device: None (Room air)    Vitals:    09/18/21 0132   Weight: 55.1 kg (121 lb 8 oz)     Vital Signs with Ranges  Temp:  [97.7  F (36.5  C)-98  F (36.7  C)] 98  F (36.7  C)  Pulse:  [67-95] 67  Resp:  [16-20] 16  BP: (143-170)/(74-82) 157/78  SpO2:  [92 %-95 %] 94 %  I/O last 3 completed shifts:  In: 480 [P.O.:480]  Out: -     GEN:  Alert, oriented x 3, appears comfortable, NAD.  HEENT:  Normocephalic/atraumatic, no scleral icterus, no nasal discharge, mouth moist.  CV:  Regular rate, CHELSEY I/VI in mid LSB, no JVD. S1, S2 present  LUNGS:  Clear to auscultation bilaterally without rales/rhonchi/wheezing/retractions.  Symmetric chest rise on inhalation noted.  ABD:  Active bowel sounds, soft, non-tender/non-distended.  No rebound/guarding/rigidity.  EXT:  No edema or cyanosis.  No joint synovitis noted.  SKIN:  Dry to touch, no exanthems noted in the visualized areas.       Medications       amLODIPine  5 mg Oral BID     apixaban ANTICOAGULANT  2.5 mg Oral BID     azaTHIOprine  50 mg Oral Daily     cycloSPORINE modified  25 mg Oral BID     metoprolol succinate ER  25 mg Oral BID     sodium chloride (PF)  3 mL Intracatheter Q8H     sodium chloride (PF)  3 mL Intracatheter Q8H       Data   Recent Labs   Lab 09/19/21  0656 09/18/21  0734 09/18/21  0150 09/17/21  2124   WBC 6.5  --  8.1 8.3   HGB 11.9  --  12.3 12.6   MCV 94  --  97 94     --  239 245   INR  --   --   --  0.99    137  --  135   POTASSIUM 3.7 3.5  --  3.7   CHLORIDE 104 104  --  100   CO2 25 26  --  28   BUN 18 18  --  20   CR 1.51* 1.42*  --  1.52*   ANIONGAP 7 7  --  7   LETI 9.0 8.7  --  9.1   GLC 91 93  --  109*   ALBUMIN  --   --   --  3.1*   PROTTOTAL  --   --   --  6.9   BILITOTAL  --   --   --  0.5   ALKPHOS  --   --   --  116   ALT  --   --   --  33   AST  --   --   --  39    TROPONIN  --   --   --  <0.015       No results found for this or any previous visit (from the past 24 hour(s)).      Disclaimer: This note consists of symbols derived from keyboarding, dictation and/or voice recognition software. As a result, there may be errors in the script that have gone undetected. Please consider this when interpreting information found in this chart.  .

## 2021-09-19 NOTE — PROGRESS NOTES
[facility].  Inpatient Cardiology Progress Note.  Date of Service:  9/19/2021       INTERVAL HISTORY:  Patient converted to sinus rhythm yesterday.  Tolerating metoprolol XL 25 mg daily.  Issues with hypertension with blood pressure as high as 170 mmHg systolic on the 2.5 mg of amlodipine and metoprolol combination.  Successfully switched to oral anticoagulation with Eliquis yesterday.      DIAGNOSES:     1. New diagnosis of atrial fibrillation with rapid ventricular rate.     2. Benign essential hypertension.     3. Stage III CKD, status post renal transplant in 1997.    ASSESSMENT/PLAN:  1.  Continue metoprolol XL 25 mg 2 times daily.  2.  Continue low-dose Eliquis 2.5 mg 2 times daily (renal dysfunction, age 86 years, body weight 121 pounds).  3.  Amlodipine increased to 5 mg 2 times daily. Her goal BP is 130/80 mmHg or below, given her renal disease and A. fib.  4.  I have ordered outpatient cardiology TWAN follow-up and heart rhythm monitor to be done in 4 weeks.  5.  Cardiology will sign off.  Thank you for consulting us.    Lazaro Reynaga MD [facility]  Cardiology.        VITAL SIGNS:  Temp: 97.7  F (36.5  C) Temp src: Oral BP: (!) 161/76 Pulse: 86   Resp: 16 SpO2: 94 % O2 Device: None (Room air)    09/14 0700 - 09/19 0659  In: 480 [P.O.:480]  Out: 300 [Urine:300]  Net: 180  Vitals:    09/18/21 0132   Weight: 55.1 kg (121 lb 8 oz)

## 2021-09-19 NOTE — PLAN OF CARE
PRIMARY DIAGNOSIS: CHEST PAIN  OUTPATIENT/OBSERVATION GOALS TO BE MET BEFORE DISCHARGE:  1. Ruled out acute coronary syndrome (negative or stable Troponin):  Yes  2. Pain Status: Pain free.  3. Appropriate provocative testing performed: Yes  - Stress Test Procedure: Echo  - Interpretation of cardiac rhythm per telemetry tech: SR    4. Cleared by Consultants (if applicable):Yes  5. Return to near baseline physical activity: Yes  Discharge Planner Nurse   Safe discharge environment identified: No  Barriers to discharge: No       Entered by: Jaclyn Reyes 09/19/2021 10:45 AM     Please review provider order for any additional goals.   Nurse to notify provider when observation goals have been met and patient is ready for discharge.    Pt A/O x 4, VSS (ex /76 - gave scheduled meds); Pt denies pain, headache, dizziness, N/V & SOB. Lung sounds clear, RA. Tele: SR. Pt up Asst:1 w/gait belt & walker. PT/OT, Social Work following. Will continue with plan of care.

## 2021-09-20 NOTE — PLAN OF CARE
PRIMARY DIAGNOSIS: GENERALIZED WEAKNESS/ AFIB RVR - RESOLVED    OUTPATIENT/OBSERVATION GOALS TO BE MET BEFORE DISCHARGE  1. Orthostatic performed: N/A    2. Tolerating PO medications: Yes    3. Return to near baseline physical activity: Yes    4. Cleared for discharge by consultants (if involved): Yes    Discharge Planner Nurse   Safe discharge environment identified: Yes  Barriers to discharge: No       Entered by: Marisela Jung 09/20/2021      BP (!) 144/69 (BP Location: Right arm)   Pulse 64   Temp 98.1  F (36.7  C) (Oral)   Resp 16   Ht 1.524 m (5')   Wt 55.1 kg (121 lb 8 oz)   SpO2 95%   Breastfeeding No   BMI 23.73 kg/m       AOx4.  VSS.  Denies pain.  Up w/ sba. SL.  Pt tx from 3rd floor.  Pt to discharge w/ daughter at 1000 today.  Will cont to monitor & provide cares.        Please review provider order for any additional goals.   Nurse to notify provider when observation goals have been met and patient is ready for discharge.

## 2021-09-20 NOTE — DISCHARGE INSTRUCTIONS
[facility] was not able to accept you home care referral due to being at capacity.   Your home care referral was instead sent to [facility]  If you haven't heard from them within the next 24-48 hours,  Please call them at [phone]

## 2021-09-20 NOTE — PROGRESS NOTES
Care Management Follow Up    Length of Stay (days): 0    Expected Discharge Date: 09/20/2021     Concerns to be Addressed: care coordination/care conferences, discharge planning     Patient plan of care discussed at interdisciplinary rounds: Yes    Anticipated Discharge Disposition: Home, Home Care     Anticipated Discharge Services: None  Anticipated Discharge DME: None    Patient/family educated on Medicare website which has current facility and service quality ratings: yes  Education Provided on the Discharge Plan:    Patient/Family in Agreement with the Plan: yes    Referrals Placed by CM/SW: Homecare    Additional Information: Received notice that [facility] home care will be unable to take due to being at capacity. Pt resides at [facility] and they commonly use [facility]. Called [facility] [phone] to see if they would be able to accept pt for admission for home PT/OT. They are able to accept, referral sent to [phone] will send orders once complete. AVS updated.     Planning for 10am discharge as previously planned with pt/family, provider and CM on 9/19.     Rachel Guaman RN BSN   Inpatient Care Coordination  [facility]   Phone [phone]

## 2021-09-20 NOTE — PLAN OF CARE
"A&Ox4. VSS ex htn, scheduled BP meds. 2 PIVs patent and saline locked. Ambulating SBA. Tele SR. PT/OT/SW following. Discharge tomorrow at 10am.     PRIMARY DIAGNOSIS: \"GENERIC\" NURSING  OUTPATIENT/OBSERVATION GOALS TO BE MET BEFORE DISCHARGE:  1. ADLs back to baseline: Yes    2. Activity and level of assistance: Up with standby assistance.    3. Pain status: Pain free.    4. Return to near baseline physical activity: Yes     Discharge Planner Nurse   Safe discharge environment identified: Yes  Barriers to discharge: No       Entered by: Dwaine Conde 09/19/2021 8:45 PM     Please review provider order for any additional goals.   Nurse to notify provider when observation goals have been met and patient is ready for discharge.    Dwaine Conde RN on 9/19/2021 at 10:56 PM    "

## 2021-09-20 NOTE — PLAN OF CARE
PRIMARY DIAGNOSIS: GENERALIZED WEAKNESS/A fib RVR-resolved    OUTPATIENT/OBSERVATION GOALS TO BE MET BEFORE DISCHARGE  1. Orthostatic performed: N/A    2. Tolerating PO medications: Yes    3. Return to near baseline physical activity: Yes    4. Cleared for discharge by consultants (if involved): Yes    Discharge Planner Nurse   Safe discharge environment identified: Yes  Barriers to discharge: Yes       Entered by: Delma Mosqueda 09/20/2021 2:16 AM     Patient alert and oriented, up with SBA and walker to the bathroom. Patient reports feeling down about her situation, states she feels hopeless about her health and being in the hospital. Pt and daughter met with social work yesterday about receiving some more help with adls at home. Plan is to discharge to home today, daughter to transport.    Please review provider order for any additional goals.   Nurse to notify provider when observation goals have been met and patient is ready for discharge.

## 2021-09-20 NOTE — PLAN OF CARE
Patient's After Visit Summary was reviewed with patient.   Patient verbalized understanding of After Visit Summary, recommended follow up and was given an opportunity to ask questions.   Discharge medications sent home with patient/family: No.  Discharged with daughter.    OBSERVATION patient END time: 1000

## 2021-09-20 NOTE — PROGRESS NOTES
"PRIMARY DIAGNOSIS: \"GENERIC\" NURSING  OUTPATIENT/OBSERVATION GOALS TO BE MET BEFORE DISCHARGE:  1. ADLs back to baseline: Yes    2. Activity and level of assistance: Up with standby assistance.    3. Pain status: Pain free.    4. Return to near baseline physical activity: Yes     Discharge Planner Nurse   Safe discharge environment identified: Yes  Barriers to discharge: No       Entered by: Dwaine Conde 09/19/2021 8:12 PM     Please review provider order for any additional goals.   Nurse to notify provider when observation goals have been met and patient is ready for discharge.    Dwaine Conde RN on 9/19/2021 at 8:12 PM    "

## 2021-09-20 NOTE — PLAN OF CARE
PRIMARY DIAGNOSIS: GENERALIZED WEAKNESS/A fib RVR-resolved     OUTPATIENT/OBSERVATION GOALS TO BE MET BEFORE DISCHARGE  1. Orthostatic performed: N/A     2. Tolerating PO medications: Yes     3. Return to near baseline physical activity: Yes     4. Cleared for discharge by consultants (if involved): Yes     Discharge Planner Nurse   Safe discharge environment identified: Yes  Barriers to discharge: Yes       Entered by: Delma Mosqueda RN on 9/20/2021 at 5:25 AM    Unchanged from prior assessment. Tele still SR. VSS, up with assist of 1 and walker to the bathroom. Plan to discharge to home today.      Please review provider order for any additional goals.   Nurse to notify provider when observation goals have been met and patient is ready for discharge.

## 2021-09-20 NOTE — PLAN OF CARE
Physical Therapy Discharge Summary     Reason for therapy discharge:    Discharged to home PT/OT     Progress towards therapy goal(s). See goals on Care Plan in [facility] electronic health record for goal details.  Goals met     Therapy recommendation(s):    Continued therapy is recommended.  Rationale/Recommendations:  Pt is below baseline with functional mobility and strength and would benefit from continued PT to progress skills.

## 2021-09-21 NOTE — TELEPHONE ENCOUNTER
Home care ordered at hospital discharge, provider approval needed.    Merari Reynoso RN  [facility]

## 2021-09-21 NOTE — TELEPHONE ENCOUNTER
Elsa at [facility] ([phone]) Pt for 2xwk 4wks and 1xwk for wk. OK to leave a detailed message.

## 2021-09-21 NOTE — PROGRESS NOTES
Clinic Care Coordination Contact    Background: Care Coordination referral placed from \[facility]\"" discharge report for reason of patient meeting criteria for a TCM outreach call by [facility] [place] team.    Assessment: Upon chart review, [facility] Team member will cancel/close the referral for TCM outreach due to reason below:     Cande stated she wasn't doing much better but there was a  coming to her house to chat in a few minutes and she wasn't in the mood so I didn't continue questions.    Plan: Care Coordination referral for TCM outreach canceled.    Alicia Glasgow  Community Health Worker  [facility]  [phone]

## 2021-09-22 NOTE — TELEPHONE ENCOUNTER
Left voice message for Elsa with authorization from Dr. Baird for home care orders.    Merari Reynoso RN  [facility]

## 2021-09-27 NOTE — PROGRESS NOTES
Assessment & Plan     Paroxysmal atrial fibrillation (H)  Still having palpitations, with lightheadedness and dyspnea on exertion will refer to Cardiology   - Adult Cardiology Eval Referral; Future    BUNN (dyspnea on exertion)  as above.   - Adult Cardiology Eval Referral; Future    Lightheadedness  as above.   - Adult Cardiology Eval Referral; Future           No follow-ups on file.    Donna Baird MD  [facility] ISHMAEL Castellon is a 86 year old who presents for the following health issues     HPI     Daughter in room.       Hospital Follow-up Visit:    Hospital/Nursing Home/ Rehab Facility: [facility]  Date of Admission: 9-  Date of Discharge: 9-  Reason(s) for Admission: A. fib      Was your hospitalization related to COVID-19? No   Problems taking medications regularly:  None  Medication changes since discharge: None  Problems adhering to non-medication therapy:  None    Summary of hospitalization:  [facility] discharge summary reviewed  Diagnostic Tests/Treatments reviewed.  Follow up needed: none  Other Healthcare Providers Involved in Patient s Care:         None  Update since discharge: she says she feels no better. She is afraid to get out of the chair because her heart will or flutter she will feel short of breath and lightheaded. Her nausea has resolved. She has no back pain. Her kidney function since the Pyelo has not returned to normal GFR is down to about 30.        Post Discharge Medication Reconciliation: discharge medications reconciled, continue medications without change.  Plan of care communicated with patient and daughter                Review of Systems   Constitutional, HEENT, cardiovascular, pulmonary, GI, , musculoskeletal, neuro, skin, endocrine and psych systems are negative, except as otherwise noted.      Objective    Wt 55.8 kg (123 lb)   BMI 24.02 kg/m    Body mass index is 24.02  kg/m .  Physical Exam   GENERAL: healthy, alert and no distress  NECK: no adenopathy, no asymmetry, masses, or scars and thyroid normal to palpation  RESP: lungs clear to auscultation - no rales, rhonchi or wheezes  CV: regular rate and rhythm, normal S1 S2, no S3 or S4, no murmur, click or rub, no peripheral edema and peripheral pulses strong  ABDOMEN: soft, nontender, no hepatosplenomegaly, no masses and bowel sounds normal  MS: no gross musculoskeletal defects noted, no edema

## 2021-09-30 NOTE — PATIENT INSTRUCTIONS
Thank you for your [facility] visit today. Your provider has recommended the following:  Medication Changes:  DECREASE the amlodipine to 5mg once a day in the AM  Recommendations:  INCREASE your fluid intake-water, juice, coffee, tea or electrolyte drinks are all ok  Try to do a little slow walking each day and take your time when changing positions  Nuc Gisel stress test  Follow-up:  See Elisabeth KELLY for cardiology follow up in 2-3 weeks.    Reminder:  Please bring in your current medication list or your medication, over the counter supplements and vitamin bottles as we will review these at each office visit.

## 2021-09-30 NOTE — PROGRESS NOTES
"SERVICE DATE: 9/30/2021     Primary Cardiologist: Dr Reynaga    REASON FOR VISIT:  Cande Baird presents for post hospital follow up. I am meeting her for the first time.    HISTORY OF PRESENT ILLNESS/ASSESSMENT AND PLAN:  Cardiovascular and pertinent history includes HTN, CKD with hx of kidney transplant in 1997 (baseline creatinine 1.4 to 1.5) and recently dx PAF.     She was recently hospitalized at the [facility] from 8/24 to 8/26 with pyuria secondary to pyelonephritis, nonoliguric acute kidney injury, and hypertensive urgency.  She was started on amlodipine 2.5 mg daily for her hypertension, and upon discharge, a 5d Zio monitor was placed. This showed paroxysmal atrial fibrillation and PSVT with periods of rapid ventricular rates, with a burden of approximately 22% and longest period being 20 hours.  Intermittently, heart rate went up to the 180s, and her symptoms correlated with both the atrial fibrillation and SVT. Unfortunately no follow up occurred. She then presented to [facility] 9/17 with generalized weakness and palpitations, she was found to be in afib with RVR. Cardiology was consulted and she was seen by Dr Renyaga. TTE showed preserved biventricular systolic function, no significant valve disease, asc aorta was moderately dilated. She was started on metoprolol XL 25mg BID and Eliquis 2.5mg BID (lower dose due to age, body weight and renal disease). Amlodipine increased due to elevated BPs.    Cande presents today accompanied by her daughter for ongoing chest symptoms. Cande states for at least several months she has noted her heart \"beating fast\" with even minimal exertion consistently. Doesn't really feel any pain or pressure in the chest. When she is at rest most of the time she feels pretty good although occ she will notice palpitations. Also with exertion she feels rather lightheaded \"like I just need to sit down\", this is worse after her most recent hospital stay. No SOB, mild stable LE edema. " "    EKG today per my review shows SR with incomplete RBBB. She feels fine at rest but did feel symptoms coming in today.      1. Exertional symptoms of \"heart beating fast\" and lightheadedness.   -She does have PAF with RVR, but it seems like she is having the symptoms even when she is not in afib (like today). Her prior Zio did show her ave HR even in SR is in the 90s and that the max sinus rate was in the 150s.   -Symptoms seem worse after recent hospital stay and I question if she is somewhat orthostatic when upright and gets reflex tachy.   -Will decreased amlodipine back to 5mg in the AM only. May consider increasing the metoprolol in the future depending on her symptoms.  -Will also get a repeat Gisel nuc to look for any significant ischemia since symptoms have been ongoing and worsening for months.  -Discussed repeat rhythm monitor, but they would prefer to defer for now    2. PAF, likely symptomatic but I don't think it explains all of her symptoms.  -Continue metoprolol and Eliquis as is for now  -Considered an antiarrhythmic but will hold off for now since most of her symptoms are probably not from the afib.    3. HTN. BP looks great in the office today, but might be a bit low for her.    Follow up: Post Gisel stress test.  Of course, the patient was encouraged to contact us sooner with questions or concerns.    43 minutes spent on the date of the encounter doing chart review, review of test results, interpretation of tests, patient visit and documentation     Elisabeth Scanlon PA-C      CURRENT MEDICATIONS:   Current Outpatient Medications   Medication Sig Dispense Refill     amLODIPine (NORVASC) 5 MG tablet Take 1 tablet (5 mg) by mouth 2 times daily 60 tablet 0     apixaban ANTICOAGULANT (ELIQUIS) 2.5 MG tablet Take 1 tablet (2.5 mg) by mouth 2 times daily 60 tablet 0     azaTHIOprine (IMURAN) 50 MG tablet Take 1 tablet (50 mg) by mouth daily 90 tablet 0     GENGRAF (BRAND) 25 MG CAPSULE Take 25 mg " "by mouth 2 times daily At 1000 and 2200       metoprolol succinate ER (TOPROL-XL) 25 MG 24 hr tablet Take 1 tablet (25 mg) by mouth 2 times daily 60 tablet 0     aspirin 81 MG tablet Take by mouth daily 30 tablet      GLUCOSAMINE SULFATE PO Take 1,000 mg by mouth 2 times daily Takes 1000mg in the morning and 1000mg in the evening (Patient not taking: Reported on 9/30/2021)       Vitamin D, Cholecalciferol, 10 MCG (400 UNIT) CHEW Take 800 mg by mouth daily (Patient not taking: Reported on 9/30/2021)         ALLERGIES:  No Known Allergies    ROS:  Skin:  Positive for bruising;scaling   Eyes:  Negative    ENT:  Negative    Respiratory:    shortness of breath;dyspnea on exertion  Cardiovascular:    palpitations;chest pain;Positive for;lightheadedness;dizziness;edema;fatigue  Gastroenterology: Negative    Genitourinary:       Musculoskeletal:  Negative    Neurologic:  Negative    Psychiatric:  Negative    Heme/Lymph/Imm:  Positive for easy bruising  Endocrine:  Negative      PHYSICAL EXAMINATION:    GENERAL:  The patient is a pleasant 86 year old who appears their stated age.  In no apparent distress.  VITAL SIGNS:  /64 (BP Location: Right arm, Patient Position: Sitting, Cuff Size: Adult Regular)   Pulse 82   Ht 1.6 m (5' 3\")   Wt 55.1 kg (121 lb 8 oz)   SpO2 94%   BMI 21.52 kg/m      RESPIRATORY:  Breathing is nonlabored.  Lungs are clear to auscultation bilaterally.   CARDIAC:  RRR, no murmur  EXTREMITIES:  Trace BLE edema  NEUROLOGIC:  Alert and oriented.    DATA REVIEWED:    Prior hospital records and testing and prior Zio Patch monitor. EKG today as above.   "

## 2021-09-30 NOTE — LETTER
"9/30/2021    [facility]  [address]  [facility] [address]    RE: Cande Baird       Dear Colleague,    I had the pleasure of seeing Cande Baird in the [facility].    SERVICE DATE: 9/30/2021     Primary Cardiologist: Dr Reynaga    REASON FOR VISIT:  Cande Baird presents for post hospital follow up. I am meeting her for the first time.    HISTORY OF PRESENT ILLNESS/ASSESSMENT AND PLAN:  Cardiovascular and pertinent history includes HTN, CKD with hx of kidney transplant in 1997 (baseline creatinine 1.4 to 1.5) and recently dx PAF.     She was recently hospitalized at the [facility] from 8/24 to 8/26 with pyuria secondary to pyelonephritis, nonoliguric acute kidney injury, and hypertensive urgency.  She was started on amlodipine 2.5 mg daily for her hypertension, and upon discharge, a 5d Zio monitor was placed. This showed paroxysmal atrial fibrillation and PSVT with periods of rapid ventricular rates, with a burden of approximately 22% and longest period being 20 hours.  Intermittently, heart rate went up to the 180s, and her symptoms correlated with both the atrial fibrillation and SVT. Unfortunately no follow up occurred. She then presented to [facility] 9/17 with generalized weakness and palpitations, she was found to be in afib with RVR. Cardiology was consulted and she was seen by Dr Reynaga. TTE showed preserved biventricular systolic function, no significant valve disease, asc aorta was moderately dilated. She was started on metoprolol XL 25mg BID and Eliquis 2.5mg BID (lower dose due to age, body weight and renal disease). Amlodipine increased due to elevated BPs.    Cande presents today accompanied by her daughter for ongoing chest symptoms. Cande states for at least several months she has noted her heart \"beating fast\" with even minimal exertion consistently. Doesn't really feel any pain or pressure in the chest. When she is at " "rest most of the time she feels pretty good although occ she will notice palpitations. Also with exertion she feels rather lightheaded \"like I just need to sit down\", this is worse after her most recent hospital stay. No SOB, mild stable LE edema.     EKG today per my review shows SR with incomplete RBBB. She feels fine at rest but did feel symptoms coming in today.      1. Exertional symptoms of \"heart beating fast\" and lightheadedness.   -She does have PAF with RVR, but it seems like she is having the symptoms even when she is not in afib (like today). Her prior Zio did show her ave HR even in SR is in the 90s and that the max sinus rate was in the 150s.   -Symptoms seem worse after recent hospital stay and I question if she is somewhat orthostatic when upright and gets reflex tachy.   -Will decreased amlodipine back to 5mg in the AM only. May consider increasing the metoprolol in the future depending on her symptoms.  -Will also get a repeat Gisel nuc to look for any significant ischemia since symptoms have been ongoing and worsening for months.  -Discussed repeat rhythm monitor, but they would prefer to defer for now    2. PAF, likely symptomatic but I don't think it explains all of her symptoms.  -Continue metoprolol and Eliquis as is for now  -Considered an antiarrhythmic but will hold off for now since most of her symptoms are probably not from the afib.    3. HTN. BP looks great in the office today, but might be a bit low for her.    Follow up: Post Gisel stress test.  Of course, the patient was encouraged to contact us sooner with questions or concerns.    43 minutes spent on the date of the encounter doing chart review, review of test results, interpretation of tests, patient visit and documentation     Elisabeth Scanlon PA-C      CURRENT MEDICATIONS:   Current Outpatient Medications   Medication Sig Dispense Refill     amLODIPine (NORVASC) 5 MG tablet Take 1 tablet (5 mg) by mouth 2 times daily 60 " "tablet 0     apixaban ANTICOAGULANT (ELIQUIS) 2.5 MG tablet Take 1 tablet (2.5 mg) by mouth 2 times daily 60 tablet 0     azaTHIOprine (IMURAN) 50 MG tablet Take 1 tablet (50 mg) by mouth daily 90 tablet 0     GENGRAF (BRAND) 25 MG CAPSULE Take 25 mg by mouth 2 times daily At 1000 and 2200       metoprolol succinate ER (TOPROL-XL) 25 MG 24 hr tablet Take 1 tablet (25 mg) by mouth 2 times daily 60 tablet 0     aspirin 81 MG tablet Take by mouth daily 30 tablet      GLUCOSAMINE SULFATE PO Take 1,000 mg by mouth 2 times daily Takes 1000mg in the morning and 1000mg in the evening (Patient not taking: Reported on 9/30/2021)       Vitamin D, Cholecalciferol, 10 MCG (400 UNIT) CHEW Take 800 mg by mouth daily (Patient not taking: Reported on 9/30/2021)         ALLERGIES:  No Known Allergies    ROS:  Skin:  Positive for bruising;scaling   Eyes:  Negative    ENT:  Negative    Respiratory:    shortness of breath;dyspnea on exertion  Cardiovascular:    palpitations;chest pain;Positive for;lightheadedness;dizziness;edema;fatigue  Gastroenterology: Negative    Genitourinary:       Musculoskeletal:  Negative    Neurologic:  Negative    Psychiatric:  Negative    Heme/Lymph/Imm:  Positive for easy bruising  Endocrine:  Negative      PHYSICAL EXAMINATION:    GENERAL:  The patient is a pleasant 86 year old who appears their stated age.  In no apparent distress.  VITAL SIGNS:  /64 (BP Location: Right arm, Patient Position: Sitting, Cuff Size: Adult Regular)   Pulse 82   Ht 1.6 m (5' 3\")   Wt 55.1 kg (121 lb 8 oz)   SpO2 94%   BMI 21.52 kg/m      RESPIRATORY:  Breathing is nonlabored.  Lungs are clear to auscultation bilaterally.   CARDIAC:  RRR, no murmur  EXTREMITIES:  Trace BLE edema  NEUROLOGIC:  Alert and oriented.    DATA REVIEWED:    Prior hospital records and testing and prior Zio Patch monitor. EKG today as above.       Thank you for allowing me to participate in the care of your patient.      Sincerely,     Elisabeth " MARIANGEL Scanlon PA-C     [facility]  cc:   Lazaro Reynaga MD  [address] [place] [id number]

## 2021-10-04 NOTE — TELEPHONE ENCOUNTER
Interim home health  OT eval  OT 1X week for one week  For care recommendations    OK to leave detailed message   Mariye- [phone]

## 2021-10-27 NOTE — PROGRESS NOTES
"SERVICE DATE: 10/27/2021     Primary Cardiologist: Dr Reynaga saw the patient for her initial hospital consult    REASON FOR VISIT:  Cande Baird presents for stress test follow up.    HISTORY OF PRESENT ILLNESS/ASSESSMENT AND PLAN:  Cardiovascular and pertinent history includes HTN, CKD with hx of kidney transplant in 1997 (baseline creatinine 1.4 to 1.5) and recently dx PAF.     She was hospitalized at the [facility] from 8/24 to 8/26 with pyuria secondary to pyelonephritis, nonoliguric acute kidney injury, and hypertensive urgency.  She was started on amlodipine 2.5 mg daily for her hypertension, and upon discharge, a 5d Zio monitor was placed. This showed paroxysmal atrial fibrillation and PSVT with periods of rapid ventricular rates, with a burden of approximately 22% and longest period being 20 hours. Intermittently, heart rate went up to the 180s, and her symptoms correlated with both the atrial fibrillation and SVT. Unfortunately no follow up occurred.     She then presented to [facility] 9/17 with generalized weakness and palpitations, she was found to be in afib with RVR. Cardiology was consulted and she was seen by Dr Reynaga. TTE showed preserved biventricular systolic function, no significant valve disease, asc aorta was moderately dilated. She was started on metoprolol XL 25mg BID and Eliquis 2.5mg BID (lower dose due to age, body weight and renal disease). Amlodipine increased due to elevated BPs.    I met her 9/30 for ongoing chest symptoms. For several months she had noted her heart \"beating fast\" with even minimal exertion consistently. Doesn't really feel any pain or pressure in the chest. When she is at rest most of the time she feels pretty good although occ she will notice palpitations. Also with exertion she felt rather lightheaded \"like I just need to sit down\", this is worse after her most recent hospital stay. No SOB, mild stable LE edema. She was symptomatic the day I saw her but was in SR " "on EKG in the office. I recommended a Gisel stress test and decreased her amlodipine as I felt some of her symptoms maybe due to orthostatic changes.     The Gisel nuc was negative for inducible myocardial ischemia or infarction and showed preserved LV function. Was in SR at the time of her study.    She continues to feel poorly with the same symptoms as mentioned above. Unfortunately she ran out of her metoprolol and Eliquis this weekend and didn't have any refills available. HR in the 120s in the office today.     1. Exertional symptoms of \"heart beating fast\" and lightheadedness. Difficult situation as she does have PAF with RVR, but it seems like she may not having the symptoms even when she is not in afib (like when I saw her last time). Her prior Zio did show her ave HR even in SR is in the 90s and that the max sinus rate was in the 150s.   -Gisel stress test is normal  -I tried reducing her amlodipine but it doesn't sound like this helped.   -Unfortunately now she has been off her BB for at least 4 days, she could be having some rebound tachy at this time.  -Recommend increasing metoprolol XL to 50mg BID and reevaluating symptoms in 1 week.  -She may need a repeat monitor    2. PAF, likely symptomatic but I am not sure if it explains all of her symptoms as she reports these are present all the time.  -Increased metoprolol XL to 50mg BID  -Will stop amlodipine for now to allow up titration of BB  -If she continues to be symptomatic might consider an antiarrhythmic such as amiodarone to see if this can help differentiate if her symptoms are from the arrhythmia    3. HTN. BP looks great in the office today  -See above about amlodipine and metoprolol    Follow up: 1 week.  Of course, the patient was encouraged to contact us sooner with questions or concerns.    45 minutes spent on the date of the encounter doing chart review, review of test results, interpretation of tests, patient visit and documentation " "    Elisabeth Scanlon PA-C      CURRENT MEDICATIONS:   Current Outpatient Medications   Medication Sig Dispense Refill     amLODIPine (NORVASC) 5 MG tablet Take 1 tablet (5 mg) by mouth daily 90 tablet 3     azaTHIOprine (IMURAN) 50 MG tablet Take 1 tablet (50 mg) by mouth daily 90 tablet 0     GENGRAF (BRAND) 25 MG CAPSULE Take 25 mg by mouth 2 times daily At 1000 and 2200       metoprolol succinate ER (TOPROL-XL) 25 MG 24 hr tablet Take 1 tablet (25 mg) by mouth 2 times daily 60 tablet 0       ALLERGIES:  No Known Allergies    ROS:  Skin:  Positive for bruising   Eyes:  Negative    ENT:  Negative    Respiratory:  Positive for dyspnea on exertion  Cardiovascular:    Positive for;lightheadedness;fatigue  Gastroenterology: Positive for nausea;vomiting;poor appetite  Genitourinary:  Positive for nocturia  Musculoskeletal:  Negative    Neurologic:  Positive for headaches  Psychiatric:  Positive for excessive stress  Heme/Lymph/Imm:  Positive for easy bruising  Endocrine:  Negative      PHYSICAL EXAMINATION:    GENERAL:  The patient is a pleasant 86 year old who appears their stated age.  In no apparent distress.  VITAL SIGNS:  /72   Pulse 120   Ht 1.6 m (5' 3\")   Wt 53.7 kg (118 lb 6.4 oz)   BMI 20.97 kg/m      RESPIRATORY:  Breathing is nonlabored.  Lungs are clear to auscultation bilaterally.   CARDIAC:  Regular tachy rhythm, no murmur  EXTREMITIES:  Trace BLE edema  NEUROLOGIC:  Alert and oriented.  "

## 2021-10-27 NOTE — PATIENT INSTRUCTIONS
Thank you for your [facility] visit today. Your provider has recommended the following:  Medication Changes:  INCREASE metoprolol XL to 50mg TWICE a day  STOP the amlodipine  RESTART eliquis twice a day  Recommendations:  Nothing new   Follow-up:  Cardiology follow up in ~1 week.    Reminder:  Please bring in your current medication list or your medication, over the counter supplements and vitamin bottles as we will review these at each office visit.

## 2021-10-27 NOTE — LETTER
"10/27/2021    [facility]  [address]  [facility] [address]    RE: Cande Baird       Dear Colleague,    I had the pleasure of seeing Cande Baird in the [facility].    SERVICE DATE: 10/27/2021     Primary Cardiologist: Dr Reynaga saw the patient for her initial hospital consult    REASON FOR VISIT:  Cande Baird presents for stress test follow up.    HISTORY OF PRESENT ILLNESS/ASSESSMENT AND PLAN:  Cardiovascular and pertinent history includes HTN, CKD with hx of kidney transplant in 1997 (baseline creatinine 1.4 to 1.5) and recently dx PAF.     She was hospitalized at the [facility] from 8/24 to 8/26 with pyuria secondary to pyelonephritis, nonoliguric acute kidney injury, and hypertensive urgency.  She was started on amlodipine 2.5 mg daily for her hypertension, and upon discharge, a 5d Zio monitor was placed. This showed paroxysmal atrial fibrillation and PSVT with periods of rapid ventricular rates, with a burden of approximately 22% and longest period being 20 hours. Intermittently, heart rate went up to the 180s, and her symptoms correlated with both the atrial fibrillation and SVT. Unfortunately no follow up occurred.     She then presented to [facility] 9/17 with generalized weakness and palpitations, she was found to be in afib with RVR. Cardiology was consulted and she was seen by Dr Reynaga. TTE showed preserved biventricular systolic function, no significant valve disease, asc aorta was moderately dilated. She was started on metoprolol XL 25mg BID and Eliquis 2.5mg BID (lower dose due to age, body weight and renal disease). Amlodipine increased due to elevated BPs.    I met her 9/30 for ongoing chest symptoms. For several months she had noted her heart \"beating fast\" with even minimal exertion consistently. Doesn't really feel any pain or pressure in the chest. When she is at rest most of the time she feels pretty good " "although occ she will notice palpitations. Also with exertion she felt rather lightheaded \"like I just need to sit down\", this is worse after her most recent hospital stay. No SOB, mild stable LE edema. She was symptomatic the day I saw her but was in SR on EKG in the office. I recommended a Gisel stress test and decreased her amlodipine as I felt some of her symptoms maybe due to orthostatic changes.     The Gisel nuc was negative for inducible myocardial ischemia or infarction and showed preserved LV function. Was in SR at the time of her study.    She continues to feel poorly with the same symptoms as mentioned above. Unfortunately she ran out of her metoprolol and Eliquis this weekend and didn't have any refills available. HR in the 120s in the office today.     1. Exertional symptoms of \"heart beating fast\" and lightheadedness. Difficult situation as she does have PAF with RVR, but it seems like she may not having the symptoms even when she is not in afib (like when I saw her last time). Her prior Zio did show her ave HR even in SR is in the 90s and that the max sinus rate was in the 150s.   -Gisel stress test is normal  -I tried reducing her amlodipine but it doesn't sound like this helped.   -Unfortunately now she has been off her BB for at least 4 days, she could be having some rebound tachy at this time.  -Recommend increasing metoprolol XL to 50mg BID and reevaluating symptoms in 1 week.  -She may need a repeat monitor    2. PAF, likely symptomatic but I am not sure if it explains all of her symptoms as she reports these are present all the time.  -Increased metoprolol XL to 50mg BID  -Will stop amlodipine for now to allow up titration of BB  -If she continues to be symptomatic might consider an antiarrhythmic such as amiodarone to see if this can help differentiate if her symptoms are from the arrhythmia    3. HTN. BP looks great in the office today  -See above about amlodipine and metoprolol    Follow up: 1 " "week.  Of course, the patient was encouraged to contact us sooner with questions or concerns.    45 minutes spent on the date of the encounter doing chart review, review of test results, interpretation of tests, patient visit and documentation     Elisabeth Scanlon PA-C      CURRENT MEDICATIONS:   Current Outpatient Medications   Medication Sig Dispense Refill     amLODIPine (NORVASC) 5 MG tablet Take 1 tablet (5 mg) by mouth daily 90 tablet 3     azaTHIOprine (IMURAN) 50 MG tablet Take 1 tablet (50 mg) by mouth daily 90 tablet 0     GENGRAF (BRAND) 25 MG CAPSULE Take 25 mg by mouth 2 times daily At 1000 and 2200       metoprolol succinate ER (TOPROL-XL) 25 MG 24 hr tablet Take 1 tablet (25 mg) by mouth 2 times daily 60 tablet 0       ALLERGIES:  No Known Allergies    ROS:  Skin:  Positive for bruising   Eyes:  Negative    ENT:  Negative    Respiratory:  Positive for dyspnea on exertion  Cardiovascular:    Positive for;lightheadedness;fatigue  Gastroenterology: Positive for nausea;vomiting;poor appetite  Genitourinary:  Positive for nocturia  Musculoskeletal:  Negative    Neurologic:  Positive for headaches  Psychiatric:  Positive for excessive stress  Heme/Lymph/Imm:  Positive for easy bruising  Endocrine:  Negative      PHYSICAL EXAMINATION:    GENERAL:  The patient is a pleasant 86 year old who appears their stated age.  In no apparent distress.  VITAL SIGNS:  /72   Pulse 120   Ht 1.6 m (5' 3\")   Wt 53.7 kg (118 lb 6.4 oz)   BMI 20.97 kg/m      RESPIRATORY:  Breathing is nonlabored.  Lungs are clear to auscultation bilaterally.   CARDIAC:  Regular tachy rhythm, no murmur  EXTREMITIES:  Trace BLE edema  NEUROLOGIC:  Alert and oriented.      Thank you for allowing me to participate in the care of your patient.      Sincerely,     Elisabeth Scanlon PA-C     [facility]  cc:   No referring provider defined for this encounter.        "

## 2021-11-04 NOTE — LETTER
11/4/2021    [facility]  [address] [place] [address]    RE: Cande Baird       Dear Colleague,    I had the pleasure of seeing Cande Baird in the [facility].    Cardiology Clinic Progress Note    Service Date: November 4, 2021    Primary Cardiologist: Dr. Reynaga      Reason for Visit: 1 week follow up     HPI:   I had the pleasure of meeting Ms. Cande Baird in the clinic today. She is a very pleasant 86 year old female with a past medical history notable for hypertension, chronic kidney disease with history of kidney transplant in 1997 (baseline creatinine 1.4 to 1.5) and recently diagnosed paroxysmal atrial fibrillation.     She was hospitalized at the [facility] from 8/24 to 8/26 with pyuria secondary to pyelonephritis, nonoliguric acute kidney injury, and hypertensive urgency.  She was started on amlodipine 2.5 mg daily for her hypertension, and upon discharge, a 5-day Zio patch monitor was placed. This showed paroxysmal atrial fibrillation and PSVT with periods of rapid ventricular rates, with a burden of approximately 22% and longest episode lasting 20 hours. Intermittently, heart rate went up to the 180s, and her symptoms correlated with both the atrial fibrillation and SVT. Unfortunately no follow up occurred.      She then presented to [facility] on 9/17/21 with generalized weakness and palpitations and was found to be in A.Fib with RVR. Cardiology was consulted and she was seen by Dr eRynaga. TTE showed preserved biventricular systolic function, no significant valve disease, and moderately dilated ascending aorta. She was started on metoprolol XL 25mg BID and Eliquis 2.5mg BID (lower dose due to age, body weight and renal disease). Amlodipine increased due to elevated BPs.     The patient later met with my colleague Elisabeth Scanlon PA-C in the clinic in follow-up of 9/30 for ongoing  "chest symptoms. For several months she had noted her heart \"beating fast\" with even minimal exertion consistently. Doesn't really feel any pain or pressure in the chest. When she is at rest most of the time she feels pretty good although occ she will notice palpitations. Also with exertion she felt rather lightheaded \"like I just need to sit down\", this is worse after her most recent hospital stay. No SOB, mild stable LE edema. She was symptomatic the day she was seen but was in sinus rhythm on EKG in the office.  A Lexiscan nuclear stress test was recommended and amlodipine was decreased as it was felt that some of her symptoms may be due to orthostatic changes.      Lexiscan nuclear stress test was negative for inducible myocardial ischemia or infarction and showed preserved LV function. She was then seen in follow-up on 10/27/2021 and unfortunately continues to report feeling poorly with the same symptoms as mentioned above. Unfortunately she ran out of her metoprolol and Eliquis the weekend prior and didn't have any refills available.  Her heart rate was in the 120s in the office as a result. It was recommended that she restart and increase the dose of her metoprolol to 50 mg twice daily.  Low-dose amlodipine was increased to allow for up titration of the beta-blocker.     Today, Cande presents to the clinic accompanied by her daughter in follow up of for reassessment after increasing the dose of her metoprolol.  She tells me that she has been feeling a bit better since increasing metoprolol with less frequent episodes of heart racing and lightheadedness. She still notes that she occasionally will feel mildly unsteady when she is up and walking around.  It seems as though there may be a mild component of some orthostatic changes as sometimes seem to occur when she is getting up initially after sitting down. She has not fallen or had any presyncopal or syncopal episodes. She otherwise has been feeling generally " well and denies symptoms of chest pain, shortness of breath, orthopnea, PND, or lower extremity edema.     ASSESSMENT AND PLAN:  1.  Paroxysmal atrial fibrillation  - Likely symptomatic at times with occasional episodes of heart racing and lightheadedness particularly when exerting herself. A Lexiscan nuclear stress test was completed and was negative as outlined above. Her symptoms are reportedly improved after increasing metoprolol.    - We discussed options for management of atrial fibrillation including rhythm versus rate control. Reviewed the option of another event monitor for further evaluation of her symptomatic episodes to see if these are aligning with periods of atrial fibrillation and to assess her rate control.  She did already wear a monitor in August which showed around 22% burden of atrial fibrillation with an average heart rate in the 90s. However, this was before initiation of any beta-blocker. The patient feels that her symptoms are now improved and declined going through the hassle of wearing another monitor at this time.   - If she continues to be symptomatic in the future, we could consider an antiarrhythmic such as amiodarone.  - For the time being, will continue with rate control with metoprolol succinate 50 mg twice daily and anticoagulation with apixaban 2.5 mg twice daily.     2. Hypertension  -Blood pressure was initially mildly of in the clinic today but improved on a recheck at 128/75.  We will continue with metoprolol without changes for the time being.    Thank you for the opportunity to participate in this pleasant patient's care.  I will plan to have her follow-up with Dr. Reynaga in about 3 months.  I encouraged the patient or daughter to call the clinic with questions or concerns in the meantime, and we would be happy to see her sooner if needed.     35 total minutes was spent today including chart review, precharting, history and exam, post visit documentation, and reviewing studies  as outlined above.     AMEE Ross, CNP   Nurse Practitioner  [facility]  Pager: [phone]  Text Page  (8am - 5pm, M-F)    Orders this Visit:  No orders of the defined types were placed in this encounter.    No orders of the defined types were placed in this encounter.    There are no discontinued medications.  Encounter Diagnosis   Name Primary?     Atrial fibrillation with RVR (H)        CURRENT MEDICATIONS:  Current Outpatient Medications   Medication Sig Dispense Refill     apixaban ANTICOAGULANT (ELIQUIS ANTICOAGULANT) 2.5 MG tablet Take 1 tablet (2.5 mg) by mouth 2 times daily 180 tablet 3     azaTHIOprine (IMURAN) 50 MG tablet Take 1 tablet (50 mg) by mouth daily 90 tablet 0     GENGRAF (BRAND) 25 MG CAPSULE Take 25 mg by mouth 2 times daily At 1000 and 2200       metoprolol succinate ER (TOPROL-XL) 50 MG 24 hr tablet Take 1 tablet (50 mg) by mouth 2 times daily 180 tablet 3       ALLERGIES  No Known Allergies    PAST MEDICAL, SURGICAL, FAMILY HISTORY:  History was reviewed and updated as needed, see medical record.    SOCIAL HISTORY:  Social History     Socioeconomic History     Marital status:      Spouse name: Not on file     Number of children: 2     Years of education: Not on file     Highest education level: Not on file   Occupational History     Not on file   Tobacco Use     Smoking status: Former Smoker     Packs/day: 0.00     Years: 0.00     Pack years: 0.00     Types: Cigarettes     Quit date: 1988     Years since quittin.4     Smokeless tobacco: Former User     Quit date: 10/17/1987   Vaping Use     Vaping Use: Never used   Substance and Sexual Activity     Alcohol use: Yes     Comment: 1 or 2 glasses of wine a week or less     Drug use: No     Sexual activity: Not Currently     Partners: Male   Other Topics Concern     Parent/sibling w/ CABG, MI or angioplasty before 65F 55M? No   Social History Narrative     Not on file     Social Determinants of  "Health     Financial Resource Strain:      Difficulty of Paying Living Expenses:    Food Insecurity:      Worried About Running Out of Food in the Last Year:      Ran Out of Food in the Last Year:    Transportation Needs:      Lack of Transportation (Medical):      Lack of Transportation (Non-Medical):    Physical Activity:      Days of Exercise per Week:      Minutes of Exercise per Session:    Stress:      Feeling of Stress :    Social Connections:      Frequency of Communication with Friends and Family:      Frequency of Social Gatherings with Friends and Family:      Attends Scientologist Services:      Active Member of Clubs or Organizations:      Attends Club or Organization Meetings:      Marital Status:    Intimate Partner Violence:      Fear of Current or Ex-Partner:      Emotionally Abused:      Physically Abused:      Sexually Abused:        Review of Systems:  Skin:        Eyes:  Negative    ENT:  Negative    Respiratory:  Negative    Cardiovascular:    dizziness;Positive for  Gastroenterology: Negative    Genitourinary:  Negative    Musculoskeletal:  Negative    Neurologic:  Negative    Psychiatric:  Positive for sleep disturbances  Heme/Lymph/Imm:  Positive for easy bruising  Endocrine:  Negative       Physical Exam:  Vitals: BP (!) 157/89 (BP Location: Right arm, Patient Position: Sitting, Cuff Size: Adult Regular)   Pulse 69   Ht 1.6 m (5' 3\")   Wt 54.7 kg (120 lb 9.6 oz)   SpO2 97%   BMI 21.36 kg/m     Wt Readings from Last 4 Encounters:   11/04/21 54.7 kg (120 lb 9.6 oz)   10/27/21 53.7 kg (118 lb 6.4 oz)   09/30/21 55.1 kg (121 lb 8 oz)   09/27/21 55.8 kg (123 lb)     CONSTITUTIONAL: Appears her stated age, well nourished, and in no acute distress.  HEENT: Pupils equal, round. Sclerae nonicteric.    C/V:  Regular rate and rhythm, normal S1 and S2, no S3 or S4, no murmur, rub or gallop.   RESP: Respirations are unlabored. Lungs are clear to auscultation bilaterally without wheezing, rales, or " rhonchi.  EXTREM: No clubbing, cyanosis, or lower extremity edema bilaterally.   NEURO: Alert and oriented, cooperative. Gait steady. No gross focal deficits.   PSYCH: Affect appropriate. Mentation normal. Responds to questions appropriately.  SKIN: Warm and dry. No apparent rashes or bruising.    Recent Lab Results:  LIPID RESULTS:  Lab Results   Component Value Date    CHOL 220 (H) 08/15/2017    HDL 79 08/15/2017     (H) 08/15/2017    TRIG 139 08/15/2017     LIVER ENZYME RESULTS:  Lab Results   Component Value Date    AST 39 09/17/2021    AST 15 01/14/2020    ALT 33 09/17/2021    ALT 14 01/14/2020     CBC RESULTS:  Lab Results   Component Value Date    WBC 6.5 09/19/2021    WBC 9.1 01/14/2020    RBC 4.03 09/19/2021    RBC 4.42 01/14/2020    HGB 11.9 09/19/2021    HGB 13.3 01/14/2020    HCT 37.9 09/19/2021    HCT 42.6 01/14/2020    MCV 94 09/19/2021    MCV 96 01/14/2020    MCH 29.5 09/19/2021    MCH 30.1 01/14/2020    MCHC 31.4 (L) 09/19/2021    MCHC 31.2 (L) 01/14/2020    RDW 15.6 (H) 09/19/2021    RDW 15.1 (H) 01/14/2020     09/19/2021     01/14/2020     BMP RESULTS:  Lab Results   Component Value Date     09/19/2021     (L) 01/14/2020    POTASSIUM 3.7 09/19/2021    POTASSIUM 4.4 01/14/2020    CHLORIDE 104 09/19/2021    CHLORIDE 96 01/14/2020    CO2 25 09/19/2021    CO2 28 01/14/2020    ANIONGAP 7 09/19/2021    ANIONGAP 8 01/14/2020    GLC 91 09/19/2021     (H) 01/14/2020    BUN 18 09/19/2021    BUN 15 01/14/2020    CR 1.51 (H) 09/19/2021    CR 0.79 01/14/2020    GFRESTIMATED 32 (L) 09/19/2021    GFRESTIMATED 70 01/14/2020    GFRESTBLACK 81 01/14/2020    LETI 9.0 09/19/2021    LETI 9.6 01/14/2020      This note was completed in part using Dragon voice recognition software. Although reviewed after completion, some word and grammatical errors may occur.      Andres Ashley NP   [facility]      cc:   Elisabeth VINCENT  DAVI Scanlon  [facility]  [address] [place] [address]

## 2021-11-04 NOTE — PATIENT INSTRUCTIONS
Thank you for your visit with the [facility] today.    Today's plan:   1. Continue with your current medications for now.  2. Follow up with Dr. Reynaga in about 3 months.    If you have questions or concerns, please do not hesitate to call my nurse, Yamilex, at [phone].     Scheduling phone number: [phone]    It was a pleasure seeing you today!     AMEE Ross, CNP  Nurse Practitioner  [facility]  November 4, 2021  ________________________________________________________

## 2021-11-04 NOTE — PROGRESS NOTES
"  Cardiology Clinic Progress Note    Service Date: November 4, 2021    Primary Cardiologist: Dr. Reynaga      Reason for Visit: 1 week follow up     HPI:   I had the pleasure of meeting Ms. Cande Baird in the clinic today. She is a very pleasant 86 year old female with a past medical history notable for hypertension, chronic kidney disease with history of kidney transplant in 1997 (baseline creatinine 1.4 to 1.5) and recently diagnosed paroxysmal atrial fibrillation.     She was hospitalized at the [facility] from 8/24 to 8/26 with pyuria secondary to pyelonephritis, nonoliguric acute kidney injury, and hypertensive urgency.  She was started on amlodipine 2.5 mg daily for her hypertension, and upon discharge, a 5-day Zio patch monitor was placed. This showed paroxysmal atrial fibrillation and PSVT with periods of rapid ventricular rates, with a burden of approximately 22% and longest episode lasting 20 hours. Intermittently, heart rate went up to the 180s, and her symptoms correlated with both the atrial fibrillation and SVT. Unfortunately no follow up occurred.      She then presented to [facility] on 9/17/21 with generalized weakness and palpitations and was found to be in A.Fib with RVR. Cardiology was consulted and she was seen by Dr Reynaga. TTE showed preserved biventricular systolic function, no significant valve disease, and moderately dilated ascending aorta. She was started on metoprolol XL 25mg BID and Eliquis 2.5mg BID (lower dose due to age, body weight and renal disease). Amlodipine increased due to elevated BPs.     The patient later met with my colleague Elisabeth Scanlon PA-C in the clinic in follow-up of 9/30 for ongoing chest symptoms. For several months she had noted her heart \"beating fast\" with even minimal exertion consistently. Doesn't really feel any pain or pressure in the chest. When she is at rest most of the time she feels pretty good although occ she " "will notice palpitations. Also with exertion she felt rather lightheaded \"like I just need to sit down\", this is worse after her most recent hospital stay. No SOB, mild stable LE edema. She was symptomatic the day she was seen but was in sinus rhythm on EKG in the office.  A Lexiscan nuclear stress test was recommended and amlodipine was decreased as it was felt that some of her symptoms may be due to orthostatic changes.      Lexiscan nuclear stress test was negative for inducible myocardial ischemia or infarction and showed preserved LV function. She was then seen in follow-up on 10/27/2021 and unfortunately continues to report feeling poorly with the same symptoms as mentioned above. Unfortunately she ran out of her metoprolol and Eliquis the weekend prior and didn't have any refills available.  Her heart rate was in the 120s in the office as a result. It was recommended that she restart and increase the dose of her metoprolol to 50 mg twice daily.  Low-dose amlodipine was increased to allow for up titration of the beta-blocker.     Today, Cande presents to the clinic accompanied by her daughter in follow up of for reassessment after increasing the dose of her metoprolol.  She tells me that she has been feeling a bit better since increasing metoprolol with less frequent episodes of heart racing and lightheadedness. She still notes that she occasionally will feel mildly unsteady when she is up and walking around.  It seems as though there may be a mild component of some orthostatic changes as sometimes seem to occur when she is getting up initially after sitting down. She has not fallen or had any presyncopal or syncopal episodes. She otherwise has been feeling generally well and denies symptoms of chest pain, shortness of breath, orthopnea, PND, or lower extremity edema.     ASSESSMENT AND PLAN:  1.  Paroxysmal atrial fibrillation  - Likely symptomatic at times with occasional episodes of heart racing and " lightheadedness particularly when exerting herself. A Lexiscan nuclear stress test was completed and was negative as outlined above. Her symptoms are reportedly improved after increasing metoprolol.    - We discussed options for management of atrial fibrillation including rhythm versus rate control. Reviewed the option of another event monitor for further evaluation of her symptomatic episodes to see if these are aligning with periods of atrial fibrillation and to assess her rate control.  She did already wear a monitor in August which showed around 22% burden of atrial fibrillation with an average heart rate in the 90s. However, this was before initiation of any beta-blocker. The patient feels that her symptoms are now improved and declined going through the hassle of wearing another monitor at this time.   - If she continues to be symptomatic in the future, we could consider an antiarrhythmic such as amiodarone.  - For the time being, will continue with rate control with metoprolol succinate 50 mg twice daily and anticoagulation with apixaban 2.5 mg twice daily.     2. Hypertension  -Blood pressure was initially mildly of in the clinic today but improved on a recheck at 128/75.  We will continue with metoprolol without changes for the time being.    Thank you for the opportunity to participate in this pleasant patient's care.  I will plan to have her follow-up with Dr. Reynaga in about 3 months.  I encouraged the patient or daughter to call the clinic with questions or concerns in the meantime, and we would be happy to see her sooner if needed.     35 total minutes was spent today including chart review, precharting, history and exam, post visit documentation, and reviewing studies as outlined above.     AMEE Ross, CNP   Nurse Practitioner  [facility] [place] [facility]  Pager: [phone]  Text Page  (8am - 5pm, M-F)    Orders this Visit:  No orders of the defined types were placed in this encounter.    No  orders of the defined types were placed in this encounter.    There are no discontinued medications.  Encounter Diagnosis   Name Primary?     Atrial fibrillation with RVR (H)        CURRENT MEDICATIONS:  Current Outpatient Medications   Medication Sig Dispense Refill     apixaban ANTICOAGULANT (ELIQUIS ANTICOAGULANT) 2.5 MG tablet Take 1 tablet (2.5 mg) by mouth 2 times daily 180 tablet 3     azaTHIOprine (IMURAN) 50 MG tablet Take 1 tablet (50 mg) by mouth daily 90 tablet 0     GENGRAF (BRAND) 25 MG CAPSULE Take 25 mg by mouth 2 times daily At 1000 and 2200       metoprolol succinate ER (TOPROL-XL) 50 MG 24 hr tablet Take 1 tablet (50 mg) by mouth 2 times daily 180 tablet 3       ALLERGIES  No Known Allergies    PAST MEDICAL, SURGICAL, FAMILY HISTORY:  History was reviewed and updated as needed, see medical record.    SOCIAL HISTORY:  Social History     Socioeconomic History     Marital status:      Spouse name: Not on file     Number of children: 2     Years of education: Not on file     Highest education level: Not on file   Occupational History     Not on file   Tobacco Use     Smoking status: Former Smoker     Packs/day: 0.00     Years: 0.00     Pack years: 0.00     Types: Cigarettes     Quit date: 1988     Years since quittin.4     Smokeless tobacco: Former User     Quit date: 10/17/1987   Vaping Use     Vaping Use: Never used   Substance and Sexual Activity     Alcohol use: Yes     Comment: 1 or 2 glasses of wine a week or less     Drug use: No     Sexual activity: Not Currently     Partners: Male   Other Topics Concern     Parent/sibling w/ CABG, MI or angioplasty before 65F 55M? No   Social History Narrative     Not on file     Social Determinants of Health     Financial Resource Strain:      Difficulty of Paying Living Expenses:    Food Insecurity:      Worried About Running Out of Food in the Last Year:      Ran Out of Food in the Last Year:    Transportation Needs:      Lack of  "Transportation (Medical):      Lack of Transportation (Non-Medical):    Physical Activity:      Days of Exercise per Week:      Minutes of Exercise per Session:    Stress:      Feeling of Stress :    Social Connections:      Frequency of Communication with Friends and Family:      Frequency of Social Gatherings with Friends and Family:      Attends Buddhism Services:      Active Member of Clubs or Organizations:      Attends Club or Organization Meetings:      Marital Status:    Intimate Partner Violence:      Fear of Current or Ex-Partner:      Emotionally Abused:      Physically Abused:      Sexually Abused:        Review of Systems:  Skin:        Eyes:  Negative    ENT:  Negative    Respiratory:  Negative    Cardiovascular:    dizziness;Positive for  Gastroenterology: Negative    Genitourinary:  Negative    Musculoskeletal:  Negative    Neurologic:  Negative    Psychiatric:  Positive for sleep disturbances  Heme/Lymph/Imm:  Positive for easy bruising  Endocrine:  Negative       Physical Exam:  Vitals: BP (!) 157/89 (BP Location: Right arm, Patient Position: Sitting, Cuff Size: Adult Regular)   Pulse 69   Ht 1.6 m (5' 3\")   Wt 54.7 kg (120 lb 9.6 oz)   SpO2 97%   BMI 21.36 kg/m     Wt Readings from Last 4 Encounters:   11/04/21 54.7 kg (120 lb 9.6 oz)   10/27/21 53.7 kg (118 lb 6.4 oz)   09/30/21 55.1 kg (121 lb 8 oz)   09/27/21 55.8 kg (123 lb)     CONSTITUTIONAL: Appears her stated age, well nourished, and in no acute distress.  HEENT: Pupils equal, round. Sclerae nonicteric.    C/V:  Regular rate and rhythm, normal S1 and S2, no S3 or S4, no murmur, rub or gallop.   RESP: Respirations are unlabored. Lungs are clear to auscultation bilaterally without wheezing, rales, or rhonchi.  EXTREM: No clubbing, cyanosis, or lower extremity edema bilaterally.   NEURO: Alert and oriented, cooperative. Gait steady. No gross focal deficits.   PSYCH: Affect appropriate. Mentation normal. Responds to questions " appropriately.  SKIN: Warm and dry. No apparent rashes or bruising.    Recent Lab Results:  LIPID RESULTS:  Lab Results   Component Value Date    CHOL 220 (H) 08/15/2017    HDL 79 08/15/2017     (H) 08/15/2017    TRIG 139 08/15/2017     LIVER ENZYME RESULTS:  Lab Results   Component Value Date    AST 39 09/17/2021    AST 15 01/14/2020    ALT 33 09/17/2021    ALT 14 01/14/2020     CBC RESULTS:  Lab Results   Component Value Date    WBC 6.5 09/19/2021    WBC 9.1 01/14/2020    RBC 4.03 09/19/2021    RBC 4.42 01/14/2020    HGB 11.9 09/19/2021    HGB 13.3 01/14/2020    HCT 37.9 09/19/2021    HCT 42.6 01/14/2020    MCV 94 09/19/2021    MCV 96 01/14/2020    MCH 29.5 09/19/2021    MCH 30.1 01/14/2020    MCHC 31.4 (L) 09/19/2021    MCHC 31.2 (L) 01/14/2020    RDW 15.6 (H) 09/19/2021    RDW 15.1 (H) 01/14/2020     09/19/2021     01/14/2020     BMP RESULTS:  Lab Results   Component Value Date     09/19/2021     (L) 01/14/2020    POTASSIUM 3.7 09/19/2021    POTASSIUM 4.4 01/14/2020    CHLORIDE 104 09/19/2021    CHLORIDE 96 01/14/2020    CO2 25 09/19/2021    CO2 28 01/14/2020    ANIONGAP 7 09/19/2021    ANIONGAP 8 01/14/2020    GLC 91 09/19/2021     (H) 01/14/2020    BUN 18 09/19/2021    BUN 15 01/14/2020    CR 1.51 (H) 09/19/2021    CR 0.79 01/14/2020    GFRESTIMATED 32 (L) 09/19/2021    GFRESTIMATED 70 01/14/2020    GFRESTBLACK 81 01/14/2020    LETI 9.0 09/19/2021    LETI 9.6 01/14/2020      CC  ALISE CharlesC  [address] [place]  [place] [address]    This note was completed in part using Dragon voice recognition software. Although reviewed after completion, some word and grammatical errors may occur.

## 2021-12-16 NOTE — TELEPHONE ENCOUNTER
Patient calling.     Patient reports Dr. Baird left a message for her earlier this week, on Tuesday, stating for patient to contact her regarding a CT scan.     Patient informed that Dr. Baird is out of the clinic today and returns to clinic on Monday, 12/20/2021.     Patient reports she would like to call back on Monday when Dr. Baird is back in clinic.     I do not see any orders or recent results for a CT scan or recent encounters.     Routing to Dr. Baird as MELISSA.     Whitley AZEVEDO RN   [facility]

## 2021-12-20 NOTE — TELEPHONE ENCOUNTER
Patient advised Dr. Baird does not know what the call would have been regarding. Patient verbalizes understanding, nothing further needed.    Merari Reynoso RN  [facility]

## 2022-01-01 ENCOUNTER — APPOINTMENT (OUTPATIENT)
Dept: PHYSICAL THERAPY | Facility: CLINIC | Age: 87
DRG: 481 | End: 2022-01-01
Payer: COMMERCIAL

## 2022-01-01 ENCOUNTER — ANESTHESIA (OUTPATIENT)
Dept: SURGERY | Facility: CLINIC | Age: 87
DRG: 481 | End: 2022-01-01
Payer: COMMERCIAL

## 2022-01-01 ENCOUNTER — HOSPITAL ENCOUNTER (INPATIENT)
Facility: CLINIC | Age: 87
LOS: 12 days | Discharge: HOSPICE | DRG: 481 | End: 2022-05-02
Attending: EMERGENCY MEDICINE | Admitting: HOSPITALIST
Payer: COMMERCIAL

## 2022-01-01 ENCOUNTER — DOCUMENTATION ONLY (OUTPATIENT)
Dept: OTHER | Facility: CLINIC | Age: 87
End: 2022-01-01
Payer: COMMERCIAL

## 2022-01-01 ENCOUNTER — APPOINTMENT (OUTPATIENT)
Dept: GENERAL RADIOLOGY | Facility: CLINIC | Age: 87
DRG: 481 | End: 2022-01-01
Attending: HOSPITALIST
Payer: COMMERCIAL

## 2022-01-01 ENCOUNTER — APPOINTMENT (OUTPATIENT)
Dept: CARDIOLOGY | Facility: CLINIC | Age: 87
DRG: 481 | End: 2022-01-01
Attending: HOSPITALIST
Payer: COMMERCIAL

## 2022-01-01 ENCOUNTER — APPOINTMENT (OUTPATIENT)
Dept: ULTRASOUND IMAGING | Facility: CLINIC | Age: 87
DRG: 481 | End: 2022-01-01
Attending: INTERNAL MEDICINE
Payer: COMMERCIAL

## 2022-01-01 ENCOUNTER — OFFICE VISIT (OUTPATIENT)
Dept: CARDIOLOGY | Facility: CLINIC | Age: 87
End: 2022-01-01
Attending: NURSE PRACTITIONER
Payer: COMMERCIAL

## 2022-01-01 ENCOUNTER — PREP FOR PROCEDURE (OUTPATIENT)
Dept: OTHER | Facility: CLINIC | Age: 87
End: 2022-01-01

## 2022-01-01 ENCOUNTER — APPOINTMENT (OUTPATIENT)
Dept: GENERAL RADIOLOGY | Facility: CLINIC | Age: 87
DRG: 481 | End: 2022-01-01
Attending: EMERGENCY MEDICINE
Payer: COMMERCIAL

## 2022-01-01 ENCOUNTER — ANESTHESIA EVENT (OUTPATIENT)
Dept: SURGERY | Facility: CLINIC | Age: 87
DRG: 481 | End: 2022-01-01
Payer: COMMERCIAL

## 2022-01-01 ENCOUNTER — APPOINTMENT (OUTPATIENT)
Dept: CT IMAGING | Facility: CLINIC | Age: 87
DRG: 481 | End: 2022-01-01
Attending: EMERGENCY MEDICINE
Payer: COMMERCIAL

## 2022-01-01 ENCOUNTER — HEALTH MAINTENANCE LETTER (OUTPATIENT)
Age: 87
End: 2022-01-01

## 2022-01-01 VITALS
HEIGHT: 63 IN | HEART RATE: 70 BPM | WEIGHT: 119 LBS | DIASTOLIC BLOOD PRESSURE: 80 MMHG | SYSTOLIC BLOOD PRESSURE: 138 MMHG | BODY MASS INDEX: 21.09 KG/M2 | OXYGEN SATURATION: 99 %

## 2022-01-01 VITALS
BODY MASS INDEX: 24.9 KG/M2 | HEART RATE: 77 BPM | RESPIRATION RATE: 16 BRPM | OXYGEN SATURATION: 95 % | TEMPERATURE: 99.1 F | WEIGHT: 126.8 LBS | HEIGHT: 60 IN | DIASTOLIC BLOOD PRESSURE: 55 MMHG | SYSTOLIC BLOOD PRESSURE: 122 MMHG

## 2022-01-01 DIAGNOSIS — R53.1 GENERALIZED WEAKNESS: ICD-10-CM

## 2022-01-01 DIAGNOSIS — I48.91 ATRIAL FIBRILLATION, UNSPECIFIED TYPE (H): ICD-10-CM

## 2022-01-01 DIAGNOSIS — S72.145A CLOSED NONDISPLACED INTERTROCHANTERIC FRACTURE OF LEFT FEMUR, INITIAL ENCOUNTER (H): Primary | ICD-10-CM

## 2022-01-01 DIAGNOSIS — Z79.01 ANTICOAGULATED: ICD-10-CM

## 2022-01-01 DIAGNOSIS — I48.91 ATRIAL FIBRILLATION WITH RVR (H): ICD-10-CM

## 2022-01-01 DIAGNOSIS — I44.1 SECOND DEGREE AV BLOCK, MOBITZ TYPE I: ICD-10-CM

## 2022-01-01 DIAGNOSIS — Z94.0 HISTORY OF KIDNEY TRANSPLANT: ICD-10-CM

## 2022-01-01 DIAGNOSIS — I10 ESSENTIAL HYPERTENSION: ICD-10-CM

## 2022-01-01 DIAGNOSIS — S72.142A CLOSED INTERTROCHANTERIC FRACTURE OF HIP, LEFT, INITIAL ENCOUNTER (H): ICD-10-CM

## 2022-01-01 DIAGNOSIS — Z94.0 S/P KIDNEY TRANSPLANT: ICD-10-CM

## 2022-01-01 DIAGNOSIS — Z51.5 HOSPICE CARE PATIENT: ICD-10-CM

## 2022-01-01 DIAGNOSIS — I48.0 PAF (PAROXYSMAL ATRIAL FIBRILLATION) (H): Primary | ICD-10-CM

## 2022-01-01 LAB
ABO/RH(D): NORMAL
ALBUMIN UR-MCNC: 200 MG/DL
ANION GAP SERPL CALCULATED.3IONS-SCNC: 10 MMOL/L (ref 3–14)
ANION GAP SERPL CALCULATED.3IONS-SCNC: 6 MMOL/L (ref 3–14)
ANION GAP SERPL CALCULATED.3IONS-SCNC: 7 MMOL/L (ref 3–14)
ANION GAP SERPL CALCULATED.3IONS-SCNC: 8 MMOL/L (ref 3–14)
ANTIBODY SCREEN: NEGATIVE
APPEARANCE UR: ABNORMAL
ATRIAL RATE - MUSE: 82 BPM
BACTERIA #/AREA URNS HPF: ABNORMAL /HPF
BACTERIA UR CULT: ABNORMAL
BASOPHILS # BLD AUTO: 0 10E3/UL (ref 0–0.2)
BASOPHILS NFR BLD AUTO: 0 %
BILIRUB UR QL STRIP: NEGATIVE
BUN SERPL-MCNC: 44 MG/DL (ref 7–30)
BUN SERPL-MCNC: 53 MG/DL (ref 7–30)
BUN SERPL-MCNC: 56 MG/DL (ref 7–30)
BUN SERPL-MCNC: 61 MG/DL (ref 7–30)
BUN SERPL-MCNC: 63 MG/DL (ref 7–30)
BUN SERPL-MCNC: 63 MG/DL (ref 7–30)
BUN SERPL-MCNC: 67 MG/DL (ref 7–30)
CALCIUM SERPL-MCNC: 7.5 MG/DL (ref 8.5–10.1)
CALCIUM SERPL-MCNC: 8 MG/DL (ref 8.5–10.1)
CALCIUM SERPL-MCNC: 8.5 MG/DL (ref 8.5–10.1)
CALCIUM SERPL-MCNC: 8.6 MG/DL (ref 8.5–10.1)
CALCIUM SERPL-MCNC: 8.7 MG/DL (ref 8.5–10.1)
CALCIUM SERPL-MCNC: 8.8 MG/DL (ref 8.5–10.1)
CALCIUM SERPL-MCNC: 8.9 MG/DL (ref 8.5–10.1)
CHLORIDE BLD-SCNC: 103 MMOL/L (ref 94–109)
CHLORIDE BLD-SCNC: 104 MMOL/L (ref 94–109)
CHLORIDE BLD-SCNC: 108 MMOL/L (ref 94–109)
CHLORIDE BLD-SCNC: 108 MMOL/L (ref 94–109)
CHLORIDE BLD-SCNC: 112 MMOL/L (ref 94–109)
CO2 SERPL-SCNC: 16 MMOL/L (ref 20–32)
CO2 SERPL-SCNC: 17 MMOL/L (ref 20–32)
CO2 SERPL-SCNC: 18 MMOL/L (ref 20–32)
CO2 SERPL-SCNC: 19 MMOL/L (ref 20–32)
CO2 SERPL-SCNC: 19 MMOL/L (ref 20–32)
CO2 SERPL-SCNC: 20 MMOL/L (ref 20–32)
CO2 SERPL-SCNC: 23 MMOL/L (ref 20–32)
COLOR UR AUTO: ABNORMAL
CREAT SERPL-MCNC: 2.71 MG/DL (ref 0.52–1.04)
CREAT SERPL-MCNC: 3.31 MG/DL (ref 0.52–1.04)
CREAT SERPL-MCNC: 3.66 MG/DL (ref 0.52–1.04)
CREAT SERPL-MCNC: 3.95 MG/DL (ref 0.52–1.04)
CREAT SERPL-MCNC: 3.95 MG/DL (ref 0.52–1.04)
CREAT SERPL-MCNC: 3.98 MG/DL (ref 0.52–1.04)
CREAT SERPL-MCNC: 4 MG/DL (ref 0.52–1.04)
DEPRECATED CALCIDIOL+CALCIFEROL SERPL-MC: 41 UG/L (ref 20–75)
DIASTOLIC BLOOD PRESSURE - MUSE: NORMAL MMHG
EOSINOPHIL # BLD AUTO: 0.1 10E3/UL (ref 0–0.7)
EOSINOPHIL NFR BLD AUTO: 1 %
ERYTHROCYTE [DISTWIDTH] IN BLOOD BY AUTOMATED COUNT: 14.7 % (ref 10–15)
ERYTHROCYTE [DISTWIDTH] IN BLOOD BY AUTOMATED COUNT: 14.7 % (ref 10–15)
ERYTHROCYTE [DISTWIDTH] IN BLOOD BY AUTOMATED COUNT: 15.3 % (ref 10–15)
ERYTHROCYTE [DISTWIDTH] IN BLOOD BY AUTOMATED COUNT: 15.6 % (ref 10–15)
ERYTHROCYTE [DISTWIDTH] IN BLOOD BY AUTOMATED COUNT: 15.9 % (ref 10–15)
ERYTHROCYTE [DISTWIDTH] IN BLOOD BY AUTOMATED COUNT: 15.9 % (ref 10–15)
ERYTHROCYTE [DISTWIDTH] IN BLOOD BY AUTOMATED COUNT: 16.2 % (ref 10–15)
FERRITIN SERPL-MCNC: 351 NG/ML (ref 8–252)
GFR SERPL CREATININE-BSD FRML MDRD: 10 ML/MIN/1.73M2
GFR SERPL CREATININE-BSD FRML MDRD: 11 ML/MIN/1.73M2
GFR SERPL CREATININE-BSD FRML MDRD: 12 ML/MIN/1.73M2
GFR SERPL CREATININE-BSD FRML MDRD: 13 ML/MIN/1.73M2
GFR SERPL CREATININE-BSD FRML MDRD: 17 ML/MIN/1.73M2
GLUCOSE BLD-MCNC: 100 MG/DL (ref 70–99)
GLUCOSE BLD-MCNC: 108 MG/DL (ref 70–99)
GLUCOSE BLD-MCNC: 108 MG/DL (ref 70–99)
GLUCOSE BLD-MCNC: 113 MG/DL (ref 70–99)
GLUCOSE BLD-MCNC: 143 MG/DL (ref 70–99)
GLUCOSE BLD-MCNC: 95 MG/DL (ref 70–99)
GLUCOSE BLD-MCNC: 99 MG/DL (ref 70–99)
GLUCOSE UR STRIP-MCNC: NEGATIVE MG/DL
HCT VFR BLD AUTO: 23.5 % (ref 35–47)
HCT VFR BLD AUTO: 24.5 % (ref 35–47)
HCT VFR BLD AUTO: 26.1 % (ref 35–47)
HCT VFR BLD AUTO: 27.2 % (ref 35–47)
HCT VFR BLD AUTO: 27.4 % (ref 35–47)
HCT VFR BLD AUTO: 31.2 % (ref 35–47)
HCT VFR BLD AUTO: 31.4 % (ref 35–47)
HGB BLD-MCNC: 10 G/DL (ref 11.7–15.7)
HGB BLD-MCNC: 10 G/DL (ref 11.7–15.7)
HGB BLD-MCNC: 7.3 G/DL (ref 11.7–15.7)
HGB BLD-MCNC: 7.5 G/DL (ref 11.7–15.7)
HGB BLD-MCNC: 8.2 G/DL (ref 11.7–15.7)
HGB BLD-MCNC: 8.4 G/DL (ref 11.7–15.7)
HGB BLD-MCNC: 8.4 G/DL (ref 11.7–15.7)
HGB UR QL STRIP: ABNORMAL
HOLD SPECIMEN: NORMAL
IMM GRANULOCYTES # BLD: 0.1 10E3/UL
IMM GRANULOCYTES NFR BLD: 1 %
INR PPP: 1.41 (ref 0.85–1.15)
INTERPRETATION ECG - MUSE: NORMAL
IRON SATN MFR SERPL: 10 % (ref 15–46)
IRON SERPL-MCNC: 16 UG/DL (ref 35–180)
KETONES UR STRIP-MCNC: NEGATIVE MG/DL
LACTATE SERPL-SCNC: 0.8 MMOL/L (ref 0.7–2)
LACTATE SERPL-SCNC: 1.7 MMOL/L (ref 0.7–2)
LEUKOCYTE ESTERASE UR QL STRIP: ABNORMAL
LVEF ECHO: NORMAL
LYMPHOCYTES # BLD AUTO: 0.7 10E3/UL (ref 0.8–5.3)
LYMPHOCYTES NFR BLD AUTO: 5 %
MAGNESIUM SERPL-MCNC: 1.8 MG/DL (ref 1.6–2.3)
MCH RBC QN AUTO: 30.6 PG (ref 26.5–33)
MCH RBC QN AUTO: 30.8 PG (ref 26.5–33)
MCH RBC QN AUTO: 30.8 PG (ref 26.5–33)
MCH RBC QN AUTO: 30.9 PG (ref 26.5–33)
MCH RBC QN AUTO: 31.3 PG (ref 26.5–33)
MCH RBC QN AUTO: 31.4 PG (ref 26.5–33)
MCH RBC QN AUTO: 31.6 PG (ref 26.5–33)
MCHC RBC AUTO-ENTMCNC: 30.1 G/DL (ref 31.5–36.5)
MCHC RBC AUTO-ENTMCNC: 30.6 G/DL (ref 31.5–36.5)
MCHC RBC AUTO-ENTMCNC: 30.7 G/DL (ref 31.5–36.5)
MCHC RBC AUTO-ENTMCNC: 31.1 G/DL (ref 31.5–36.5)
MCHC RBC AUTO-ENTMCNC: 31.8 G/DL (ref 31.5–36.5)
MCHC RBC AUTO-ENTMCNC: 32.1 G/DL (ref 31.5–36.5)
MCHC RBC AUTO-ENTMCNC: 32.2 G/DL (ref 31.5–36.5)
MCV RBC AUTO: 100 FL (ref 78–100)
MCV RBC AUTO: 100 FL (ref 78–100)
MCV RBC AUTO: 102 FL (ref 78–100)
MCV RBC AUTO: 102 FL (ref 78–100)
MCV RBC AUTO: 97 FL (ref 78–100)
MCV RBC AUTO: 97 FL (ref 78–100)
MCV RBC AUTO: 98 FL (ref 78–100)
MONOCYTES # BLD AUTO: 1.4 10E3/UL (ref 0–1.3)
MONOCYTES NFR BLD AUTO: 11 %
NEUTROPHILS # BLD AUTO: 11.2 10E3/UL (ref 1.6–8.3)
NEUTROPHILS NFR BLD AUTO: 82 %
NITRATE UR QL: NEGATIVE
NRBC # BLD AUTO: 0 10E3/UL
NRBC BLD AUTO-RTO: 0 /100
OSMOLALITY UR: 195 MMOL/KG (ref 100–1200)
P AXIS - MUSE: 60 DEGREES
PH UR STRIP: 5.5 [PH] (ref 5–7)
PHOSPHATE SERPL-MCNC: 4.9 MG/DL (ref 2.5–4.5)
PLATELET # BLD AUTO: 238 10E3/UL (ref 150–450)
PLATELET # BLD AUTO: 241 10E3/UL (ref 150–450)
PLATELET # BLD AUTO: 253 10E3/UL (ref 150–450)
PLATELET # BLD AUTO: 264 10E3/UL (ref 150–450)
PLATELET # BLD AUTO: 295 10E3/UL (ref 150–450)
PLATELET # BLD AUTO: 295 10E3/UL (ref 150–450)
PLATELET # BLD AUTO: 303 10E3/UL (ref 150–450)
POTASSIUM BLD-SCNC: 3.7 MMOL/L (ref 3.4–5.3)
POTASSIUM BLD-SCNC: 3.9 MMOL/L (ref 3.4–5.3)
POTASSIUM BLD-SCNC: 4.2 MMOL/L (ref 3.4–5.3)
POTASSIUM BLD-SCNC: 4.4 MMOL/L (ref 3.4–5.3)
POTASSIUM BLD-SCNC: 4.4 MMOL/L (ref 3.4–5.3)
POTASSIUM BLD-SCNC: 4.6 MMOL/L (ref 3.4–5.3)
POTASSIUM BLD-SCNC: 4.6 MMOL/L (ref 3.4–5.3)
PR INTERVAL - MUSE: 150 MS
QRS DURATION - MUSE: 90 MS
QT - MUSE: 378 MS
QTC - MUSE: 441 MS
R AXIS - MUSE: 51 DEGREES
RBC # BLD AUTO: 2.31 10E6/UL (ref 3.8–5.2)
RBC # BLD AUTO: 2.45 10E6/UL (ref 3.8–5.2)
RBC # BLD AUTO: 2.66 10E6/UL (ref 3.8–5.2)
RBC # BLD AUTO: 2.68 10E6/UL (ref 3.8–5.2)
RBC # BLD AUTO: 2.73 10E6/UL (ref 3.8–5.2)
RBC # BLD AUTO: 3.18 10E6/UL (ref 3.8–5.2)
RBC # BLD AUTO: 3.24 10E6/UL (ref 3.8–5.2)
RBC URINE: 18 /HPF
SARS-COV-2 RNA RESP QL NAA+PROBE: NEGATIVE
SARS-COV-2 RNA RESP QL NAA+PROBE: NEGATIVE
SODIUM SERPL-SCNC: 128 MMOL/L (ref 133–144)
SODIUM SERPL-SCNC: 129 MMOL/L (ref 133–144)
SODIUM SERPL-SCNC: 129 MMOL/L (ref 133–144)
SODIUM SERPL-SCNC: 133 MMOL/L (ref 133–144)
SODIUM SERPL-SCNC: 133 MMOL/L (ref 133–144)
SODIUM SERPL-SCNC: 135 MMOL/L (ref 133–144)
SODIUM SERPL-SCNC: 138 MMOL/L (ref 133–144)
SP GR UR STRIP: 1.01 (ref 1–1.03)
SPECIMEN EXPIRATION DATE: NORMAL
SYSTOLIC BLOOD PRESSURE - MUSE: NORMAL MMHG
T AXIS - MUSE: 60 DEGREES
TIBC SERPL-MCNC: 163 UG/DL (ref 240–430)
UROBILINOGEN UR STRIP-MCNC: NORMAL MG/DL
VENTRICULAR RATE- MUSE: 82 BPM
WBC # BLD AUTO: 13.5 10E3/UL (ref 4–11)
WBC # BLD AUTO: 13.5 10E3/UL (ref 4–11)
WBC # BLD AUTO: 14.6 10E3/UL (ref 4–11)
WBC # BLD AUTO: 15.2 10E3/UL (ref 4–11)
WBC # BLD AUTO: 18.3 10E3/UL (ref 4–11)
WBC # BLD AUTO: 22.9 10E3/UL (ref 4–11)
WBC # BLD AUTO: 24 10E3/UL (ref 4–11)
WBC CLUMPS #/AREA URNS HPF: PRESENT /HPF
WBC URINE: >182 /HPF
YEAST #/AREA URNS HPF: ABNORMAL /HPF

## 2022-01-01 PROCEDURE — 250N000013 HC RX MED GY IP 250 OP 250 PS 637: Performed by: INTERNAL MEDICINE

## 2022-01-01 PROCEDURE — C1713 ANCHOR/SCREW BN/BN,TIS/BN: HCPCS | Performed by: ORTHOPAEDIC SURGERY

## 2022-01-01 PROCEDURE — 250N000011 HC RX IP 250 OP 636: Performed by: HOSPITALIST

## 2022-01-01 PROCEDURE — 370N000017 HC ANESTHESIA TECHNICAL FEE, PER MIN: Performed by: ORTHOPAEDIC SURGERY

## 2022-01-01 PROCEDURE — 250N000011 HC RX IP 250 OP 636: Performed by: INTERNAL MEDICINE

## 2022-01-01 PROCEDURE — 36415 COLL VENOUS BLD VENIPUNCTURE: CPT | Performed by: EMERGENCY MEDICINE

## 2022-01-01 PROCEDURE — 99231 SBSQ HOSP IP/OBS SF/LOW 25: CPT | Performed by: INTERNAL MEDICINE

## 2022-01-01 PROCEDURE — 250N000013 HC RX MED GY IP 250 OP 250 PS 637: Performed by: HOSPITALIST

## 2022-01-01 PROCEDURE — 97161 PT EVAL LOW COMPLEX 20 MIN: CPT | Mod: GP | Performed by: PHYSICAL THERAPIST

## 2022-01-01 PROCEDURE — 82310 ASSAY OF CALCIUM: CPT | Performed by: HOSPITALIST

## 2022-01-01 PROCEDURE — 93306 TTE W/DOPPLER COMPLETE: CPT

## 2022-01-01 PROCEDURE — 99233 SBSQ HOSP IP/OBS HIGH 50: CPT | Performed by: NURSE PRACTITIONER

## 2022-01-01 PROCEDURE — 250N000013 HC RX MED GY IP 250 OP 250 PS 637: Performed by: PHYSICIAN ASSISTANT

## 2022-01-01 PROCEDURE — 99222 1ST HOSP IP/OBS MODERATE 55: CPT | Performed by: INTERNAL MEDICINE

## 2022-01-01 PROCEDURE — 83935 ASSAY OF URINE OSMOLALITY: CPT | Performed by: INTERNAL MEDICINE

## 2022-01-01 PROCEDURE — 85025 COMPLETE CBC W/AUTO DIFF WBC: CPT | Performed by: EMERGENCY MEDICINE

## 2022-01-01 PROCEDURE — 99233 SBSQ HOSP IP/OBS HIGH 50: CPT | Mod: FS | Performed by: INTERNAL MEDICINE

## 2022-01-01 PROCEDURE — 99238 HOSP IP/OBS DSCHRG MGMT 30/<: CPT | Performed by: INTERNAL MEDICINE

## 2022-01-01 PROCEDURE — 85027 COMPLETE CBC AUTOMATED: CPT | Performed by: INTERNAL MEDICINE

## 2022-01-01 PROCEDURE — 83735 ASSAY OF MAGNESIUM: CPT | Performed by: INTERNAL MEDICINE

## 2022-01-01 PROCEDURE — 97110 THERAPEUTIC EXERCISES: CPT | Mod: GP | Performed by: PHYSICAL THERAPIST

## 2022-01-01 PROCEDURE — 36415 COLL VENOUS BLD VENIPUNCTURE: CPT | Performed by: INTERNAL MEDICINE

## 2022-01-01 PROCEDURE — 99223 1ST HOSP IP/OBS HIGH 75: CPT | Performed by: NURSE PRACTITIONER

## 2022-01-01 PROCEDURE — 99233 SBSQ HOSP IP/OBS HIGH 50: CPT | Performed by: INTERNAL MEDICINE

## 2022-01-01 PROCEDURE — 84100 ASSAY OF PHOSPHORUS: CPT | Performed by: INTERNAL MEDICINE

## 2022-01-01 PROCEDURE — 250N000013 HC RX MED GY IP 250 OP 250 PS 637: Performed by: EMERGENCY MEDICINE

## 2022-01-01 PROCEDURE — 96374 THER/PROPH/DIAG INJ IV PUSH: CPT

## 2022-01-01 PROCEDURE — 36415 COLL VENOUS BLD VENIPUNCTURE: CPT | Performed by: HOSPITALIST

## 2022-01-01 PROCEDURE — 80048 BASIC METABOLIC PNL TOTAL CA: CPT | Performed by: INTERNAL MEDICINE

## 2022-01-01 PROCEDURE — 250N000011 HC RX IP 250 OP 636: Performed by: PHYSICIAN ASSISTANT

## 2022-01-01 PROCEDURE — 0QS706Z REPOSITION LEFT UPPER FEMUR WITH INTRAMEDULLARY INTERNAL FIXATION DEVICE, OPEN APPROACH: ICD-10-PCS | Performed by: ORTHOPAEDIC SURGERY

## 2022-01-01 PROCEDURE — 82728 ASSAY OF FERRITIN: CPT | Performed by: INTERNAL MEDICINE

## 2022-01-01 PROCEDURE — 258N000003 HC RX IP 258 OP 636: Performed by: INTERNAL MEDICINE

## 2022-01-01 PROCEDURE — 83605 ASSAY OF LACTIC ACID: CPT | Performed by: INTERNAL MEDICINE

## 2022-01-01 PROCEDURE — 99285 EMERGENCY DEPT VISIT HI MDM: CPT | Mod: 25

## 2022-01-01 PROCEDURE — 36415 COLL VENOUS BLD VENIPUNCTURE: CPT | Performed by: PHYSICIAN ASSISTANT

## 2022-01-01 PROCEDURE — 120N000001 HC R&B MED SURG/OB

## 2022-01-01 PROCEDURE — 272N000001 HC OR GENERAL SUPPLY STERILE: Performed by: ORTHOPAEDIC SURGERY

## 2022-01-01 PROCEDURE — 250N000012 HC RX MED GY IP 250 OP 636 PS 637: Performed by: HOSPITALIST

## 2022-01-01 PROCEDURE — 85027 COMPLETE CBC AUTOMATED: CPT | Performed by: HOSPITALIST

## 2022-01-01 PROCEDURE — 81003 URINALYSIS AUTO W/O SCOPE: CPT | Performed by: INTERNAL MEDICINE

## 2022-01-01 PROCEDURE — 258N000003 HC RX IP 258 OP 636: Performed by: PHYSICIAN ASSISTANT

## 2022-01-01 PROCEDURE — C9803 HOPD COVID-19 SPEC COLLECT: HCPCS

## 2022-01-01 PROCEDURE — 85018 HEMOGLOBIN: CPT | Performed by: INTERNAL MEDICINE

## 2022-01-01 PROCEDURE — 258N000003 HC RX IP 258 OP 636: Performed by: ANESTHESIOLOGY

## 2022-01-01 PROCEDURE — 99232 SBSQ HOSP IP/OBS MODERATE 35: CPT | Performed by: INTERNAL MEDICINE

## 2022-01-01 PROCEDURE — 93971 EXTREMITY STUDY: CPT | Mod: LT

## 2022-01-01 PROCEDURE — 93010 ELECTROCARDIOGRAM REPORT: CPT | Performed by: INTERNAL MEDICINE

## 2022-01-01 PROCEDURE — 250N000011 HC RX IP 250 OP 636: Performed by: ANESTHESIOLOGY

## 2022-01-01 PROCEDURE — 258N000003 HC RX IP 258 OP 636: Performed by: NURSE ANESTHETIST, CERTIFIED REGISTERED

## 2022-01-01 PROCEDURE — 85610 PROTHROMBIN TIME: CPT | Performed by: EMERGENCY MEDICINE

## 2022-01-01 PROCEDURE — 258N000003 HC RX IP 258 OP 636: Performed by: HOSPITALIST

## 2022-01-01 PROCEDURE — 250N000009 HC RX 250: Performed by: ORTHOPAEDIC SURGERY

## 2022-01-01 PROCEDURE — 250N000011 HC RX IP 250 OP 636: Performed by: NURSE ANESTHETIST, CERTIFIED REGISTERED

## 2022-01-01 PROCEDURE — 80048 BASIC METABOLIC PNL TOTAL CA: CPT | Performed by: EMERGENCY MEDICINE

## 2022-01-01 PROCEDURE — 97530 THERAPEUTIC ACTIVITIES: CPT | Mod: GP | Performed by: PHYSICAL THERAPIST

## 2022-01-01 PROCEDURE — 99215 OFFICE O/P EST HI 40 MIN: CPT | Performed by: NURSE PRACTITIONER

## 2022-01-01 PROCEDURE — 250N000011 HC RX IP 250 OP 636: Performed by: EMERGENCY MEDICINE

## 2022-01-01 PROCEDURE — 82306 VITAMIN D 25 HYDROXY: CPT | Performed by: PHYSICIAN ASSISTANT

## 2022-01-01 PROCEDURE — U0005 INFEC AGEN DETEC AMPLI PROBE: HCPCS | Performed by: INTERNAL MEDICINE

## 2022-01-01 PROCEDURE — 710N000009 HC RECOVERY PHASE 1, LEVEL 1, PER MIN: Performed by: ORTHOPAEDIC SURGERY

## 2022-01-01 PROCEDURE — 80051 ELECTROLYTE PANEL: CPT | Performed by: INTERNAL MEDICINE

## 2022-01-01 PROCEDURE — 250N000009 HC RX 250: Performed by: PHYSICIAN ASSISTANT

## 2022-01-01 PROCEDURE — 83550 IRON BINDING TEST: CPT | Performed by: INTERNAL MEDICINE

## 2022-01-01 PROCEDURE — 99232 SBSQ HOSP IP/OBS MODERATE 35: CPT | Performed by: NURSE PRACTITIONER

## 2022-01-01 PROCEDURE — 86901 BLOOD TYPING SEROLOGIC RH(D): CPT | Performed by: PHYSICIAN ASSISTANT

## 2022-01-01 PROCEDURE — 87186 SC STD MICRODIL/AGAR DIL: CPT | Performed by: INTERNAL MEDICINE

## 2022-01-01 PROCEDURE — 93306 TTE W/DOPPLER COMPLETE: CPT | Mod: 26 | Performed by: INTERNAL MEDICINE

## 2022-01-01 PROCEDURE — 85014 HEMATOCRIT: CPT | Performed by: INTERNAL MEDICINE

## 2022-01-01 PROCEDURE — 73502 X-RAY EXAM HIP UNI 2-3 VIEWS: CPT

## 2022-01-01 PROCEDURE — 99222 1ST HOSP IP/OBS MODERATE 55: CPT | Mod: AI | Performed by: HOSPITALIST

## 2022-01-01 PROCEDURE — 70450 CT HEAD/BRAIN W/O DYE: CPT

## 2022-01-01 PROCEDURE — 360N000084 HC SURGERY LEVEL 4 W/ FLUORO, PER MIN: Performed by: ORTHOPAEDIC SURGERY

## 2022-01-01 PROCEDURE — U0005 INFEC AGEN DETEC AMPLI PROBE: HCPCS | Performed by: EMERGENCY MEDICINE

## 2022-01-01 PROCEDURE — C1769 GUIDE WIRE: HCPCS | Performed by: ORTHOPAEDIC SURGERY

## 2022-01-01 PROCEDURE — 999N000179 XR SURGERY CARM FLUORO LESS THAN 5 MIN W STILLS: Mod: TC

## 2022-01-01 PROCEDURE — 999N000141 HC STATISTIC PRE-PROCEDURE NURSING ASSESSMENT: Performed by: ORTHOPAEDIC SURGERY

## 2022-01-01 PROCEDURE — 250N000009 HC RX 250: Performed by: NURSE ANESTHETIST, CERTIFIED REGISTERED

## 2022-01-01 DEVICE — IMPLANTABLE DEVICE: Type: IMPLANTABLE DEVICE | Site: HIP | Status: FUNCTIONAL

## 2022-01-01 DEVICE — IMP SCR SYN TFNA FENESTRATED LAG 85MM 04.038.185S: Type: IMPLANTABLE DEVICE | Site: HIP | Status: FUNCTIONAL

## 2022-01-01 DEVICE — IMP SCR SYN 5.0 TI LOCK T25 STARDRIVE 34MM 04.005.524S: Type: IMPLANTABLE DEVICE | Site: HIP | Status: FUNCTIONAL

## 2022-01-01 RX ORDER — OXYCODONE HYDROCHLORIDE 5 MG/1
5 TABLET ORAL EVERY 4 HOURS PRN
Status: DISCONTINUED | OUTPATIENT
Start: 2022-01-01 | End: 2022-01-01

## 2022-01-01 RX ORDER — ONDANSETRON 4 MG/1
4 TABLET, ORALLY DISINTEGRATING ORAL EVERY 30 MIN PRN
Status: DISCONTINUED | OUTPATIENT
Start: 2022-01-01 | End: 2022-01-01 | Stop reason: HOSPADM

## 2022-01-01 RX ORDER — ONDANSETRON 2 MG/ML
INJECTION INTRAMUSCULAR; INTRAVENOUS PRN
Status: DISCONTINUED | OUTPATIENT
Start: 2022-01-01 | End: 2022-01-01

## 2022-01-01 RX ORDER — HALOPERIDOL 5 MG/ML
1 INJECTION INTRAMUSCULAR
Status: DISCONTINUED | OUTPATIENT
Start: 2022-01-01 | End: 2022-01-01 | Stop reason: HOSPADM

## 2022-01-01 RX ORDER — HYDRALAZINE HYDROCHLORIDE 20 MG/ML
2.5-5 INJECTION INTRAMUSCULAR; INTRAVENOUS EVERY 10 MIN PRN
Status: DISCONTINUED | OUTPATIENT
Start: 2022-01-01 | End: 2022-01-01 | Stop reason: HOSPADM

## 2022-01-01 RX ORDER — FENTANYL CITRATE 50 UG/ML
INJECTION, SOLUTION INTRAMUSCULAR; INTRAVENOUS PRN
Status: DISCONTINUED | OUTPATIENT
Start: 2022-01-01 | End: 2022-01-01

## 2022-01-01 RX ORDER — VITAMIN B COMPLEX
50 TABLET ORAL DAILY
Status: DISCONTINUED | OUTPATIENT
Start: 2022-01-01 | End: 2022-01-01

## 2022-01-01 RX ORDER — ALBUTEROL SULFATE 0.83 MG/ML
2.5 SOLUTION RESPIRATORY (INHALATION) EVERY 4 HOURS PRN
Status: DISCONTINUED | OUTPATIENT
Start: 2022-01-01 | End: 2022-01-01 | Stop reason: HOSPADM

## 2022-01-01 RX ORDER — AMLODIPINE BESYLATE 5 MG/1
5 TABLET ORAL DAILY
Status: ON HOLD | COMMUNITY
Start: 2022-01-01 | End: 2022-01-01

## 2022-01-01 RX ORDER — SODIUM BICARBONATE 650 MG/1
650 TABLET ORAL 3 TIMES DAILY
Status: DISCONTINUED | OUTPATIENT
Start: 2022-01-01 | End: 2022-01-01

## 2022-01-01 RX ORDER — TRAMADOL HYDROCHLORIDE 50 MG/1
25 TABLET ORAL EVERY 6 HOURS PRN
Qty: 20 TABLET | Refills: 0 | Status: SHIPPED | OUTPATIENT
Start: 2022-01-01 | End: 2022-01-01

## 2022-01-01 RX ORDER — BUPIVACAINE HYDROCHLORIDE AND EPINEPHRINE 5; 5 MG/ML; UG/ML
INJECTION, SOLUTION PERINEURAL PRN
Status: DISCONTINUED | OUTPATIENT
Start: 2022-01-01 | End: 2022-01-01 | Stop reason: HOSPADM

## 2022-01-01 RX ORDER — METOPROLOL SUCCINATE 50 MG/1
50 TABLET, EXTENDED RELEASE ORAL DAILY
Status: DISCONTINUED | OUTPATIENT
Start: 2022-01-01 | End: 2022-01-01

## 2022-01-01 RX ORDER — ONDANSETRON 4 MG/1
4 TABLET, ORALLY DISINTEGRATING ORAL EVERY 6 HOURS PRN
Status: DISCONTINUED | OUTPATIENT
Start: 2022-01-01 | End: 2022-01-01

## 2022-01-01 RX ORDER — MAGNESIUM HYDROXIDE 1200 MG/15ML
LIQUID ORAL PRN
Status: DISCONTINUED | OUTPATIENT
Start: 2022-01-01 | End: 2022-01-01 | Stop reason: HOSPADM

## 2022-01-01 RX ORDER — ONDANSETRON 4 MG/1
4 TABLET, ORALLY DISINTEGRATING ORAL EVERY 6 HOURS PRN
Qty: 8 TABLET | Refills: 0 | Status: SHIPPED | OUTPATIENT
Start: 2022-01-01

## 2022-01-01 RX ORDER — ONDANSETRON 2 MG/ML
4 INJECTION INTRAMUSCULAR; INTRAVENOUS EVERY 6 HOURS PRN
Status: DISCONTINUED | OUTPATIENT
Start: 2022-01-01 | End: 2022-01-01 | Stop reason: HOSPADM

## 2022-01-01 RX ORDER — HYDROMORPHONE HYDROCHLORIDE 2 MG/1
1 TABLET ORAL EVERY 4 HOURS PRN
Qty: 10 TABLET | Refills: 0 | Status: SHIPPED | OUTPATIENT
Start: 2022-01-01 | End: 2022-01-01

## 2022-01-01 RX ORDER — AZATHIOPRINE 50 MG/1
50 TABLET ORAL AT BEDTIME
Status: ON HOLD | COMMUNITY
End: 2022-01-01

## 2022-01-01 RX ORDER — SODIUM CHLORIDE, SODIUM LACTATE, POTASSIUM CHLORIDE, CALCIUM CHLORIDE 600; 310; 30; 20 MG/100ML; MG/100ML; MG/100ML; MG/100ML
INJECTION, SOLUTION INTRAVENOUS CONTINUOUS
Status: DISCONTINUED | OUTPATIENT
Start: 2022-01-01 | End: 2022-01-01

## 2022-01-01 RX ORDER — NALOXONE HYDROCHLORIDE 0.4 MG/ML
0.1 INJECTION, SOLUTION INTRAMUSCULAR; INTRAVENOUS; SUBCUTANEOUS
Status: DISCONTINUED | OUTPATIENT
Start: 2022-01-01 | End: 2022-01-01 | Stop reason: HOSPADM

## 2022-01-01 RX ORDER — PHENYLEPHRINE HYDROCHLORIDE 10 MG/ML
INJECTION INTRAVENOUS PRN
Status: DISCONTINUED | OUTPATIENT
Start: 2022-01-01 | End: 2022-01-01

## 2022-01-01 RX ORDER — AZATHIOPRINE 50 MG/1
50 TABLET ORAL AT BEDTIME
Status: DISCONTINUED | OUTPATIENT
Start: 2022-01-01 | End: 2022-01-01

## 2022-01-01 RX ORDER — ACETAMINOPHEN 325 MG/1
975 TABLET ORAL ONCE
Status: COMPLETED | OUTPATIENT
Start: 2022-01-01 | End: 2022-01-01

## 2022-01-01 RX ORDER — TRANEXAMIC ACID 10 MG/ML
1 INJECTION, SOLUTION INTRAVENOUS ONCE
Status: COMPLETED | OUTPATIENT
Start: 2022-01-01 | End: 2022-01-01

## 2022-01-01 RX ORDER — ACETAMINOPHEN 325 MG/1
975 TABLET ORAL EVERY 8 HOURS
Status: DISCONTINUED | OUTPATIENT
Start: 2022-01-01 | End: 2022-01-01

## 2022-01-01 RX ORDER — SALIVA STIMULANT COMB. NO.3
1 SPRAY, NON-AEROSOL (ML) MUCOUS MEMBRANE
Status: DISCONTINUED | OUTPATIENT
Start: 2022-01-01 | End: 2022-01-01 | Stop reason: HOSPADM

## 2022-01-01 RX ORDER — NALOXONE HYDROCHLORIDE 0.4 MG/ML
0.4 INJECTION, SOLUTION INTRAMUSCULAR; INTRAVENOUS; SUBCUTANEOUS
Status: DISCONTINUED | OUTPATIENT
Start: 2022-01-01 | End: 2022-01-01 | Stop reason: ALTCHOICE

## 2022-01-01 RX ORDER — GLYCOPYRROLATE 0.2 MG/ML
INJECTION, SOLUTION INTRAMUSCULAR; INTRAVENOUS PRN
Status: DISCONTINUED | OUTPATIENT
Start: 2022-01-01 | End: 2022-01-01

## 2022-01-01 RX ORDER — ONDANSETRON 2 MG/ML
4 INJECTION INTRAMUSCULAR; INTRAVENOUS EVERY 6 HOURS PRN
Status: DISCONTINUED | OUTPATIENT
Start: 2022-01-01 | End: 2022-01-01

## 2022-01-01 RX ORDER — PROCHLORPERAZINE MALEATE 5 MG
5 TABLET ORAL EVERY 6 HOURS PRN
Status: DISCONTINUED | OUTPATIENT
Start: 2022-01-01 | End: 2022-01-01 | Stop reason: HOSPADM

## 2022-01-01 RX ORDER — FUROSEMIDE 20 MG
20 TABLET ORAL DAILY
Status: ON HOLD | COMMUNITY
End: 2022-01-01

## 2022-01-01 RX ORDER — ACETAMINOPHEN 325 MG/1
975 TABLET ORAL EVERY 8 HOURS
Status: DISPENSED | OUTPATIENT
Start: 2022-01-01 | End: 2022-01-01

## 2022-01-01 RX ORDER — ACETAMINOPHEN 325 MG/1
650 TABLET ORAL EVERY 4 HOURS PRN
Qty: 30 TABLET | Refills: 0 | DISCHARGE
Start: 2022-01-01

## 2022-01-01 RX ORDER — PROPOFOL 10 MG/ML
INJECTION, EMULSION INTRAVENOUS PRN
Status: DISCONTINUED | OUTPATIENT
Start: 2022-01-01 | End: 2022-01-01

## 2022-01-01 RX ORDER — METOPROLOL SUCCINATE 25 MG/1
25 TABLET, EXTENDED RELEASE ORAL DAILY
Status: DISCONTINUED | OUTPATIENT
Start: 2022-01-01 | End: 2022-01-01

## 2022-01-01 RX ORDER — NALOXONE HYDROCHLORIDE 0.4 MG/ML
0.2 INJECTION, SOLUTION INTRAMUSCULAR; INTRAVENOUS; SUBCUTANEOUS
Status: DISCONTINUED | OUTPATIENT
Start: 2022-01-01 | End: 2022-01-01 | Stop reason: ALTCHOICE

## 2022-01-01 RX ORDER — BISACODYL 10 MG
10 SUPPOSITORY, RECTAL RECTAL DAILY PRN
Qty: 2 SUPPOSITORY | Refills: 0 | Status: SHIPPED | OUTPATIENT
Start: 2022-01-01

## 2022-01-01 RX ORDER — ONDANSETRON 4 MG/1
4 TABLET, ORALLY DISINTEGRATING ORAL EVERY 6 HOURS PRN
Status: DISCONTINUED | OUTPATIENT
Start: 2022-01-01 | End: 2022-01-01 | Stop reason: HOSPADM

## 2022-01-01 RX ORDER — LIDOCAINE 40 MG/G
CREAM TOPICAL
Status: DISCONTINUED | OUTPATIENT
Start: 2022-01-01 | End: 2022-01-01

## 2022-01-01 RX ORDER — LIDOCAINE 40 MG/G
CREAM TOPICAL
Status: DISCONTINUED | OUTPATIENT
Start: 2022-01-01 | End: 2022-01-01 | Stop reason: HOSPADM

## 2022-01-01 RX ORDER — TRANEXAMIC ACID 10 MG/ML
1 INJECTION, SOLUTION INTRAVENOUS ONCE
Status: DISCONTINUED | OUTPATIENT
Start: 2022-01-01 | End: 2022-01-01 | Stop reason: HOSPADM

## 2022-01-01 RX ORDER — ACETAMINOPHEN 650 MG/1
650 SUPPOSITORY RECTAL EVERY 4 HOURS PRN
Qty: 6 SUPPOSITORY | Refills: 0 | Status: SHIPPED | OUTPATIENT
Start: 2022-01-01

## 2022-01-01 RX ORDER — NALOXONE HYDROCHLORIDE 0.4 MG/ML
0.2 INJECTION, SOLUTION INTRAMUSCULAR; INTRAVENOUS; SUBCUTANEOUS
Status: DISCONTINUED | OUTPATIENT
Start: 2022-01-01 | End: 2022-01-01 | Stop reason: HOSPADM

## 2022-01-01 RX ORDER — HYDROMORPHONE HCL IN WATER/PF 6 MG/30 ML
0.2 PATIENT CONTROLLED ANALGESIA SYRINGE INTRAVENOUS
Status: COMPLETED | OUTPATIENT
Start: 2022-01-01 | End: 2022-01-01

## 2022-01-01 RX ORDER — DOCUSATE SODIUM 100 MG/1
100 CAPSULE, LIQUID FILLED ORAL 2 TIMES DAILY
Status: DISCONTINUED | OUTPATIENT
Start: 2022-01-01 | End: 2022-01-01

## 2022-01-01 RX ORDER — POLYETHYLENE GLYCOL 3350 17 G/17G
17 POWDER, FOR SOLUTION ORAL DAILY
Status: DISCONTINUED | OUTPATIENT
Start: 2022-01-01 | End: 2022-01-01

## 2022-01-01 RX ORDER — LORAZEPAM 2 MG/ML
1 CONCENTRATE ORAL EVERY 4 HOURS PRN
Status: DISCONTINUED | OUTPATIENT
Start: 2022-01-01 | End: 2022-01-01 | Stop reason: HOSPADM

## 2022-01-01 RX ORDER — DIMENHYDRINATE 50 MG/ML
25 INJECTION, SOLUTION INTRAMUSCULAR; INTRAVENOUS
Status: DISCONTINUED | OUTPATIENT
Start: 2022-01-01 | End: 2022-01-01 | Stop reason: HOSPADM

## 2022-01-01 RX ORDER — DIPHENHYDRAMINE HCL 12.5MG/5ML
12.5 LIQUID (ML) ORAL EVERY 6 HOURS PRN
Status: DISCONTINUED | OUTPATIENT
Start: 2022-01-01 | End: 2022-01-01 | Stop reason: HOSPADM

## 2022-01-01 RX ORDER — HYDROMORPHONE HCL IN WATER/PF 6 MG/30 ML
0.2 PATIENT CONTROLLED ANALGESIA SYRINGE INTRAVENOUS EVERY 5 MIN PRN
Status: DISCONTINUED | OUTPATIENT
Start: 2022-01-01 | End: 2022-01-01 | Stop reason: HOSPADM

## 2022-01-01 RX ORDER — METOPROLOL SUCCINATE 100 MG/1
100 TABLET, EXTENDED RELEASE ORAL 2 TIMES DAILY
Status: ON HOLD | COMMUNITY
End: 2022-01-01

## 2022-01-01 RX ORDER — NEOSTIGMINE METHYLSULFATE 1 MG/ML
VIAL (ML) INJECTION PRN
Status: DISCONTINUED | OUTPATIENT
Start: 2022-01-01 | End: 2022-01-01

## 2022-01-01 RX ORDER — EPHEDRINE SULFATE 50 MG/ML
INJECTION, SOLUTION INTRAVENOUS PRN
Status: DISCONTINUED | OUTPATIENT
Start: 2022-01-01 | End: 2022-01-01

## 2022-01-01 RX ORDER — SODIUM CHLORIDE 9 MG/ML
INJECTION, SOLUTION INTRAVENOUS CONTINUOUS
Status: DISCONTINUED | OUTPATIENT
Start: 2022-01-01 | End: 2022-01-01

## 2022-01-01 RX ORDER — CEFAZOLIN SODIUM/WATER 2 G/20 ML
2 SYRINGE (ML) INTRAVENOUS SEE ADMIN INSTRUCTIONS
Status: DISCONTINUED | OUTPATIENT
Start: 2022-01-01 | End: 2022-01-01

## 2022-01-01 RX ORDER — CYCLOSPORINE 25 MG/1
25 CAPSULE ORAL 2 TIMES DAILY
Status: DISCONTINUED | OUTPATIENT
Start: 2022-01-01 | End: 2022-01-01

## 2022-01-01 RX ORDER — ONDANSETRON 2 MG/ML
4 INJECTION INTRAMUSCULAR; INTRAVENOUS EVERY 30 MIN PRN
Status: DISCONTINUED | OUTPATIENT
Start: 2022-01-01 | End: 2022-01-01 | Stop reason: HOSPADM

## 2022-01-01 RX ORDER — FENTANYL CITRATE 50 UG/ML
25 INJECTION, SOLUTION INTRAMUSCULAR; INTRAVENOUS EVERY 5 MIN PRN
Status: DISCONTINUED | OUTPATIENT
Start: 2022-01-01 | End: 2022-01-01 | Stop reason: HOSPADM

## 2022-01-01 RX ORDER — LABETALOL HYDROCHLORIDE 5 MG/ML
10 INJECTION, SOLUTION INTRAVENOUS
Status: DISCONTINUED | OUTPATIENT
Start: 2022-01-01 | End: 2022-01-01 | Stop reason: HOSPADM

## 2022-01-01 RX ORDER — SODIUM CHLORIDE 9 MG/ML
INJECTION, SOLUTION INTRAVENOUS CONTINUOUS PRN
Status: DISCONTINUED | OUTPATIENT
Start: 2022-01-01 | End: 2022-01-01

## 2022-01-01 RX ORDER — BISACODYL 10 MG
10 SUPPOSITORY, RECTAL RECTAL DAILY PRN
Status: DISCONTINUED | OUTPATIENT
Start: 2022-01-01 | End: 2022-01-01 | Stop reason: HOSPADM

## 2022-01-01 RX ORDER — DIPHENHYDRAMINE HYDROCHLORIDE 50 MG/ML
12.5 INJECTION INTRAMUSCULAR; INTRAVENOUS EVERY 6 HOURS PRN
Status: DISCONTINUED | OUTPATIENT
Start: 2022-01-01 | End: 2022-01-01 | Stop reason: HOSPADM

## 2022-01-01 RX ORDER — METOPROLOL SUCCINATE 100 MG/1
100 TABLET, EXTENDED RELEASE ORAL 2 TIMES DAILY
Status: DISCONTINUED | OUTPATIENT
Start: 2022-01-01 | End: 2022-01-01

## 2022-01-01 RX ORDER — ACETAMINOPHEN 325 MG/1
650 TABLET ORAL EVERY 4 HOURS PRN
Status: DISCONTINUED | OUTPATIENT
Start: 2022-01-01 | End: 2022-01-01 | Stop reason: HOSPADM

## 2022-01-01 RX ORDER — HYDROMORPHONE HCL IN WATER/PF 6 MG/30 ML
0.2 PATIENT CONTROLLED ANALGESIA SYRINGE INTRAVENOUS
Status: DISCONTINUED | OUTPATIENT
Start: 2022-01-01 | End: 2022-01-01

## 2022-01-01 RX ORDER — SODIUM CHLORIDE 9 MG/ML
INJECTION, SOLUTION INTRAVENOUS CONTINUOUS
Status: DISCONTINUED | OUTPATIENT
Start: 2022-01-01 | End: 2022-01-01 | Stop reason: CLARIF

## 2022-01-01 RX ORDER — HYDROMORPHONE HYDROCHLORIDE 1 MG/ML
.3-.5 INJECTION, SOLUTION INTRAMUSCULAR; INTRAVENOUS; SUBCUTANEOUS
Status: DISCONTINUED | OUTPATIENT
Start: 2022-01-01 | End: 2022-01-01

## 2022-01-01 RX ORDER — CEFTRIAXONE 1 G/1
1 INJECTION, POWDER, FOR SOLUTION INTRAMUSCULAR; INTRAVENOUS EVERY 24 HOURS
Status: DISCONTINUED | OUTPATIENT
Start: 2022-01-01 | End: 2022-01-01

## 2022-01-01 RX ORDER — HYDROMORPHONE HYDROCHLORIDE 1 MG/ML
1 SOLUTION ORAL
Status: DISCONTINUED | OUTPATIENT
Start: 2022-01-01 | End: 2022-01-01 | Stop reason: HOSPADM

## 2022-01-01 RX ORDER — AMOXICILLIN 250 MG
1 CAPSULE ORAL 2 TIMES DAILY
Status: DISCONTINUED | OUTPATIENT
Start: 2022-01-01 | End: 2022-01-01

## 2022-01-01 RX ORDER — AMLODIPINE BESYLATE 5 MG/1
5 TABLET ORAL DAILY
Status: DISCONTINUED | OUTPATIENT
Start: 2022-01-01 | End: 2022-01-01

## 2022-01-01 RX ORDER — AMOXICILLIN 250 MG
1 CAPSULE ORAL 2 TIMES DAILY PRN
Qty: 15 TABLET | DISCHARGE
Start: 2022-01-01

## 2022-01-01 RX ORDER — DIAZEPAM 10 MG/2ML
2.5 INJECTION, SOLUTION INTRAMUSCULAR; INTRAVENOUS
Status: DISCONTINUED | OUTPATIENT
Start: 2022-01-01 | End: 2022-01-01 | Stop reason: HOSPADM

## 2022-01-01 RX ORDER — OXYCODONE HYDROCHLORIDE 5 MG/1
5 TABLET ORAL EVERY 4 HOURS PRN
Status: DISCONTINUED | OUTPATIENT
Start: 2022-01-01 | End: 2022-01-01 | Stop reason: HOSPADM

## 2022-01-01 RX ORDER — SODIUM CHLORIDE, SODIUM LACTATE, POTASSIUM CHLORIDE, CALCIUM CHLORIDE 600; 310; 30; 20 MG/100ML; MG/100ML; MG/100ML; MG/100ML
INJECTION, SOLUTION INTRAVENOUS CONTINUOUS
Status: DISCONTINUED | OUTPATIENT
Start: 2022-01-01 | End: 2022-01-01 | Stop reason: HOSPADM

## 2022-01-01 RX ORDER — CEFAZOLIN SODIUM 1 G/3ML
1 INJECTION, POWDER, FOR SOLUTION INTRAMUSCULAR; INTRAVENOUS EVERY 8 HOURS
Status: COMPLETED | OUTPATIENT
Start: 2022-01-01 | End: 2022-01-01

## 2022-01-01 RX ORDER — LIDOCAINE HYDROCHLORIDE 10 MG/ML
INJECTION, SOLUTION INFILTRATION; PERINEURAL PRN
Status: DISCONTINUED | OUTPATIENT
Start: 2022-01-01 | End: 2022-01-01

## 2022-01-01 RX ORDER — CEFAZOLIN SODIUM/WATER 2 G/20 ML
2 SYRINGE (ML) INTRAVENOUS
Status: COMPLETED | OUTPATIENT
Start: 2022-01-01 | End: 2022-01-01

## 2022-01-01 RX ORDER — HYDROMORPHONE HCL IN WATER/PF 6 MG/30 ML
0.2 PATIENT CONTROLLED ANALGESIA SYRINGE INTRAVENOUS
Status: DISCONTINUED | OUTPATIENT
Start: 2022-01-01 | End: 2022-01-01 | Stop reason: HOSPADM

## 2022-01-01 RX ORDER — ACETAMINOPHEN 325 MG/1
975 TABLET ORAL ONCE
Status: DISCONTINUED | OUTPATIENT
Start: 2022-01-01 | End: 2022-01-01 | Stop reason: HOSPADM

## 2022-01-01 RX ORDER — DEXAMETHASONE SODIUM PHOSPHATE 4 MG/ML
INJECTION, SOLUTION INTRA-ARTICULAR; INTRALESIONAL; INTRAMUSCULAR; INTRAVENOUS; SOFT TISSUE PRN
Status: DISCONTINUED | OUTPATIENT
Start: 2022-01-01 | End: 2022-01-01

## 2022-01-01 RX ORDER — LORAZEPAM 0.5 MG/1
0.5 TABLET ORAL EVERY 6 HOURS PRN
Qty: 12 TABLET | Refills: 0 | Status: SHIPPED | OUTPATIENT
Start: 2022-01-01

## 2022-01-01 RX ADMIN — CYCLOSPORINE 25 MG: 25 CAPSULE ORAL at 22:13

## 2022-01-01 RX ADMIN — AZATHIOPRINE 50 MG: 50 TABLET ORAL at 20:17

## 2022-01-01 RX ADMIN — AMLODIPINE BESYLATE 5 MG: 5 TABLET ORAL at 10:21

## 2022-01-01 RX ADMIN — AZATHIOPRINE 50 MG: 50 TABLET ORAL at 20:33

## 2022-01-01 RX ADMIN — SODIUM CHLORIDE, PRESERVATIVE FREE: 5 INJECTION INTRAVENOUS at 22:44

## 2022-01-01 RX ADMIN — SODIUM CHLORIDE, POTASSIUM CHLORIDE, SODIUM LACTATE AND CALCIUM CHLORIDE: 600; 310; 30; 20 INJECTION, SOLUTION INTRAVENOUS at 22:16

## 2022-01-01 RX ADMIN — CYCLOSPORINE 25 MG: 25 CAPSULE ORAL at 09:51

## 2022-01-01 RX ADMIN — CYCLOSPORINE 25 MG: 25 CAPSULE ORAL at 10:08

## 2022-01-01 RX ADMIN — ACETAMINOPHEN 650 MG: 325 TABLET, FILM COATED ORAL at 23:35

## 2022-01-01 RX ADMIN — APIXABAN 2.5 MG: 2.5 TABLET, FILM COATED ORAL at 19:49

## 2022-01-01 RX ADMIN — SODIUM CHLORIDE, PRESERVATIVE FREE: 5 INJECTION INTRAVENOUS at 11:55

## 2022-01-01 RX ADMIN — SODIUM BICARBONATE 650 MG TABLET 650 MG: at 10:10

## 2022-01-01 RX ADMIN — SODIUM BICARBONATE 650 MG TABLET 650 MG: at 09:43

## 2022-01-01 RX ADMIN — CYCLOSPORINE 25 MG: 25 CAPSULE ORAL at 21:25

## 2022-01-01 RX ADMIN — SODIUM CHLORIDE, PRESERVATIVE FREE: 5 INJECTION INTRAVENOUS at 12:06

## 2022-01-01 RX ADMIN — ACETAMINOPHEN 975 MG: 325 TABLET, FILM COATED ORAL at 03:59

## 2022-01-01 RX ADMIN — ACETAMINOPHEN 650 MG: 325 TABLET, FILM COATED ORAL at 08:39

## 2022-01-01 RX ADMIN — OXYCODONE HYDROCHLORIDE 5 MG: 5 TABLET ORAL at 16:56

## 2022-01-01 RX ADMIN — METOPROLOL SUCCINATE 25 MG: 25 TABLET, EXTENDED RELEASE ORAL at 09:26

## 2022-01-01 RX ADMIN — PHENYLEPHRINE HYDROCHLORIDE 150 MCG: 10 INJECTION INTRAVENOUS at 07:41

## 2022-01-01 RX ADMIN — Medication 50 MCG: at 09:46

## 2022-01-01 RX ADMIN — DOCUSATE SODIUM 100 MG: 100 CAPSULE, LIQUID FILLED ORAL at 21:29

## 2022-01-01 RX ADMIN — METOPROLOL SUCCINATE 25 MG: 25 TABLET, EXTENDED RELEASE ORAL at 08:24

## 2022-01-01 RX ADMIN — PROPOFOL 100 MG: 10 INJECTION, EMULSION INTRAVENOUS at 07:38

## 2022-01-01 RX ADMIN — CEFTRIAXONE 1 G: 1 INJECTION, POWDER, FOR SOLUTION INTRAMUSCULAR; INTRAVENOUS at 06:12

## 2022-01-01 RX ADMIN — CYCLOSPORINE 25 MG: 25 CAPSULE ORAL at 10:18

## 2022-01-01 RX ADMIN — OXYCODONE HYDROCHLORIDE 5 MG: 5 TABLET ORAL at 00:22

## 2022-01-01 RX ADMIN — CEFAZOLIN 1 G: 1 INJECTION, POWDER, FOR SOLUTION INTRAMUSCULAR; INTRAVENOUS at 23:29

## 2022-01-01 RX ADMIN — ACETAMINOPHEN 975 MG: 325 TABLET, FILM COATED ORAL at 04:20

## 2022-01-01 RX ADMIN — DOCUSATE SODIUM 100 MG: 100 CAPSULE, LIQUID FILLED ORAL at 10:21

## 2022-01-01 RX ADMIN — ONDANSETRON 4 MG: 4 TABLET, ORALLY DISINTEGRATING ORAL at 08:49

## 2022-01-01 RX ADMIN — SENNOSIDES AND DOCUSATE SODIUM 1 TABLET: 50; 8.6 TABLET ORAL at 10:17

## 2022-01-01 RX ADMIN — TRANEXAMIC ACID 1 G: 10 INJECTION, SOLUTION INTRAVENOUS at 08:20

## 2022-01-01 RX ADMIN — Medication 50 MCG: at 09:53

## 2022-01-01 RX ADMIN — SODIUM CHLORIDE: 9 INJECTION, SOLUTION INTRAVENOUS at 02:23

## 2022-01-01 RX ADMIN — DOCUSATE SODIUM 100 MG: 100 CAPSULE, LIQUID FILLED ORAL at 10:00

## 2022-01-01 RX ADMIN — ONDANSETRON HYDROCHLORIDE 4 MG: 2 INJECTION, SOLUTION INTRAVENOUS at 07:38

## 2022-01-01 RX ADMIN — APIXABAN 2.5 MG: 2.5 TABLET, FILM COATED ORAL at 09:05

## 2022-01-01 RX ADMIN — DOCUSATE SODIUM 100 MG: 100 CAPSULE, LIQUID FILLED ORAL at 20:17

## 2022-01-01 RX ADMIN — ACETAMINOPHEN 975 MG: 325 TABLET, FILM COATED ORAL at 20:16

## 2022-01-01 RX ADMIN — ONDANSETRON 4 MG: 2 INJECTION INTRAMUSCULAR; INTRAVENOUS at 08:00

## 2022-01-01 RX ADMIN — DOCUSATE SODIUM 100 MG: 100 CAPSULE, LIQUID FILLED ORAL at 19:36

## 2022-01-01 RX ADMIN — AMLODIPINE BESYLATE 5 MG: 5 TABLET ORAL at 09:26

## 2022-01-01 RX ADMIN — SENNOSIDES AND DOCUSATE SODIUM 1 TABLET: 50; 8.6 TABLET ORAL at 21:28

## 2022-01-01 RX ADMIN — HYDROMORPHONE HYDROCHLORIDE 0.2 MG: 0.2 INJECTION, SOLUTION INTRAMUSCULAR; INTRAVENOUS; SUBCUTANEOUS at 03:28

## 2022-01-01 RX ADMIN — AZATHIOPRINE 50 MG: 50 TABLET ORAL at 20:53

## 2022-01-01 RX ADMIN — PHENYLEPHRINE HYDROCHLORIDE 100 MCG: 10 INJECTION INTRAVENOUS at 07:44

## 2022-01-01 RX ADMIN — ONDANSETRON 4 MG: 2 INJECTION INTRAMUSCULAR; INTRAVENOUS at 03:29

## 2022-01-01 RX ADMIN — DOCUSATE SODIUM 100 MG: 100 CAPSULE, LIQUID FILLED ORAL at 19:49

## 2022-01-01 RX ADMIN — PHENYLEPHRINE HYDROCHLORIDE 150 MCG: 10 INJECTION INTRAVENOUS at 07:54

## 2022-01-01 RX ADMIN — NEOSTIGMINE METHYLSULFATE 1 MG: 1 INJECTION, SOLUTION INTRAVENOUS at 08:27

## 2022-01-01 RX ADMIN — APIXABAN 2.5 MG: 2.5 TABLET, FILM COATED ORAL at 20:33

## 2022-01-01 RX ADMIN — OXYCODONE HYDROCHLORIDE 5 MG: 5 TABLET ORAL at 04:11

## 2022-01-01 RX ADMIN — LIDOCAINE HYDROCHLORIDE 30 MG: 10 INJECTION, SOLUTION INFILTRATION; PERINEURAL at 07:38

## 2022-01-01 RX ADMIN — ACETAMINOPHEN 650 MG: 325 TABLET, FILM COATED ORAL at 16:07

## 2022-01-01 RX ADMIN — CYCLOSPORINE 25 MG: 25 CAPSULE ORAL at 10:21

## 2022-01-01 RX ADMIN — METOPROLOL SUCCINATE 25 MG: 25 TABLET, EXTENDED RELEASE ORAL at 15:17

## 2022-01-01 RX ADMIN — AZATHIOPRINE 50 MG: 50 TABLET ORAL at 19:50

## 2022-01-01 RX ADMIN — ACETAMINOPHEN 975 MG: 325 TABLET, FILM COATED ORAL at 12:08

## 2022-01-01 RX ADMIN — SODIUM CHLORIDE, POTASSIUM CHLORIDE, SODIUM LACTATE AND CALCIUM CHLORIDE: 600; 310; 30; 20 INJECTION, SOLUTION INTRAVENOUS at 09:18

## 2022-01-01 RX ADMIN — Medication 50 MCG: at 17:04

## 2022-01-01 RX ADMIN — Medication 50 MCG: at 10:21

## 2022-01-01 RX ADMIN — CEFAZOLIN 1 G: 1 INJECTION, POWDER, FOR SOLUTION INTRAMUSCULAR; INTRAVENOUS at 15:18

## 2022-01-01 RX ADMIN — GLYCOPYRROLATE 0.2 MG: 0.2 INJECTION, SOLUTION INTRAMUSCULAR; INTRAVENOUS at 07:38

## 2022-01-01 RX ADMIN — SODIUM CHLORIDE: 9 INJECTION, SOLUTION INTRAVENOUS at 16:44

## 2022-01-01 RX ADMIN — SODIUM CHLORIDE: 9 INJECTION, SOLUTION INTRAVENOUS at 07:35

## 2022-01-01 RX ADMIN — APIXABAN 2.5 MG: 2.5 TABLET, FILM COATED ORAL at 12:09

## 2022-01-01 RX ADMIN — CYCLOSPORINE 25 MG: 25 CAPSULE ORAL at 22:02

## 2022-01-01 RX ADMIN — PHENYLEPHRINE HYDROCHLORIDE 100 MCG: 10 INJECTION INTRAVENOUS at 08:20

## 2022-01-01 RX ADMIN — Medication 50 MCG: at 10:17

## 2022-01-01 RX ADMIN — HYDROMORPHONE HYDROCHLORIDE 0.2 MG: 0.2 INJECTION, SOLUTION INTRAMUSCULAR; INTRAVENOUS; SUBCUTANEOUS at 14:46

## 2022-01-01 RX ADMIN — HYDROMORPHONE HYDROCHLORIDE 0.2 MG: 0.2 INJECTION, SOLUTION INTRAMUSCULAR; INTRAVENOUS; SUBCUTANEOUS at 17:05

## 2022-01-01 RX ADMIN — ONDANSETRON 4 MG: 4 TABLET, ORALLY DISINTEGRATING ORAL at 14:17

## 2022-01-01 RX ADMIN — TRAMADOL HYDROCHLORIDE 25 MG: 50 TABLET, FILM COATED ORAL at 14:52

## 2022-01-01 RX ADMIN — EPHEDRINE SULFATE 5 MG: 50 INJECTION, SOLUTION INTRAVENOUS at 07:49

## 2022-01-01 RX ADMIN — SODIUM BICARBONATE 650 MG TABLET 650 MG: at 20:41

## 2022-01-01 RX ADMIN — SODIUM BICARBONATE 650 MG TABLET 650 MG: at 14:08

## 2022-01-01 RX ADMIN — CYCLOSPORINE 25 MG: 25 CAPSULE ORAL at 21:48

## 2022-01-01 RX ADMIN — PROCHLORPERAZINE EDISYLATE 5 MG: 5 INJECTION INTRAMUSCULAR; INTRAVENOUS at 09:15

## 2022-01-01 RX ADMIN — CEFTRIAXONE 1 G: 1 INJECTION, POWDER, FOR SOLUTION INTRAMUSCULAR; INTRAVENOUS at 06:37

## 2022-01-01 RX ADMIN — AZATHIOPRINE 50 MG: 50 TABLET ORAL at 21:29

## 2022-01-01 RX ADMIN — FENTANYL CITRATE 50 MCG: 50 INJECTION, SOLUTION INTRAMUSCULAR; INTRAVENOUS at 08:10

## 2022-01-01 RX ADMIN — ONDANSETRON 4 MG: 2 INJECTION INTRAMUSCULAR; INTRAVENOUS at 00:22

## 2022-01-01 RX ADMIN — SODIUM BICARBONATE 650 MG TABLET 650 MG: at 14:03

## 2022-01-01 RX ADMIN — SENNOSIDES AND DOCUSATE SODIUM 1 TABLET: 50; 8.6 TABLET ORAL at 21:23

## 2022-01-01 RX ADMIN — ACETAMINOPHEN 650 MG: 325 TABLET, FILM COATED ORAL at 00:40

## 2022-01-01 RX ADMIN — DOCUSATE SODIUM 100 MG: 100 CAPSULE, LIQUID FILLED ORAL at 08:41

## 2022-01-01 RX ADMIN — SODIUM CHLORIDE: 9 INJECTION, SOLUTION INTRAVENOUS at 04:39

## 2022-01-01 RX ADMIN — EPHEDRINE SULFATE 5 MG: 50 INJECTION, SOLUTION INTRAVENOUS at 07:58

## 2022-01-01 RX ADMIN — ACETAMINOPHEN 650 MG: 325 TABLET, FILM COATED ORAL at 17:06

## 2022-01-01 RX ADMIN — APIXABAN 2.5 MG: 2.5 TABLET, FILM COATED ORAL at 09:26

## 2022-01-01 RX ADMIN — ACETAMINOPHEN 975 MG: 325 TABLET, FILM COATED ORAL at 19:36

## 2022-01-01 RX ADMIN — DEXAMETHASONE SODIUM PHOSPHATE 8 MG: 4 INJECTION, SOLUTION INTRA-ARTICULAR; INTRALESIONAL; INTRAMUSCULAR; INTRAVENOUS; SOFT TISSUE at 07:38

## 2022-01-01 RX ADMIN — AMLODIPINE BESYLATE 5 MG: 5 TABLET ORAL at 08:41

## 2022-01-01 RX ADMIN — ACETAMINOPHEN 650 MG: 325 TABLET, FILM COATED ORAL at 12:48

## 2022-01-01 RX ADMIN — TRANEXAMIC ACID 1 G: 10 INJECTION, SOLUTION INTRAVENOUS at 07:35

## 2022-01-01 RX ADMIN — METOPROLOL SUCCINATE 25 MG: 25 TABLET, EXTENDED RELEASE ORAL at 08:41

## 2022-01-01 RX ADMIN — Medication 2 G: at 07:35

## 2022-01-01 RX ADMIN — AMLODIPINE BESYLATE 5 MG: 5 TABLET ORAL at 08:27

## 2022-01-01 RX ADMIN — SODIUM CHLORIDE, PRESERVATIVE FREE: 5 INJECTION INTRAVENOUS at 06:11

## 2022-01-01 RX ADMIN — OXYCODONE HYDROCHLORIDE 5 MG: 5 TABLET ORAL at 19:36

## 2022-01-01 RX ADMIN — SODIUM CHLORIDE: 9 INJECTION, SOLUTION INTRAVENOUS at 17:04

## 2022-01-01 RX ADMIN — OXYCODONE HYDROCHLORIDE 5 MG: 5 TABLET ORAL at 02:21

## 2022-01-01 RX ADMIN — ACETAMINOPHEN 650 MG: 325 TABLET, FILM COATED ORAL at 07:53

## 2022-01-01 RX ADMIN — DOCUSATE SODIUM 100 MG: 100 CAPSULE, LIQUID FILLED ORAL at 09:42

## 2022-01-01 RX ADMIN — PHENYLEPHRINE HYDROCHLORIDE 100 MCG: 10 INJECTION INTRAVENOUS at 07:47

## 2022-01-01 RX ADMIN — CYCLOSPORINE 25 MG: 25 CAPSULE ORAL at 22:18

## 2022-01-01 RX ADMIN — ACETAMINOPHEN 650 MG: 325 TABLET, FILM COATED ORAL at 16:40

## 2022-01-01 RX ADMIN — SENNOSIDES AND DOCUSATE SODIUM 1 TABLET: 50; 8.6 TABLET ORAL at 09:46

## 2022-01-01 RX ADMIN — ACETAMINOPHEN 650 MG: 325 TABLET, FILM COATED ORAL at 09:36

## 2022-01-01 RX ADMIN — PHENYLEPHRINE HYDROCHLORIDE 100 MCG: 10 INJECTION INTRAVENOUS at 08:04

## 2022-01-01 RX ADMIN — GLYCOPYRROLATE 0.2 MG: 0.2 INJECTION, SOLUTION INTRAMUSCULAR; INTRAVENOUS at 08:26

## 2022-01-01 RX ADMIN — SODIUM BICARBONATE 650 MG TABLET 650 MG: at 19:50

## 2022-01-01 RX ADMIN — CEFTRIAXONE 1 G: 1 INJECTION, POWDER, FOR SOLUTION INTRAMUSCULAR; INTRAVENOUS at 06:31

## 2022-01-01 RX ADMIN — SODIUM CHLORIDE, POTASSIUM CHLORIDE, SODIUM LACTATE AND CALCIUM CHLORIDE: 600; 310; 30; 20 INJECTION, SOLUTION INTRAVENOUS at 09:59

## 2022-01-01 RX ADMIN — ONDANSETRON 4 MG: 2 INJECTION INTRAMUSCULAR; INTRAVENOUS at 18:07

## 2022-01-01 RX ADMIN — ONDANSETRON 4 MG: 2 INJECTION INTRAMUSCULAR; INTRAVENOUS at 09:05

## 2022-01-01 RX ADMIN — FENTANYL CITRATE 100 MCG: 50 INJECTION, SOLUTION INTRAMUSCULAR; INTRAVENOUS at 07:38

## 2022-01-01 ASSESSMENT — ACTIVITIES OF DAILY LIVING (ADL)
ADLS_ACUITY_SCORE: 26
ADLS_ACUITY_SCORE: 14
ADLS_ACUITY_SCORE: 12
ADLS_ACUITY_SCORE: 26
ADLS_ACUITY_SCORE: 14
ADLS_ACUITY_SCORE: 16
ADLS_ACUITY_SCORE: 19
ADLS_ACUITY_SCORE: 14
ADLS_ACUITY_SCORE: 14
ADLS_ACUITY_SCORE: 15
ADLS_ACUITY_SCORE: 24
ADLS_ACUITY_SCORE: 14
ADLS_ACUITY_SCORE: 12
ADLS_ACUITY_SCORE: 12
ADLS_ACUITY_SCORE: 14
ADLS_ACUITY_SCORE: 12
ADLS_ACUITY_SCORE: 14
ADLS_ACUITY_SCORE: 10
ADLS_ACUITY_SCORE: 19
EQUIPMENT_CURRENTLY_USED_AT_HOME: WALKER, STANDARD
ADLS_ACUITY_SCORE: 14
ADLS_ACUITY_SCORE: 24
ADLS_ACUITY_SCORE: 12
ADLS_ACUITY_SCORE: 12
ADLS_ACUITY_SCORE: 19
ADLS_ACUITY_SCORE: 14
ADLS_ACUITY_SCORE: 12
ADLS_ACUITY_SCORE: 14
DRESSING/BATHING_DIFFICULTY: NO
ADLS_ACUITY_SCORE: 12
ADLS_ACUITY_SCORE: 14
ADLS_ACUITY_SCORE: 15
ADLS_ACUITY_SCORE: 11
ADLS_ACUITY_SCORE: 14
ADLS_ACUITY_SCORE: 24
ADLS_ACUITY_SCORE: 14
ADLS_ACUITY_SCORE: 14
ADLS_ACUITY_SCORE: 26
ADLS_ACUITY_SCORE: 26
ADLS_ACUITY_SCORE: 12
ADLS_ACUITY_SCORE: 20
ADLS_ACUITY_SCORE: 16
ADLS_ACUITY_SCORE: 14
ADLS_ACUITY_SCORE: 20
ADLS_ACUITY_SCORE: 14
ADLS_ACUITY_SCORE: 14
TRANSFERRING: 0-->ASSISTANCE NEEDED (DEVELOPMETNALLY APPROPRIATE)
ADLS_ACUITY_SCORE: 14
ADLS_ACUITY_SCORE: 14
ADLS_ACUITY_SCORE: 12
ADLS_ACUITY_SCORE: 14
DEPENDENT_IADLS:: CLEANING;COOKING;LAUNDRY;MEAL PREPARATION;MEDICATION MANAGEMENT
ADLS_ACUITY_SCORE: 12
ADLS_ACUITY_SCORE: 26
ADLS_ACUITY_SCORE: 14
ADLS_ACUITY_SCORE: 14
ADLS_ACUITY_SCORE: 12
ADLS_ACUITY_SCORE: 20
ADLS_ACUITY_SCORE: 14
ADLS_ACUITY_SCORE: 12
ADLS_ACUITY_SCORE: 14
DIFFICULTY_EATING/SWALLOWING: NO
ADLS_ACUITY_SCORE: 24
ADLS_ACUITY_SCORE: 14
ADLS_ACUITY_SCORE: 12
ADLS_ACUITY_SCORE: 14
ADLS_ACUITY_SCORE: 15
ADLS_ACUITY_SCORE: 22
ADLS_ACUITY_SCORE: 16
ADLS_ACUITY_SCORE: 24
ADLS_ACUITY_SCORE: 14
ADLS_ACUITY_SCORE: 24
ADLS_ACUITY_SCORE: 14
ADLS_ACUITY_SCORE: 14
ADLS_ACUITY_SCORE: 12
ADLS_ACUITY_SCORE: 10
ADLS_ACUITY_SCORE: 14
ADLS_ACUITY_SCORE: 16
ADLS_ACUITY_SCORE: 14
ADLS_ACUITY_SCORE: 12
ADLS_ACUITY_SCORE: 14
ADLS_ACUITY_SCORE: 16
ADLS_ACUITY_SCORE: 10
ADLS_ACUITY_SCORE: 14
ADLS_ACUITY_SCORE: 14
ADLS_ACUITY_SCORE: 24
ADLS_ACUITY_SCORE: 14
ADLS_ACUITY_SCORE: 16
ADLS_ACUITY_SCORE: 14
FALL_HISTORY_WITHIN_LAST_SIX_MONTHS: YES
ADLS_ACUITY_SCORE: 19
ADLS_ACUITY_SCORE: 24
ADLS_ACUITY_SCORE: 14
ADLS_ACUITY_SCORE: 24
ADLS_ACUITY_SCORE: 14
ADLS_ACUITY_SCORE: 12
ADLS_ACUITY_SCORE: 22
ADLS_ACUITY_SCORE: 12
ADLS_ACUITY_SCORE: 14
ADLS_ACUITY_SCORE: 16
ADLS_ACUITY_SCORE: 14
ADLS_ACUITY_SCORE: 24
ADLS_ACUITY_SCORE: 12
ADLS_ACUITY_SCORE: 14
ADLS_ACUITY_SCORE: 14
VISION_MANAGEMENT: READING
ADLS_ACUITY_SCORE: 14
ADLS_ACUITY_SCORE: 12
ADLS_ACUITY_SCORE: 26
ADLS_ACUITY_SCORE: 19
ADLS_ACUITY_SCORE: 11
ADLS_ACUITY_SCORE: 14
ADLS_ACUITY_SCORE: 14
ADLS_ACUITY_SCORE: 12
ADLS_ACUITY_SCORE: 20
DOING_ERRANDS_INDEPENDENTLY_DIFFICULTY: YES
ADLS_ACUITY_SCORE: 12
ADLS_ACUITY_SCORE: 14
ADLS_ACUITY_SCORE: 12
ADLS_ACUITY_SCORE: 16
TOILETING_ISSUES: NO
CONCENTRATING,_REMEMBERING_OR_MAKING_DECISIONS_DIFFICULTY: YES
ADLS_ACUITY_SCORE: 14
ADLS_ACUITY_SCORE: 26
ADLS_ACUITY_SCORE: 14
ADLS_ACUITY_SCORE: 14
ADLS_ACUITY_SCORE: 26
ADLS_ACUITY_SCORE: 14
ADLS_ACUITY_SCORE: 14
ADLS_ACUITY_SCORE: 24
ADLS_ACUITY_SCORE: 12
ADLS_ACUITY_SCORE: 19
ADLS_ACUITY_SCORE: 14
ADLS_ACUITY_SCORE: 12
ADLS_ACUITY_SCORE: 14
ADLS_ACUITY_SCORE: 24
ADLS_ACUITY_SCORE: 14
ADLS_ACUITY_SCORE: 15
ADLS_ACUITY_SCORE: 14
ADLS_ACUITY_SCORE: 11
ADLS_ACUITY_SCORE: 14
ADLS_ACUITY_SCORE: 16
ADLS_ACUITY_SCORE: 14
ADLS_ACUITY_SCORE: 10
ADLS_ACUITY_SCORE: 14
ADLS_ACUITY_SCORE: 11
ADLS_ACUITY_SCORE: 14
ADLS_ACUITY_SCORE: 14
ADLS_ACUITY_SCORE: 11
ADLS_ACUITY_SCORE: 14
ADLS_ACUITY_SCORE: 19
ADLS_ACUITY_SCORE: 14
ADLS_ACUITY_SCORE: 24
ADLS_ACUITY_SCORE: 14
ADLS_ACUITY_SCORE: 14
ADLS_ACUITY_SCORE: 19
ADLS_ACUITY_SCORE: 14
ADLS_ACUITY_SCORE: 10
ADLS_ACUITY_SCORE: 14
ADLS_ACUITY_SCORE: 26
ADLS_ACUITY_SCORE: 14
ADLS_ACUITY_SCORE: 15
ADLS_ACUITY_SCORE: 12
ADLS_ACUITY_SCORE: 12
WALKING_OR_CLIMBING_STAIRS: AMBULATION DIFFICULTY, REQUIRES EQUIPMENT;STAIR CLIMBING DIFFICULTY, REQUIRES EQUIPMENT;TRANSFERRING DIFFICULTY, REQUIRES EQUIPMENT
ADLS_ACUITY_SCORE: 24
ADLS_ACUITY_SCORE: 14
ADLS_ACUITY_SCORE: 24
ADLS_ACUITY_SCORE: 12
ADLS_ACUITY_SCORE: 14
ADLS_ACUITY_SCORE: 24
ADLS_ACUITY_SCORE: 14
ADLS_ACUITY_SCORE: 10
ADLS_ACUITY_SCORE: 22
ADLS_ACUITY_SCORE: 14
ADLS_ACUITY_SCORE: 10
ADLS_ACUITY_SCORE: 20
ADLS_ACUITY_SCORE: 14
ADLS_ACUITY_SCORE: 24
ADLS_ACUITY_SCORE: 14
ADLS_ACUITY_SCORE: 12
ADLS_ACUITY_SCORE: 12
ADLS_ACUITY_SCORE: 22
ADLS_ACUITY_SCORE: 14
ADLS_ACUITY_SCORE: 24
ADLS_ACUITY_SCORE: 19
ADLS_ACUITY_SCORE: 12
ADLS_ACUITY_SCORE: 14
WALKING_OR_CLIMBING_STAIRS_DIFFICULTY: YES
ADLS_ACUITY_SCORE: 12
ADLS_ACUITY_SCORE: 10
ADLS_ACUITY_SCORE: 19
ADLS_ACUITY_SCORE: 14
ADLS_ACUITY_SCORE: 15
ADLS_ACUITY_SCORE: 14
ADLS_ACUITY_SCORE: 14
ADLS_ACUITY_SCORE: 12
ADLS_ACUITY_SCORE: 14
ADLS_ACUITY_SCORE: 24
ADLS_ACUITY_SCORE: 12
ADLS_ACUITY_SCORE: 26
ADLS_ACUITY_SCORE: 14
ADLS_ACUITY_SCORE: 19
ADLS_ACUITY_SCORE: 14
ADLS_ACUITY_SCORE: 14
CHANGE_IN_FUNCTIONAL_STATUS_SINCE_ONSET_OF_CURRENT_ILLNESS/INJURY: YES
ADLS_ACUITY_SCORE: 10
ADLS_ACUITY_SCORE: 24
ADLS_ACUITY_SCORE: 14
ADLS_ACUITY_SCORE: 24
ADLS_ACUITY_SCORE: 14
ADLS_ACUITY_SCORE: 26
ADLS_ACUITY_SCORE: 20
ADLS_ACUITY_SCORE: 14
ADLS_ACUITY_SCORE: 12
ADLS_ACUITY_SCORE: 14
ADLS_ACUITY_SCORE: 12
ADLS_ACUITY_SCORE: 14
ADLS_ACUITY_SCORE: 24
ADLS_ACUITY_SCORE: 14
ADLS_ACUITY_SCORE: 11
ADLS_ACUITY_SCORE: 12
ADLS_ACUITY_SCORE: 15
ADLS_ACUITY_SCORE: 14
ADLS_ACUITY_SCORE: 14
WEAR_GLASSES_OR_BLIND: YES
ADLS_ACUITY_SCORE: 14
ADLS_ACUITY_SCORE: 12
ADLS_ACUITY_SCORE: 15
ADLS_ACUITY_SCORE: 14
ADLS_ACUITY_SCORE: 24
ADLS_ACUITY_SCORE: 15
ADLS_ACUITY_SCORE: 14
TRANSFERRING: 1-->ASSISTANCE (EQUIPMENT/PERSON) NEEDED
ADLS_ACUITY_SCORE: 15
NUMBER_OF_TIMES_PATIENT_HAS_FALLEN_WITHIN_LAST_SIX_MONTHS: 1
ADLS_ACUITY_SCORE: 14
ADLS_ACUITY_SCORE: 10
ADLS_ACUITY_SCORE: 12
ADLS_ACUITY_SCORE: 14
ADLS_ACUITY_SCORE: 12
ADLS_ACUITY_SCORE: 14
ADLS_ACUITY_SCORE: 14
ADLS_ACUITY_SCORE: 12

## 2022-01-01 ASSESSMENT — ENCOUNTER SYMPTOMS
ABDOMINAL PAIN: 0
ARTHRALGIAS: 1
BACK PAIN: 0
HEADACHES: 1
WOUND: 1
DYSRHYTHMIAS: 1

## 2022-01-01 ASSESSMENT — NEW YORK HEART ASSOCIATION (NYHA) CLASSIFICATION: NYHA FUNCTIONAL CLASS: I

## 2022-01-26 NOTE — ED NOTES
"[facility]  ED Nurse Handoff Report    Cande Baird is a 86 year old female   ED Chief complaint: Palpitations  . ED Diagnosis:   Final diagnoses:   None     Allergies: No Known Allergies    Code Status: DNR / DNI  Activity level - Baseline/Home:  Independent. Activity Level - Current:   Assist X 1. Lift room needed: No. Bariatric: No   Needed: No   Isolation: No. Infection: Not Applicable.     Vital Signs:   Vitals:    09/17/21 2306 09/17/21 2307 09/17/21 2308 09/17/21 2309   BP:       Pulse: 82 93 90 77   Resp:       Temp:       TempSrc:       SpO2: 94% 94% 93% 93%       Cardiac Rhythm:  ,      Pain level:    Patient confused: No. Patient Falls Risk: Yes.   Elimination Status: Has voided   Patient Report - Initial Complaint: palpitations  . Focused Assessment:   22:42 Cardiac Cardiac - Cardiac WDL: .WDL except (pt c/o palpations at baseline but they were worse today 1 hr PTA) Capillary Refill, General: less than/equal to 3 secs   Chest Pain Assessment - Rating (0-10):  (denies)  Cardiac Monitoring - EKG Monitoring: Yes  Cardiac Regularity: Irregular  Cardiac Rhythm: Atrial fibrillation        22:29 Musculoskeletal Musculoskeletal - Musculoskeletal WDL: .WDL except  General Mobility: generalized weakness (pt c/o feeling ill x1 month.) CMS Intact: Yes      21:07 ED Triage Notes Filed Pt presents via EMS for evaluation of generalized weakness, that became worse tonight. Pt got up to take a pill, then felt nauseous. Felt heart \"fluttering/thumping\". Hx of kidney transplant.        Tests Performed: blood work, xray. Abnormal Results:   Labs Ordered and Resulted from Time of ED Arrival Up to the Time of Departure from the ED   COMPREHENSIVE METABOLIC PANEL - Abnormal; Notable for the following components:       Result Value    Creatinine 1.52 (*)     Glucose 109 (*)     Albumin 3.1 (*)     GFR Estimate 31 (*)     All other components within normal limits   CBC WITH PLATELETS AND DIFFERENTIAL - " Pt made aware that Dr Scott said that a ablation can be done if pt is wanting to do when he comes back in the Spring. Pt to call when wanting to schedule OV with Dr Scott to discuss. Licha   Abnormal; Notable for the following components:    RDW 15.2 (*)     All other components within normal limits   INR - Normal   LACTIC ACID WHOLE BLOOD - Normal   TROPONIN I - Normal   TSH WITH FREE T4 REFLEX - Normal   EXTRA RED TOP TUBE   DIFFERENTIAL   ROUTINE UA WITH MICROSCOPIC REFLEX TO CULTURE   EXTRA GREEN TOP (LITHIUM HEPARIN) TUBE   COVID-19 VIRUS (CORONAVIRUS) BY PCR   PERIPHERAL IV CATHETER   CARDIAC CONTINUOUS MONITORING   PULSE OXIMETRY NURSING   CBC WITH PLATELETS & DIFFERENTIAL    Narrative:     The following orders were created for panel order CBC with platelets differential.  Procedure                               Abnormality         Status                     ---------                               -----------         ------                     CBC with platelets and d...[[id number]]  Abnormal            Final result               Manual Differential[[id number]]                              Final result                 Please view results for these tests on the individual orders.   EXTRA TUBE    Narrative:     The following orders were created for panel order Extra Tube ([place] Draw).  Procedure                               Abnormality         Status                     ---------                               -----------         ------                     Extra Red Top Tube[[id number]]                               Final result               Extra Green Top (Lithium...[[id number]]                                                   Please view results for these tests on the individual orders.     XR Chest 2 Views   Preliminary Result   IMPRESSION: PA and lateral views of the chest were obtained. Stable cardiomediastinal silhouette. Atherosclerotic vascular calcification of the aortic knob. No suspicious focal pulmonary opacities. No significant pleural effusion or pneumothorax.        .   Treatments provided: fluids, diltiazem   Family Comments:  updated by patient  OBS brochure/video  discussed/provided to patient:  yes  ED Medications:   Medications   ondansetron (ZOFRAN) injection 4 mg (4 mg Intravenous Not Given 9/17/21 2300)   0.9% sodium chloride BOLUS (0 mLs Intravenous Stopped 9/17/21 2317)     Followed by   sodium chloride 0.9% infusion ( Intravenous New Bag 9/17/21 2318)   diltiazem (CARDIZEM) 125 mg in sodium chloride 0.7 % 125 mL infusion (0 mg/hr Intravenous Hold 9/17/21 2318)   diltiazem (CARDIZEM) injection 10 mg (10 mg Intravenous Given 9/17/21 2221)     Drips infusing:  Yes, NaCl 0.9% @ 125 mL/hr  For the majority of the shift, the patient's behavior Green. Interventions performed were frequent rounding.    Sepsis treatment initiated: No     Patient tested for COVID 19 prior to admission: YES    ED Nurse Name/Phone Number: Sulema Carroll RN,   11:22 PM    RECEIVING UNIT ED HANDOFF REVIEW    Above ED Nurse Handoff Report was reviewed: Yes  Reviewed by: Rena Armstrong RN on September 18, 2021 at 1:00 AM

## 2022-04-08 NOTE — LETTER
4/8/2022    [facility]  [address] [place] [address]    RE: Cande Baird       Dear Colleague,     I had the pleasure of seeing Cande Baird in the [facility].    Cardiology Clinic Progress Note    Service Date: April 8, 2022    Primary Cardiologist: Dr. Reynaga      Reason for Visit: Follow up for paroxsymal atrial fibrillation    HPI:   I had the pleasure of seeing Ms. Cande Baird in the clinic today. She is a very pleasant 86 year old female with a past medical history notable for hypertension, chronic kidney disease with history of kidney transplant in 1997, and paroxysmal atrial fibrillation.     She was hospitalized at the [facility] in August 2021 with pyelonephritis, nonoliguric acute kidney injury, and hypertensive urgency. She was started on amlodipine 2.5 mg daily for her hypertension, and upon discharge, a 5-day Zio patch monitor was placed. This showed episodes of SVT and paroxysmal atrial fibrillation with periods of rapid ventricular rates. [place] of A.Fib was approximately 22% and the longest episode lasted 20 hours. Intermittently, heart rate went up to the 180s, and her symptoms correlated with both the atrial fibrillation and SVT.    She then presented to [facility] on 9/17/21 with generalized weakness and palpitations and was found to be in A.Fib with RVR. Cardiology was consulted and she was seen by Dr Reynaga. TTE showed preserved biventricular systolic function, no significant valve disease, and moderately dilated ascending aorta. She was started on metoprolol XL 25mg BID and Eliquis 2.5 mg BID (lower dose due to age, body weight and CKD). Amlodipine increased due to elevated blood pressures.     She endorsed some atypical chest discomfort in follow up so a Lexiscan nuclear stress test was completed in 10/2020, which was negative for inducible myocardial ischemia or infarction.      Today, Ms. Baird presents to the  clinic accompanied by her daughter, Juanita, in routine follow up. She tells me that she has been feeling well from a cardiac standpoint since her last visit in November. She was having issues with elevated blood pressure readings over the past couple of months and so metoprolol was increased from 50 mg to 100 mg BID by another provider. Her blood pressure readings have since been better, primarily around the 120s to low 130s systolic in her checks at home. Pulse has reportedly been around 60-70 bpm.     Her main issue recently has been progression of her CKD. Following the episode of pyelonephritis of her transplanted kidney, her creatinine unfortunately has been persistently around the 2.5 to 3.1 range. She has been following closely with nephrology for this. She has noticed mild swelling in her feet and ankles at times. She otherwise denies symptoms of chest pain, palpitations, presyncope, syncope, shortness of breath, dyspnea on exertion, orthopnea, or PND.      ASSESSMENT AND PLAN:  1.  Paroxysmal atrial fibrillation  - Continue with rate control strategy with metoprolol succinate 100 mg twice daily and anticoagulation with renally dose adjusted apixaban 2.5 mg twice daily.     2. Hypertension  - Adequately controlled. Continue with metoprolol as above and amlodipine 5 mg daily.    3. Mild lower extremity edema  - Suspect this may be multifactorial with side effect of amlodipine, possible venous insuffiency, and possibly some degree of fluid retention secondary to her worsening renal function, although she has not had significant   shortness of breath. Discussed the option of a trial off amlodipine or switching to an alternative antihypertensive agent. She felt symptoms are mild and was okay with continued monitoring for the time being.     4. CKD, briefly on dialysis in the past and s/p kidney transplant in 1997  - Follows with nephrology. Unfortunately with recent progression with creatinine around 2.5-3.1  following pyelonephritis in her transplanted kidney.       Thank you for the opportunity to participate in this pleasant patient's care. I will plan to have her follow-up with Dr. Reynaga in about 3 months. I encouraged the patient or daughter to call the clinic with questions or concerns in the meantime, and we would be happy to see her sooner if needed.     40 total minutes was spent today including chart review, precharting, history and exam, post visit documentation, and reviewing studies as outlined above.     AMEE Ross, CNP   Nurse Practitioner  [facility]  Pager: [phone]  Text Page  (8am - 5pm, M-F)    Orders this Visit:  No orders of the defined types were placed in this encounter.    Orders Placed This Encounter   Medications     amLODIPine (NORVASC) 5 MG tablet     metoprolol succinate ER (TOPROL-XL) 100 MG 24 hr tablet     Sig: Take 100 mg by mouth 2 times daily     furosemide (LASIX) 20 MG tablet     Sig: Take 20 mg by mouth daily     Medications Discontinued During This Encounter   Medication Reason     metoprolol succinate ER (TOPROL-XL) 50 MG 24 hr tablet Medication Reconciliation Clean Up     Encounter Diagnosis   Name Primary?     Essential hypertension      CURRENT MEDICATIONS:  Current Outpatient Medications   Medication Sig Dispense Refill     amLODIPine (NORVASC) 5 MG tablet        apixaban ANTICOAGULANT (ELIQUIS ANTICOAGULANT) 2.5 MG tablet Take 1 tablet (2.5 mg) by mouth 2 times daily 180 tablet 3     azaTHIOprine (IMURAN) 50 MG tablet Take 1 tablet (50 mg) by mouth daily 90 tablet 0     furosemide (LASIX) 20 MG tablet Take 20 mg by mouth daily       GENGRAF (BRAND) 25 MG CAPSULE Take 25 mg by mouth 2 times daily At 1000 and 2200       metoprolol succinate ER (TOPROL-XL) 100 MG 24 hr tablet Take 100 mg by mouth 2 times daily       ALLERGIES  No Known Allergies    PAST MEDICAL, SURGICAL, FAMILY HISTORY:  History was reviewed and updated as needed, see medical  "record.    SOCIAL HISTORY:  Social History     Socioeconomic History     Marital status:      Spouse name: Not on file     Number of children: 2     Years of education: Not on file     Highest education level: Not on file   Occupational History     Not on file   Tobacco Use     Smoking status: Former Smoker     Packs/day: 0.00     Years: 0.00     Pack years: 0.00     Types: Cigarettes     Quit date: 1988     Years since quittin.8     Smokeless tobacco: Former User     Quit date: 10/17/1987   Vaping Use     Vaping Use: Never used   Substance and Sexual Activity     Alcohol use: Yes     Comment: 1 or 2 glasses of wine a week or less     Drug use: No     Sexual activity: Not Currently     Partners: Male   Other Topics Concern     Parent/sibling w/ CABG, MI or angioplasty before 65F 55M? No   Social History Narrative     Not on file     Social Determinants of Health     Financial Resource Strain: Not on file   Food Insecurity: Not on file   Transportation Needs: Not on file   Physical Activity: Not on file   Stress: Not on file   Social Connections: Not on file   Intimate Partner Violence: Not on file   Housing Stability: Not on file     Review of Systems:  Skin:  Positive for itching   Eyes:  Negative    ENT:  Negative    Respiratory:  Negative    Cardiovascular:    fatigue;dizziness;edema;Positive for  Gastroenterology: Negative    Genitourinary:  Positive for urinary frequency;nocturia  Musculoskeletal:  Negative    Neurologic:  Negative    Psychiatric:  Positive for excessive stress;anxiety;depression  Heme/Lymph/Imm:  Positive for easy bruising  Endocrine:  Negative       Physical Exam:  Vitals: /80 (BP Location: Right arm, Patient Position: Sitting, Cuff Size: Adult Small)   Pulse 70   Ht 1.6 m (5' 3\")   Wt 54 kg (119 lb)   SpO2 99%   BMI 21.08 kg/m     Wt Readings from Last 4 Encounters:   22 54 kg (119 lb)   21 54.7 kg (120 lb 9.6 oz)   10/27/21 53.7 kg (118 lb 6.4 oz) "   09/30/21 55.1 kg (121 lb 8 oz)     CONSTITUTIONAL: Appears her stated age, well nourished, and in no acute distress.  HEENT: Pupils equal, round. Sclerae nonicteric.    C/V:  Regular rate and rhythm, normal S1 and S2, no S3 or S4, no murmur, rub or gallop.   RESP: Respirations are unlabored. Lungs are clear to auscultation bilaterally without wheezing, rales, or rhonchi.   EXTREM: Trace to 1+ pedal and ankle edema bilaterally. No clubbing or cyanosis.  NEURO: Alert and oriented, cooperative. Gait steady. No gross focal deficits.   PSYCH: Affect appropriate. Mentation normal. Responds to questions appropriately.  SKIN: Warm and dry. No apparent rashes or bruising.    Recent Lab Results:  LIPID RESULTS:  Lab Results   Component Value Date    CHOL 220 (H) 08/15/2017    HDL 79 08/15/2017     (H) 08/15/2017    TRIG 139 08/15/2017     LIVER ENZYME RESULTS:  Lab Results   Component Value Date    AST 39 09/17/2021    AST 15 01/14/2020    ALT 33 09/17/2021    ALT 14 01/14/2020     CBC RESULTS:  Lab Results   Component Value Date    WBC 6.5 09/19/2021    WBC 9.1 01/14/2020    RBC 4.03 09/19/2021    RBC 4.42 01/14/2020    HGB 11.9 09/19/2021    HGB 13.3 01/14/2020    HCT 37.9 09/19/2021    HCT 42.6 01/14/2020    MCV 94 09/19/2021    MCV 96 01/14/2020    MCH 29.5 09/19/2021    MCH 30.1 01/14/2020    MCHC 31.4 (L) 09/19/2021    MCHC 31.2 (L) 01/14/2020    RDW 15.6 (H) 09/19/2021    RDW 15.1 (H) 01/14/2020     09/19/2021     01/14/2020     BMP RESULTS:  Lab Results   Component Value Date     09/19/2021     (L) 01/14/2020    POTASSIUM 3.7 09/19/2021    POTASSIUM 4.4 01/14/2020    CHLORIDE 104 09/19/2021    CHLORIDE 96 01/14/2020    CO2 25 09/19/2021    CO2 28 01/14/2020    ANIONGAP 7 09/19/2021    ANIONGAP 8 01/14/2020    GLC 91 09/19/2021     (H) 01/14/2020    BUN 18 09/19/2021    BUN 15 01/14/2020    CR 1.51 (H) 09/19/2021    CR 0.79 01/14/2020    GFRESTIMATED 32 (L) 09/19/2021     GFRESTIMATED 70 01/14/2020    GFRESTBLACK 81 01/14/2020    LETI 9.0 09/19/2021    LETI 9.6 01/14/2020     This note was completed in part using Dragon voice recognition software. Although reviewed after completion, some word and grammatical errors may occur.      Thank you for allowing me to participate in the care of your patient.      Sincerely,     Andres Ashley NP     [facility]  cc:   Andres Ashley NP  [id number] [id number]

## 2022-04-08 NOTE — PATIENT INSTRUCTIONS
Thank you for your visit with the [facility] today.    Today's plan:   1. Continue with your current medications.  2. Follow up with one of the cardiologists at the [facility] in about 3 months for a routine check in.  3. Continue to follow up with your Nephrology regarding your kidney issues.    If you have questions or concerns, please do not hesitate to call my nurse team at [phone].     Scheduling phone number: [phone]    It was a pleasure seeing you today!     AMEE Ross, CNP  Nurse Practitioner  [facility]  April 8, 2022  ________________________________________________________

## 2022-04-08 NOTE — PROGRESS NOTES
Cardiology Clinic Progress Note    Service Date: April 8, 2022    Primary Cardiologist: Dr. Reynaga      Reason for Visit: Follow up for paroxsymal atrial fibrillation    HPI:   I had the pleasure of seeing Ms. Cande Baird in the clinic today. She is a very pleasant 86 year old female with a past medical history notable for hypertension, chronic kidney disease with history of kidney transplant in 1997, and paroxysmal atrial fibrillation.     She was hospitalized at the [facility] in August 2021 with pyelonephritis, nonoliguric acute kidney injury, and hypertensive urgency. She was started on amlodipine 2.5 mg daily for her hypertension, and upon discharge, a 5-day Zio patch monitor was placed. This showed episodes of SVT and paroxysmal atrial fibrillation with periods of rapid ventricular rates. [place] of A.Fib was approximately 22% and the longest episode lasted 20 hours. Intermittently, heart rate went up to the 180s, and her symptoms correlated with both the atrial fibrillation and SVT.    She then presented to [facility] on 9/17/21 with generalized weakness and palpitations and was found to be in A.Fib with RVR. Cardiology was consulted and she was seen by Dr Reynaga. TTE showed preserved biventricular systolic function, no significant valve disease, and moderately dilated ascending aorta. She was started on metoprolol XL 25mg BID and Eliquis 2.5 mg BID (lower dose due to age, body weight and CKD). Amlodipine increased due to elevated blood pressures.     She endorsed some atypical chest discomfort in follow up so a Lexiscan nuclear stress test was completed in 10/2020, which was negative for inducible myocardial ischemia or infarction.      Today, Ms. Baird presents to the clinic accompanied by her daughter, Juanita, in routine follow up. She tells me that she has been feeling well from a cardiac standpoint since her last visit in November. She was having issues with elevated blood  pressure readings over the past couple of months and so metoprolol was increased from 50 mg to 100 mg BID by another provider. Her blood pressure readings have since been better, primarily around the 120s to low 130s systolic in her checks at home. Pulse has reportedly been around 60-70 bpm.     Her main issue recently has been progression of her CKD. Following the episode of pyelonephritis of her transplanted kidney, her creatinine unfortunately has been persistently around the 2.5 to 3.1 range. She has been following closely with nephrology for this. She has noticed mild swelling in her feet and ankles at times. She otherwise denies symptoms of chest pain, palpitations, presyncope, syncope, shortness of breath, dyspnea on exertion, orthopnea, or PND.      ASSESSMENT AND PLAN:  1.  Paroxysmal atrial fibrillation  - Continue with rate control strategy with metoprolol succinate 100 mg twice daily and anticoagulation with renally dose adjusted apixaban 2.5 mg twice daily.     2. Hypertension  - Adequately controlled. Continue with metoprolol as above and amlodipine 5 mg daily.    3. Mild lower extremity edema  - Suspect this may be multifactorial with side effect of amlodipine, possible venous insuffiency, and possibly some degree of fluid retention secondary to her worsening renal function, although she has not had significant   shortness of breath. Discussed the option of a trial off amlodipine or switching to an alternative antihypertensive agent. She felt symptoms are mild and was okay with continued monitoring for the time being.     4. CKD, briefly on dialysis in the past and s/p kidney transplant in 1997  - Follows with nephrology. Unfortunately with recent progression with creatinine around 2.5-3.1 following pyelonephritis in her transplanted kidney.       Thank you for the opportunity to participate in this pleasant patient's care. I will plan to have her follow-up with Dr. Reynaga in about 3 months. I  encouraged the patient or daughter to call the clinic with questions or concerns in the meantime, and we would be happy to see her sooner if needed.     40 total minutes was spent today including chart review, precharting, history and exam, post visit documentation, and reviewing studies as outlined above.     AMEE Ross, CNP   Nurse Practitioner  [facility]  Pager: [phone]  Text Page  (8am - 5pm, M-F)    Orders this Visit:  No orders of the defined types were placed in this encounter.    Orders Placed This Encounter   Medications     amLODIPine (NORVASC) 5 MG tablet     metoprolol succinate ER (TOPROL-XL) 100 MG 24 hr tablet     Sig: Take 100 mg by mouth 2 times daily     furosemide (LASIX) 20 MG tablet     Sig: Take 20 mg by mouth daily     Medications Discontinued During This Encounter   Medication Reason     metoprolol succinate ER (TOPROL-XL) 50 MG 24 hr tablet Medication Reconciliation Clean Up     Encounter Diagnosis   Name Primary?     Essential hypertension      CURRENT MEDICATIONS:  Current Outpatient Medications   Medication Sig Dispense Refill     amLODIPine (NORVASC) 5 MG tablet        apixaban ANTICOAGULANT (ELIQUIS ANTICOAGULANT) 2.5 MG tablet Take 1 tablet (2.5 mg) by mouth 2 times daily 180 tablet 3     azaTHIOprine (IMURAN) 50 MG tablet Take 1 tablet (50 mg) by mouth daily 90 tablet 0     furosemide (LASIX) 20 MG tablet Take 20 mg by mouth daily       GENGRAF (BRAND) 25 MG CAPSULE Take 25 mg by mouth 2 times daily At 1000 and 2200       metoprolol succinate ER (TOPROL-XL) 100 MG 24 hr tablet Take 100 mg by mouth 2 times daily       ALLERGIES  No Known Allergies    PAST MEDICAL, SURGICAL, FAMILY HISTORY:  History was reviewed and updated as needed, see medical record.    SOCIAL HISTORY:  Social History     Socioeconomic History     Marital status:      Spouse name: Not on file     Number of children: 2     Years of education: Not on file     Highest education  "level: Not on file   Occupational History     Not on file   Tobacco Use     Smoking status: Former Smoker     Packs/day: 0.00     Years: 0.00     Pack years: 0.00     Types: Cigarettes     Quit date: 1988     Years since quittin.8     Smokeless tobacco: Former User     Quit date: 10/17/1987   Vaping Use     Vaping Use: Never used   Substance and Sexual Activity     Alcohol use: Yes     Comment: 1 or 2 glasses of wine a week or less     Drug use: No     Sexual activity: Not Currently     Partners: Male   Other Topics Concern     Parent/sibling w/ CABG, MI or angioplasty before 65F 55M? No   Social History Narrative     Not on file     Social Determinants of Health     Financial Resource Strain: Not on file   Food Insecurity: Not on file   Transportation Needs: Not on file   Physical Activity: Not on file   Stress: Not on file   Social Connections: Not on file   Intimate Partner Violence: Not on file   Housing Stability: Not on file     Review of Systems:  Skin:  Positive for itching   Eyes:  Negative    ENT:  Negative    Respiratory:  Negative    Cardiovascular:    fatigue;dizziness;edema;Positive for  Gastroenterology: Negative    Genitourinary:  Positive for urinary frequency;nocturia  Musculoskeletal:  Negative    Neurologic:  Negative    Psychiatric:  Positive for excessive stress;anxiety;depression  Heme/Lymph/Imm:  Positive for easy bruising  Endocrine:  Negative       Physical Exam:  Vitals: /80 (BP Location: Right arm, Patient Position: Sitting, Cuff Size: Adult Small)   Pulse 70   Ht 1.6 m (5' 3\")   Wt 54 kg (119 lb)   SpO2 99%   BMI 21.08 kg/m     Wt Readings from Last 4 Encounters:   22 54 kg (119 lb)   21 54.7 kg (120 lb 9.6 oz)   10/27/21 53.7 kg (118 lb 6.4 oz)   21 55.1 kg (121 lb 8 oz)     CONSTITUTIONAL: Appears her stated age, well nourished, and in no acute distress.  HEENT: Pupils equal, round. Sclerae nonicteric.    C/V:  Regular rate and rhythm, normal S1 " and S2, no S3 or S4, no murmur, rub or gallop.   RESP: Respirations are unlabored. Lungs are clear to auscultation bilaterally without wheezing, rales, or rhonchi.   EXTREM: Trace to 1+ pedal and ankle edema bilaterally. No clubbing or cyanosis.  NEURO: Alert and oriented, cooperative. Gait steady. No gross focal deficits.   PSYCH: Affect appropriate. Mentation normal. Responds to questions appropriately.  SKIN: Warm and dry. No apparent rashes or bruising.    Recent Lab Results:  LIPID RESULTS:  Lab Results   Component Value Date    CHOL 220 (H) 08/15/2017    HDL 79 08/15/2017     (H) 08/15/2017    TRIG 139 08/15/2017     LIVER ENZYME RESULTS:  Lab Results   Component Value Date    AST 39 09/17/2021    AST 15 01/14/2020    ALT 33 09/17/2021    ALT 14 01/14/2020     CBC RESULTS:  Lab Results   Component Value Date    WBC 6.5 09/19/2021    WBC 9.1 01/14/2020    RBC 4.03 09/19/2021    RBC 4.42 01/14/2020    HGB 11.9 09/19/2021    HGB 13.3 01/14/2020    HCT 37.9 09/19/2021    HCT 42.6 01/14/2020    MCV 94 09/19/2021    MCV 96 01/14/2020    MCH 29.5 09/19/2021    MCH 30.1 01/14/2020    MCHC 31.4 (L) 09/19/2021    MCHC 31.2 (L) 01/14/2020    RDW 15.6 (H) 09/19/2021    RDW 15.1 (H) 01/14/2020     09/19/2021     01/14/2020     BMP RESULTS:  Lab Results   Component Value Date     09/19/2021     (L) 01/14/2020    POTASSIUM 3.7 09/19/2021    POTASSIUM 4.4 01/14/2020    CHLORIDE 104 09/19/2021    CHLORIDE 96 01/14/2020    CO2 25 09/19/2021    CO2 28 01/14/2020    ANIONGAP 7 09/19/2021    ANIONGAP 8 01/14/2020    GLC 91 09/19/2021     (H) 01/14/2020    BUN 18 09/19/2021    BUN 15 01/14/2020    CR 1.51 (H) 09/19/2021    CR 0.79 01/14/2020    GFRESTIMATED 32 (L) 09/19/2021    GFRESTIMATED 70 01/14/2020    GFRESTBLACK 81 01/14/2020    LETI 9.0 09/19/2021    LETI 9.6 01/14/2020     This note was completed in part using Dragon voice recognition software. Although reviewed after completion, some  word and grammatical errors may occur.

## 2022-04-20 PROBLEM — S72.145A CLOSED NONDISPLACED INTERTROCHANTERIC FRACTURE OF LEFT FEMUR, INITIAL ENCOUNTER (H): Status: ACTIVE | Noted: 2022-01-01

## 2022-04-20 PROBLEM — S72.142A CLOSED INTERTROCHANTERIC FRACTURE OF HIP, LEFT, INITIAL ENCOUNTER (H): Status: ACTIVE | Noted: 2022-01-01

## 2022-04-20 PROBLEM — Z79.01 ANTICOAGULATED: Status: ACTIVE | Noted: 2022-01-01

## 2022-04-20 PROBLEM — Z94.0 HISTORY OF KIDNEY TRANSPLANT: Status: ACTIVE | Noted: 2022-01-01

## 2022-04-20 PROBLEM — I48.91 ATRIAL FIBRILLATION, UNSPECIFIED TYPE (H): Status: ACTIVE | Noted: 2022-01-01

## 2022-04-20 NOTE — PHARMACY-ADMISSION MEDICATION HISTORY
Admission medication history interview status for this patient is complete. See [facility] admission navigator for allergy information, prior to admission medications and immunization status.     Medication history interview done, indicate source(s): Family(daughter)  Medication history resources (including written lists, pill bottles, clinic record):None  [facility]    Changes made to PTA medication list:  Added: none  Changed: azathioprine to hs  Reported as Not Taking: none  Removed: none    Actions taken by pharmacist (provider contacted, etc):None     Additional medication history information:None    Medication reconciliation/reorder completed by provider prior to medication history?  Y   (Y/N)     Prior to Admission medications    Medication Sig Last Dose Taking? Auth Provider   amLODIPine (NORVASC) 5 MG tablet Take 5 mg by mouth daily 4/20/2022 at Unknown time Yes Reported, Patient   apixaban ANTICOAGULANT (ELIQUIS ANTICOAGULANT) 2.5 MG tablet Take 1 tablet (2.5 mg) by mouth 2 times daily 4/20/2022 at am Yes Andres Ashley, NP   azaTHIOprine (IMURAN) 50 MG tablet Take 50 mg by mouth At Bedtime 4/19/2022 at Unknown time Yes Unknown, Entered By History   furosemide (LASIX) 20 MG tablet Take 20 mg by mouth daily 4/20/2022 at Unknown time Yes Reported, Patient   GENGRAF (BRAND) 25 MG CAPSULE Take 25 mg by mouth 2 times daily 4/20/2022 at am Yes Unknown, Entered By History   metoprolol succinate ER (TOPROL-XL) 100 MG 24 hr tablet Take 100 mg by mouth 2 times daily 4/20/2022 at am Yes Reported, Patient

## 2022-04-20 NOTE — LETTER
BRIANA AndradeW, LSW  Inpatient Care Coordination  Ortho Unit, [facility]  [phone]

## 2022-04-20 NOTE — ED PROVIDER NOTES
History   Chief Complaint:  Fall (Hip pain)       The history is provided by the patient.      Cande Baird is a 86 year old female on Eliquis with history of atrial fibrillation, basal cell carcinoma, and stage 3b CKD s/p kidney transplant who presents with a fall. The patient reports that she was sitting on her bed this morning when she slipped off and fell on her left hip. She also endorses a headache and a cut to her left arm, which bled. She did not hit her head or lose consciousness. Her  was admitted to the ED last evening and she had returned home to get him some new clothes when she experienced her fall. She denies chest pain, abdominal pain, or back pain. She is on Eliquis.    Review of Systems   Cardiovascular: Negative for chest pain.   Gastrointestinal: Negative for abdominal pain.   Musculoskeletal: Positive for arthralgias (L hip). Negative for back pain.   Skin: Positive for wound (L arm).   Neurological: Positive for headaches. Negative for syncope.   All other systems reviewed and are negative.    Allergies:  No known drug allergies.    Medications:  Norvasc  Eliquis  Imuran  Lasix  Gengraf  Toprol XL    Past Medical History:     Arthritis   Atrial fibrillation with RVR   Hypertension  Glomerulonephritis   Basal cell carcinoma   Stage 3b chronic kidney disease   Arthritis  Secondary hyperparathyroidism of renal origin  Anxiety  Pyelonephritis    Hyponatremia    Past Surgical History:    Kidney transplant  Squeamish cell biopsy and removal  Colonoscopy  Laparoscopic ovarian cystectomy     Family History:    Mother: type 2 diabetes  Father: peptic ulcer disease  Sister: unspecified diabetes, RA    Social History:  The patient presents to the ED alone via EMS.   She lives with her  at [facility] in [place].    Physical Exam     Patient Vitals for the past 24 hrs:   BP Temp Temp src Pulse Resp SpO2   04/20/22 1639 (!) 150/59 97  F (36.1  C) Temporal 73 18 94 %  "  04/20/22 1615 (!) 144/72 -- -- 71 -- 95 %   04/20/22 1600 (!) 141/69 -- -- 65 -- 93 %   04/20/22 1545 122/56 -- -- 61 -- 91 %   04/20/22 1530 131/66 -- -- 69 -- 94 %   04/20/22 1515 133/65 -- -- 67 -- 92 %   04/20/22 1500 133/66 -- -- 66 -- 91 %   04/20/22 1430 -- -- -- -- -- 95 %   04/20/22 1415 131/58 -- -- 72 -- 94 %   04/20/22 1152 (!) 143/75 97.6  F (36.4  C) Oral 71 18 95 %       Physical Exam  General: Patient is alert, awake and interactive when I enter the room  Head: The scalp, face, and head appear normal. Atraumatic.   Eyes: The pupils are equal, round, and reactive to light. Conjunctivae and sclerae are normal  ENT: No hemotympanum or signs basilar skull fracture. The oropharynx is normal without erythema. No tenderness to palpation of the face, nose or jaw.   Neck: Normal range of motion. No cervical midline tenderness.   CV: Regular rate.   Resp: Lungs are clear without wheezes or rales. No distress. No crepitance.   GI: Abdomen is soft, no rigidity, guarding, or rebound. No contusion. No distension. No tenderness to palpation in any quadrant.     MS: Tenderness to the left groin overlying the proximal femur.  Significant pain with passive range of motion of the left hip.  No significant pain or tenderness with palpation or range of motion with the left knee, left ankle or right lower extremity.  Normal tone. Joints grossly normal without effusions. No asymmetric leg swelling, calf or thigh tenderness. Normal motor assessment of all extremities. No C/T/L tenderness in midline  Skin: Skin tear to the left forearm. Normal capillary refill noted  Neuro: GCS 15.  CN\"s II-XII intact. Speech is normal and fluent. Face is symmetric. Strength is normal and symmetric.   Psych: Normal affect.  Appropriate interactions.    Emergency Department Course     Imaging:  XR Pelvis and Hip Left 1 View   Final Result   IMPRESSION: Nondisplaced left intertrochanteric fracture extending to   the base of the femoral neck. "      Mild to moderate bilateral hip degenerative changes. Probable   benign-appearing chronic calcification in the right pelvis.      JOSE VARELA MD            SYSTEM ID:  [id number]      Head CT w/o contrast   Final Result   IMPRESSION:      1. No evidence of acute intracranial hemorrhage, mass, or herniation.   2. Mild nonspecific white matter changes likely due to chronic   microvascular ischemic disease.    3. 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      RASHAUN SUMMERS MD            SYSTEM ID:  [id number]      Echocardiogram Complete    (Results Pending)     Report per radiology    Laboratory:  Labs Ordered and Resulted from Time of ED Arrival to Time of ED Departure   INR - Abnormal       Result Value    INR 1.41 (*)    BASIC METABOLIC PANEL - Abnormal    Sodium 133      Potassium 3.9      Chloride 103      Carbon Dioxide (CO2) 23      Anion Gap 7      Urea Nitrogen 53 (*)     Creatinine 3.31 (*)     Calcium 8.9      Glucose 108 (*)     GFR Estimate 13 (*)    CBC WITH PLATELETS AND DIFFERENTIAL - Abnormal    WBC Count 13.5 (*)     RBC Count 3.24 (*)     Hemoglobin 10.0 (*)     Hematocrit 31.4 (*)     MCV 97      MCH 30.9      MCHC 31.8      RDW 14.7      Platelet Count 295      % Neutrophils 82      % Lymphocytes 5      % Monocytes 11      % Eosinophils 1      % Basophils 0      % Immature Granulocytes 1      NRBCs per 100 WBC 0      Absolute Neutrophils 11.2 (*)     Absolute Lymphocytes 0.7 (*)     Absolute Monocytes 1.4 (*)     Absolute Eosinophils 0.1      Absolute Basophils 0.0      Absolute Immature Granulocytes 0.1      Absolute NRBCs 0.0     COVID-19 VIRUS (CORONAVIRUS) BY PCR - Normal    SARS CoV2 PCR Negative          Emergency Department Course:    Reviewed:  I reviewed nursing notes, vitals, past medical history and Care Everywhere    Assessments:  1200 I obtained history and examined the patient as noted above.   1350 I rechecked the patient and explained findings.     Consults:  1353 I  spoke with DAVI Gr, [facility], regarding the patient.  1401 I spoke with Dr. Dowd, hospitalist, who agreed to admit the patient.     Interventions:  Medications   Vitamin D3 (CHOLECALCIFEROL) tablet 50 mcg (has no administration in time range)   lidocaine 1 % 0.1-1 mL (has no administration in time range)   lidocaine (LMX4) cream (has no administration in time range)   sodium chloride (PF) 0.9% PF flush 3 mL (has no administration in time range)   sodium chloride (PF) 0.9% PF flush 3 mL (has no administration in time range)   melatonin tablet 1 mg (has no administration in time range)   sodium chloride 0.9% infusion (has no administration in time range)   acetaminophen (TYLENOL) tablet 975 mg (has no administration in time range)   HYDROmorphone (DILAUDID) injection 0.2 mg (has no administration in time range)   docusate sodium (COLACE) capsule 100 mg (has no administration in time range)   ondansetron (ZOFRAN-ODT) ODT tab 4 mg (has no administration in time range)     Or   ondansetron (ZOFRAN) injection 4 mg (has no administration in time range)   amLODIPine (NORVASC) tablet 5 mg (has no administration in time range)   azaTHIOprine (IMURAN) tablet 50 mg (has no administration in time range)   cycloSPORINE modified (GENGRAF BRAND) capsule 25 mg (has no administration in time range)   metoprolol succinate ER (TOPROL-XL) 24 hr tablet 100 mg (has no administration in time range)   naloxone (NARCAN) injection 0.2 mg (has no administration in time range)     Or   naloxone (NARCAN) injection 0.4 mg (has no administration in time range)     Or   naloxone (NARCAN) injection 0.2 mg (has no administration in time range)     Or   naloxone (NARCAN) injection 0.4 mg (has no administration in time range)   acetaminophen (TYLENOL) tablet 975 mg (650 mg Oral Given 4/20/22 1248)   HYDROmorphone (DILAUDID) injection 0.2 mg (0.2 mg Intravenous Given 4/20/22 1446)       Disposition:  The patient was admitted to the  [facility] under the care of Dr. Dowd.     Impression & Plan     Medical Decision Making:  Patient is a 86-year-old woman who is anticoagulated on Eliquis due to atrial fibrillation with history of chronic kidney disease status post kidney transplant on azathioprine for immunosuppression who presents to the emergency department with left hip pain following a mechanical fall.  Patient reports that she was sitting on the edge of the bed and slipped off onto her left hip.  She denies any head trauma.  On initial evaluation she has significant pain in her left hip and decreased range of motion secondary to her discomfort.  X-ray of the hip reveals nondisplaced intertrochanteric fracture with extension into the femoral neck.  CT of the head was obtained due to her trauma and history of anticoagulation which thankfully did not reveal any evidence of intracranial hemorrhage or skull fracture.  Patient will be admitted for further evaluation and treatment of her hip fracture.  No negation for emergent reversal of her anticoagulation at this juncture.    Diagnosis:    ICD-10-CM    1. Closed nondisplaced intertrochanteric fracture of left femur, initial encounter (H)  S72.145A Case Request: OPEN REDUCTION INTERNAL FIXATION, FRACTURE, FEMUR, PROXIMAL     Case Request: OPEN REDUCTION INTERNAL FIXATION, FRACTURE, FEMUR, PROXIMAL   2. Closed intertrochanteric fracture of hip, left, initial encounter (H)  S72.142A    3. History of kidney transplant  Z94.0    4. Atrial fibrillation, unspecified type (H)  I48.91    5. Anticoagulated  Z79.01        Scribe Disclosure:  Kaden HERRMANN, am serving as a scribe at 12:00 PM on 4/20/2022 to document services personally performed by Chauncey Denny MD based on my observations and the provider's statements to me.          Chauncey Denny MD  04/20/22 2077

## 2022-04-20 NOTE — H&P
[facility]    History and Physical - Hospitalist Service       Date of Admission:  4/20/2022    Assessment & Plan       Cande Baird is a 86 year old female w/hx afib on chronic anticoagulation, CKD stage 3b s/o kidney transplant on immunosuppression, HTN who presents after fall and found to have L hip fracture    L hip fracture  -2/2 fall from bed, XR with fracture noted  -ortho contacted by ED, consult placed  -pain control and await surgical intervention but may be a couple days due to eliquis (now held)  -noted to have murmur, will get ECHO for completeness  -of note had negative lexiscan 9/30/2021 which is reassuring    CKD stage 3 w/hx renal transplant on chronic immunosuppression  -resume home imuran and gengraf  -awaiting labs from today so cont gentle fluids and hold home lasix today    afib on chronic anticoagulation  -resume home metoprolol  -hold home eliquis as above, was on modified dosing due to renal disese    Hx HTN  -resume home norvasc, BB     Diet:   ok to eat, ortho states no surgery today  DVT Prophylaxis: Pneumatic Compression Devices  Benjamin Catheter: Not present  Central Lines: None  Cardiac Monitoring: None  Code Status:   full code     The patient's care was discussed with the Patient and Patient's Family.    Harry Dowd,   Hospitalist Service  [facility]  Securely message with the Vocera Web Console (learn more here)  Text page via [employer] Paging/Directory         ______________________________________________________________________    Chief Complaint   fall    History is obtained from the patient    History of Present Illness   Cande Baird is a 86 year old female w/hx afib on chronic anticoagulation, CKD stage 3b s/o kidney transplant on immunosuppression, HTN who presents after fall. She was at home at what sounds like a senior living residence when she accidentally fell off of her bed while sitting and landed on her R hip/buttock area.  She had immediate pain into her leg and was unable to stand up, but denies any CP, sob, fever, chills or other complaints.     In ED had XR showing non displaced L intertrochanteric fracture extending to the base of the femoral neck. Labs are pending. ED talked to ortho who stated they may not be able to see her today but she would need to wait for the eliquis to wash out anyways before surgery. CT brain was done due to fall on eliquis and negative for acute findings. She's in good spirits but has a lot of questions, pain is pretty well controlled unless trying to move in bed    Review of Systems    The 10 point Review of Systems is negative other than noted in the HPI or here.     Past Medical History    I have reviewed this patient's medical history and updated it with pertinent information if needed.   Past Medical History:   Diagnosis Date     Arthritis      Atrial fibrillation with RVR (H) 2021     Benign essential hypertension      Glomerulonephritis      Renal transplant recipient      Skin cancer     does not see dermatologist     Stage 3b chronic kidney disease (H)        Past Surgical History   I have reviewed this patient's surgical history and updated it with pertinent information if needed.  Past Surgical History:   Procedure Laterality Date     BIOPSY  6 months    Squeamish cell-removed     COLONOSCOPY  5/15/2013     LAPAROSCOPIC CYSTECTOMY OVARIAN (BENIGN)       TRANSPLANT  1987    Kidney       Social History   I have reviewed this patient's social history and updated it with pertinent information if needed.  Social History     Tobacco Use     Smoking status: Former Smoker     Packs/day: 0.00     Years: 0.00     Pack years: 0.00     Types: Cigarettes     Quit date: 1988     Years since quittin.9     Smokeless tobacco: Former User     Quit date: 10/17/1987   Vaping Use     Vaping Use: Never used   Substance Use Topics     Alcohol use: Yes     Comment: 1 or 2 glasses of  wine a week or less     Drug use: No       Family History   I have reviewed this patient's family history and updated it with pertinent information if needed.  Family History   Problem Relation Age of Onset     Diabetes Mother 60     Peptic Ulcer Disease Father      Diabetes Sister      Rheumatoid Arthritis Sister      Diabetes Sister        Prior to Admission Medications   Prior to Admission Medications   Prescriptions Last Dose Informant Patient Reported? Taking?   GENGRAF (BRAND) 25 MG CAPSULE   Yes No   Sig: Take 25 mg by mouth 2 times daily At 1000 and 2200   amLODIPine (NORVASC) 5 MG tablet   Yes No   Sig: Take 5 mg by mouth daily   apixaban ANTICOAGULANT (ELIQUIS ANTICOAGULANT) 2.5 MG tablet   No No   Sig: Take 1 tablet (2.5 mg) by mouth 2 times daily   azaTHIOprine (IMURAN) 50 MG tablet   No No   Sig: Take 1 tablet (50 mg) by mouth daily   furosemide (LASIX) 20 MG tablet   Yes No   Sig: Take 20 mg by mouth daily   metoprolol succinate ER (TOPROL-XL) 100 MG 24 hr tablet   Yes No   Sig: Take 100 mg by mouth 2 times daily      Facility-Administered Medications: None     Allergies   No Known Allergies    Physical Exam   Vital Signs: Temp: 97.6  F (36.4  C) Temp src: Oral BP: 131/58 Pulse: 72   Resp: 18 SpO2: 94 % O2 Device: None (Room air)    Weight: 0 lbs 0 oz    Constitutional: awake, alert and cooperative  Eyes: pupils equal, round and reactive to light and conjunctiva normal  ENT: normocepalic, without obvious abnormality, atramatic  Respiratory: no increased work of breathing, good air exchange and clear to auscultation  Cardiovascular: regular rate and rhythm, murmur present and no edema  GI: normal bowel sounds, non-distended and non-tender  Skin: some bleeding from L arm, no bruising present  Neurologic: alert, oriented x4, weakness to L leg due to pain    Data   Data reviewed today: I reviewed all medications, new labs and imaging results over the last 24 hours.    No lab results found in last 7  days.    Most Recent 3 CBC's:Recent Labs   Lab Test 09/19/21  0656 09/18/21  0150 09/17/21  2124   WBC 6.5 8.1 8.3   HGB 11.9 12.3 12.6   MCV 94 97 94    239 245     Most Recent 3 BMP's:Recent Labs   Lab Test 09/19/21  0656 09/18/21  0734 09/17/21  2124    137 135   POTASSIUM 3.7 3.5 3.7   CHLORIDE 104 104 100   CO2 25 26 28   BUN 18 18 20   CR 1.51* 1.42* 1.52*   ANIONGAP 7 7 7   LETI 9.0 8.7 9.1   GLC 91 93 109*     Recent Results (from the past 24 hour(s))   Head CT w/o contrast    Narrative    CT SCAN OF THE HEAD WITHOUT CONTRAST  4/20/2022 1:17 PM     HISTORY: Fall. Head trauma. Pain. Headache.    TECHNIQUE:  Axial images of the head and coronal reformations without  IV contrast material. Radiation dose for this scan was reduced using  automated exposure control, adjustment of the mA and/or kV according  to patient size, or iterative reconstruction technique.    COMPARISON: None.    FINDINGS: There is no evidence of intracranial hemorrhage, mass, acute  infarct or anomaly. The ventricles are normal in size, shape and  configuration. Mild patchy periventricular white matter hypodensities  which are nonspecific, but likely related to chronic microvascular  ischemic disease. 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    The visualized portions of the sinuses and mastoids appear normal. The  bony calvarium and bones of the skull base appear intact.       Impression    IMPRESSION:     1. No evidence of acute intracranial hemorrhage, mass, or herniation.  2. Mild nonspecific white matter changes likely due to chronic  microvascular ischemic disease.   3. 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    RASHAUN SUMMERS MD         SYSTEM ID:  [id number]   XR Pelvis and Hip Left 1 View    Narrative    PELVIS AND LEFT HIP, ONE VIEW   4/20/2022 1:30 PM     HISTORY: Left hip pain.    COMPARISON: None.      Impression    IMPRESSION: Nondisplaced left intertrochanteric fracture extending to  the  base of the femoral neck.    Mild to moderate bilateral hip degenerative changes. Probable  benign-appearing chronic calcification in the right pelvis.    JOSE VARELA MD         SYSTEM ID:  [id number]

## 2022-04-20 NOTE — ED NOTES
[facility]  ED Nurse Handoff Report    Cande Baird is a 86 year old female   ED Chief complaint: Fall (Hip pain)  . ED Diagnosis:   Final diagnoses:   Closed intertrochanteric fracture of hip, left, initial encounter (H)   History of kidney transplant   Atrial fibrillation, unspecified type (H)   Anticoagulated     Allergies: No Known Allergies    Code Status: DNR / DNI  Activity level - Baseline/Home:  Independent. Activity Level - Current:   Total Care. Lift room needed: Yes. Bariatric: No   Needed: No   Isolation: No. Infection: Not Applicable.     Vital Signs:   Vitals:    04/20/22 1152 04/20/22 1415   BP: (!) 143/75 131/58   Pulse: 71 72   Resp: 18    Temp: 97.6  F (36.4  C)    TempSrc: Oral    SpO2: 95% 94%       Cardiac Rhythm:  ,      Pain level:    Patient confused: No. Patient Falls Risk: Yes.   Elimination Status: Has voided. Purewick in place.   Patient Report - Initial Complaint: Patient presents with a fall and left hip pain. Went to sit on edge of bed and slipped off and landed on left elbow and left hip. Did not hit head or LOC. On eliquis for hx afib. Sustained skin tear to left elbow. +CMS in LLE.   Focused Assessment: Musculoskeletal - Musculoskeletal WDL: .WDL except; all  Extremity Movement: LLE  General Mobility: generalized weakness; moderately impaired  Pain Body Location: hip  Left Joint Swelling: hip  Left Joint Tenderness: hip  LLE Extremity Movement: active ROM moderately impaired  RLE Extremity Movement: active ROM mildly impaired  CMS Intact: Yes  Musculoskeletal Comment: Patient went to sit down on the edge of bed and slipped off. Sustained abrasion to left elbow.   Skin WDL - Skin WDL: .WDL except; all  Procedural focused assessment (identify areas inspected) : Elbow, left  Skin Temperature: cool  Skin Moisture: dry; flaky  Skin Integrity: rub/scrape  Abrasion / Excoriation: Left; Elbow  Skin Comments: Patient sustained abrasion on left elbow when she slid on  the floor.  Tests Performed: XR pelvis, Head CT, COVID  Abnormal Results:   XR Pelvis and Hip Left 1 View   Final Result   IMPRESSION: Nondisplaced left intertrochanteric fracture extending to   the base of the femoral neck.      Mild to moderate bilateral hip degenerative changes. Probable   benign-appearing chronic calcification in the right pelvis.      JOSE VARELA MD            SYSTEM ID:  [id number]      Head CT w/o contrast   Final Result   IMPRESSION:      1. No evidence of acute intracranial hemorrhage, mass, or herniation.   2. Mild nonspecific white matter changes likely due to chronic   microvascular ischemic disease.    3. 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      RASHAUN SUMMERS MD            SYSTEM ID:  [id number]          Treatments provided: APAP  Family Comments: Daughter at bedside  OBS brochure/video discussed/provided to patient:  N/A  ED Medications:   Medications   Vitamin D3 (CHOLECALCIFEROL) tablet 50 mcg (has no administration in time range)   acetaminophen (TYLENOL) tablet 975 mg (650 mg Oral Given 4/20/22 1248)     Drips infusing:  No  For the majority of the shift, the patient's behavior Green. Interventions performed were N/A.    Sepsis treatment initiated: No     Patient tested for COVID 19 prior to admission: YES    ED Nurse Name/Phone Number: Ann Yo RN,   2:23 PM    RECEIVING UNIT ED HANDOFF REVIEW    Above ED Nurse Handoff Report was reviewed: Yes  Reviewed by: Jaime Brantley RN on April 20, 2022 at 4:18 PM

## 2022-04-20 NOTE — ED TRIAGE NOTES
Patient presents with a fall and left hip pain. Went to sit on edge of bed and slipped off and landed on left elbow and left hip. Did not hit head or LOC. On eliquis for hx afib. Sustained skin tear to left elbow. +CMS in LLE.

## 2022-04-20 NOTE — LETTER
BRIANA AndradeW, LSW  Inpatient Care Coordination  Ortho Unit, [facility]  [phone]

## 2022-04-20 NOTE — ED NOTES
Bed: ED38  Expected date:   Expected time:   Means of arrival: Ambulance  Comments:  Carolina Felipe

## 2022-04-20 NOTE — TREATMENT PLAN
Orthopedic Surgery    Left hip intertrochanteric fracture    Plan:   Patient scheduled for a Left hip IM Nail tomorrow pending patient is optimized/cleared for surgery per hospitalist.    Surgeon: Dr Reveles  NPO Status effective midnight  NWB/Bedrest until postop  Vit D ordered   Hold anticoagulants if taken: Eliquis  Pain medication as needed, minimize narcotics as able

## 2022-04-20 NOTE — LETTER
BRIANA AndradeW, LSW  Inpatient Care Coordination  Ortho Unit, [facility]  [phone]

## 2022-04-21 NOTE — PROGRESS NOTES
[facility]    Medicine Progress Note - Hospitalist Service    Date of Admission:  4/20/2022    Assessment & Plan          Cande Baird is a 86 year old female w/hx afib on chronic anticoagulation, CKD stage 3b s/o kidney transplant on immunosuppression, HTN who presents after fall and found to have L hip fracture.    1.  Left femur fracture.  Secondary to a fall from bed.  Does feel the pain is under better control with pain medications.  Has been seen in consultation by orthopedic surgery.    -Planning for repair in operating room on 4/22.  -Pain medications as needed.  -Use incentive spirometry.    2.  Paroxysmal atrial fibrillation.  Normally on metoprolol extended release and apixaban.  -Hold apixaban for planned surgery.    -Restart after surgery when okay with orthopedic surgery.  -Hold metoprolol due to episodes of bradycardia and second-degree heart block type II.    3.  Second-degree heart block Mobitz type II.  Noted on telemetry during hospital stay.  Previously on metoprolol for atrial fibrillation.  -Appreciate cardiology input.  -Continue to monitor on telemetry.  -Continue to hold metoprolol.    4.  Previous kidney transplant.  -Continue cyclosporine 25 mg twice a day.  -Continue azathioprine 50 mg a day.    5.  Hypertension.  -Continue amlodipine 5 mg a day.    6.  Acute kidney injury on chronic kidney disease stage IIIb.  Baseline creatinine appears to be around 1.5.  Creatinine at time of presentation 3.3.  Has improved mildly to 2.7 on 4/21.  -Continue IV fluids.  -Avoid nephrotoxins as able.  -Recheck metabolic panel tomorrow.  -Continue to hold furosemide.    7.  Acute anemia.    Suspect acute blood loss due to femur fracture.  Hemoglobin 10 at time of presentation.  Has drifted down to 8.2 on recheck today.  -Recheck CBC tomorrow.  -Continue to hold apixaban.    8.  Left upper extremity swelling.  Suspect this is an ecchymosis from fall.  -Hold apixaban.  -Check left upper  extremity Doppler ultrasound.         Diet: NPO per Anesthesia Guidelines for Procedure/Surgery Except for: Meds  NPO per Anesthesia Guidelines for Procedure/Surgery Except for: Meds    DVT Prophylaxis: Pneumatic Compression Devices  Benjamin Catheter: Not present  Central Lines: None  Cardiac Monitoring: ACTIVE order. Indication: hx afib  Code Status: Full Code        Alfa Horner DO  Hospitalist Service  [facility]  Securely message with the Vocera Web Console (learn more here)  Text page via [employer] Paging/Directory         Clinically Significant Risk Factors Present on Admission               # Coagulation Defect: home medication list includes an anticoagulant medication        ______________________________________________________________________    Interval History   Continues having left hip pain.  Occasional nausea.  Denies chest pain, shortness of breath, fevers, chills, or vomiting.    Data reviewed today: I reviewed all medications, new labs and imaging results over the last 24 hours.     Physical Exam   Vital Signs: Temp: 97.5  F (36.4  C) Temp src: Oral BP: 126/61 Pulse: 80   Resp: 18 SpO2: 93 % O2 Device: None (Room air)    Weight: 0 lbs 0 oz  ;Gen:  NAD, A&Ox3.  Eyes:  PERRL, sclera anicteric.  OP:  MMM, no lesions.  Neck:  Supple.  CV: Mildly irregular, +1/6 murmur.  Lung:  CTA b/l, normal effort.  Ab:  +BS, soft.  Skin:  Warm, dry to touch.  No rash.  Ext:  No pitting edema LE b/l.  Left upper extremity significantly swollen compared to right.      Data   Recent Labs   Lab 04/21/22  0800 04/20/22  2131 04/20/22  1416   WBC 13.5* 14.6* 13.5*   HGB 8.2* 10.0* 10.0*   * 98 97    295 295   INR  --   --  1.41*     --  133   POTASSIUM 3.7  --  3.9   CHLORIDE 112*  --  103   CO2 19*  --  23   BUN 44*  --  53*   CR 2.71*  --  3.31*   ANIONGAP 7  --  7   LETI 7.5*  --  8.9   GLC 95  --  108*

## 2022-04-21 NOTE — PROGRESS NOTES
I was called for 2.2-second pause.  Heart rate is now 72.  Asymptomatic.  I added magnesium and phosphorus to morning labs.  Continue to monitor.

## 2022-04-21 NOTE — PLAN OF CARE
VSS. A/Ox4. Pt had significant bleeding from left upper extremity. Went through pressure dressing and reinforcement. Provider paged to come assess ASAP as pt was symptomatic. Provider did not visually assess wound as pressure dressing was on but thought pts skin would not tolerate a stitch or intervention. Applied another pressure dressing as it bled through again. This dressing remains intact and has not bled through. Recheck hemoglobin in AM. Tele SR and going in and out of 2nd degree AV block. NPO for surgery. Strict bedrest. PCDs in place. Plan to be assessed by cardiology before having surgery.     BP (!) 140/65 (BP Location: Right arm, Patient Position: Semi-Keene's)   Pulse 74   Temp 98.1  F (36.7  C) (Oral)   Resp 20   SpO2 92%

## 2022-04-21 NOTE — PROGRESS NOTES
Cross cover notified of patient with ongoing blood loss from a left arm skin tear following her fall.  She reported feeling lightheaded.  Evaluated the patient at bedside.  A compression dressing had already been replaced.  The previous dressing had blood coming out both sides of it.  She is on Eliquis with last dose in the morning.  She has very thin skin and it is unlikely that any sort of suture repair would be any more effective than a compression dressing and time for Eliquis to wear off.  She is actually admitted for left hip fracture that will eventually need surgical intervention, again once Eliquis at that time to wear off.  Her hemoglobin is 10.0 so I doubt her lightheadedness is secondary to this and may actually be due to the noted Mobitz type II second-degree heart block on telemetry this evening and around that time she did have a few missed beats although not consecutive.  Additionally she had a short run of PSVT.  Her blood pressure is currently 150 systolic and she is not tachycardic.  -Continue compression dressings overnight.  There is slight swelling and slight purple discoloration involving the hand, but she has a very good radial pulse and denies numbness  -Recheck hemoglobin with morning lab draw  -Continue telemetry  -Metoprolol and Eliquis already held  -Vitally stable and hemoglobin is 10 so I do not see a role for Kcentra or other attempt of a reversal agent for Eliquis

## 2022-04-21 NOTE — PROVIDER NOTIFICATION
Patient transferred from Ortho unit last night. Overnight provider diagnosed patient new Mobitz type 11 second-degree heart block on telemetry. This morning, patient noted briefly with heart rate (36-38) but mostly in the 70's- 80's.       Of note, patient also is nauseous. Consulted with pharmacy prior to giving antiemetic. Noted last 12 lead EKG on file  (09/30/2021). Spoke with Dr. Denny, plan to obtain 12 Lead EKG now.       Jasmin Guzmán RN on 4/21/2022 at 11:48 AM

## 2022-04-21 NOTE — PROGRESS NOTES
I was called for new Mobitz type II second-degree heart block noted on telemetry.  I reviewed rhythm strips and agree.  Patient was on metoprolol 100 mg twice daily which I stopped.  This arrhythmia could be due to beta-blocker.  It also, however, has the potential to progress to complete heart block.  Patient is in room 601.  I have put in an order for her to be transferred to the third floor and be monitored with a Zoll patch nearby.  I have requested cardiology consultation.  There was a tentative plan for her to have a hip replacement tomorrow but cardiology should weigh in on this before that happens.

## 2022-04-21 NOTE — CONSULTS
[facility]  Cardiology Consultation     Date of Admission:  4/20/2022    Primary Care Physician   [facility]    Reason for Consult   Reason for consult: I was asked to evaluate this patient for second-degree heart block in a patient with a fractured hip    History of Present Illness   Cande Baird is a 86 year old woman who follows with my partner Dr. Marilyn Reynaga.  She has a past medical history significant for hypertension, chronic renal disease status post kidney transplant 1997, chronic congestive heart failure with preserved left ventricular function, and paroxysmal atrial fibrillation.    Paroxysmal A.  Fib was first identified on 5 days Zio Patch in August 2021.  She has been treated with high-dose metoprolol and dose adjusted Eliquis.  Work-up included a normal Lexiscan in October 2021 most recent echocardiogram was done on this hospital stay with ejection fraction of >70% with moderate tricuspid regurgitation.  Mildly dilated ascending aorta 4.1 cm.  Pulmonary pressures estimated to be 37 mmHg plus the right atrial pressure.  IVC could not be clearly seen.    Patient relates she had a fall as she just missed the bed when she was sitting down.  She denies having any lightheadedness dizziness associated with her fall and hip fracture.  She does relate that she has occasional lightheadedness.  She has no syncopal spells.  She has no chest arm neck jaw or shoulder discomfort.  No dyspnea on exertion orthopnea or PND.  The day of her fall she was under duress as her  had just been admitted to the hospital after an arrest at home.  She is gathering close to bring to him and states she was worked up.    Her home medications include Toprol 100 mg twice daily and Eliquis 2.5 mg twice daily.  She is also on amlodipine for blood pressure.   furosemide for her history of chronic congestive heart failure with preserved left ventricular function.    On admission creatinine was  up to 3.3 baseline is 1.5.  This gave her a GFR of 17.  Hemoglobin tends to run 11.9 and is now dropped to 8.2.  Twelve-lead EKG demonstrates a normal sinus rhythm with left atrial enlargement.  Telemetry demonstrates brief episodes of atrial fibrillation with high-grade block.  And episodes of 2-1 block.  Patient is currently in normal sinus rhythm.    Both parents  in her 80s she does not know if that cardiac issues.  She quit smoking in her 40s prior to that smoking 1 pack/day.  She does not have diabetes mellitus or hypertension.  Most recent fasting lipid profile I find from 2017 showed a total cholesterol 220 with an HDL of 79 and LDL of 113.      Assessment & Plan   Cande Baird is a 86 year old female who was admitted on 2022 with a hip fracture.  She has had a bump in her creatinine which is going to affect her Eliquis metabolism.  Eliquis has been held since admission.  Normally it would clear in 48 hours however with her renal dysfunction this will take longer.  I will ask pharmacy if they have any nomogram to calculate when it would be safe to proceed with hip surgery.    2-1 heart block in the setting of high-dose beta-blockade.  Beta-blockers have been held.  We will observe on telemetry.  If she continues to have intermittent heart block she may have sick sinus syndrome and require pacemaker.  At this time I think just stopping her beta-blocker should be adequate.    Acute on chronic renal failure    Acute on chronic anemia probably secondary to blood loss from hip fracture complicated by Eliquis therapy.    Eventually restart Eliquis at 2.5 mg twice daily for paroxysmal atrial fibrillation when patient is stable.    She has no history of coronary disease with a negative Lexiscan from last fall.  She has no chest pain syndrome.  I do not think we need to evaluate this any further.    From a cardiac standpoint I suspect she will be okay for surgery.  Timing depends on clearing of  Eliquis.    Chronic diastolic heart failure appears to be very well compensated.    Clinically Significant Risk Factors Present on Admission              # Coagulation Defect: home medication list includes an anticoagulant medication      Cardiovascular: Cardiac Arrhythmia: Atrial fibrillation: Paroxysmal  Bradycardia, unspecified  Sick sinus syndrome                Nephrology: Acute kidney failure, unspecified  CKD POA List: Stage 3b (GFR 30-44)                   Kingsley Denny MD    Patient Active Problem List   Diagnosis     Long-term use of immunosuppressant medication     Immunosuppressed status (H)     S/P kidney transplant     Arthritis     Secondary hyperparathyroidism of renal origin (H)      Advance Care Planning     Anxiety     Pyelonephritis     Generalized weakness     Atrial fibrillation with RVR (H)     Anticoagulated     History of kidney transplant     Closed nondisplaced intertrochanteric fracture of left femur, initial encounter (H)     Closed intertrochanteric fracture of hip, left, initial encounter (H)     Atrial fibrillation, unspecified type (H)       Past Medical History   I have reviewed this patient's medical history and updated it with pertinent information if needed.   Past Medical History:   Diagnosis Date     Arthritis      Atrial fibrillation with RVR (H) 09/17/2021     Benign essential hypertension      Glomerulonephritis      Renal transplant recipient 1997     Skin cancer     does not see dermatologist     Stage 3b chronic kidney disease (H)        Past Surgical History   I have reviewed this patient's surgical history and updated it with pertinent information if needed.  Past Surgical History:   Procedure Laterality Date     BIOPSY  6 months    Squeamish cell-removed     COLONOSCOPY  5/15/2013     LAPAROSCOPIC CYSTECTOMY OVARIAN (BENIGN)       TRANSPLANT  October 17, 1987    Kidney       Prior to Admission Medications   Prior to Admission Medications   Prescriptions Last  Dose Informant Patient Reported? Taking?   GENGRAF (BRAND) 25 MG CAPSULE 4/20/2022 at am  Yes Yes   Sig: Take 25 mg by mouth 2 times daily   amLODIPine (NORVASC) 5 MG tablet 4/20/2022 at Unknown time  Yes Yes   Sig: Take 5 mg by mouth daily   apixaban ANTICOAGULANT (ELIQUIS ANTICOAGULANT) 2.5 MG tablet 4/20/2022 at am  No Yes   Sig: Take 1 tablet (2.5 mg) by mouth 2 times daily   azaTHIOprine (IMURAN) 50 MG tablet 4/19/2022 at Unknown time  Yes Yes   Sig: Take 50 mg by mouth At Bedtime   furosemide (LASIX) 20 MG tablet 4/20/2022 at Unknown time  Yes Yes   Sig: Take 20 mg by mouth daily   metoprolol succinate ER (TOPROL-XL) 100 MG 24 hr tablet 4/20/2022 at am  Yes Yes   Sig: Take 100 mg by mouth 2 times daily      Facility-Administered Medications: None     Current Facility-Administered Medications   Medication Dose Route Frequency     acetaminophen  975 mg Oral Q8H     amLODIPine  5 mg Oral Daily     azaTHIOprine  50 mg Oral At Bedtime     cycloSPORINE modified  25 mg Oral BID     docusate sodium  100 mg Oral BID     sodium chloride (PF)  3 mL Intracatheter Q8H     cholecalciferol  50 mcg Oral Daily     Current Facility-Administered Medications   Medication Last Rate     sodium chloride 75 mL/hr at 04/21/22 0439     Allergies   No Known Allergies    Social History    reports that she quit smoking about 33 years ago. Her smoking use included cigarettes. She smoked 0.00 packs per day for 0.00 years. She quit smokeless tobacco use about 34 years ago. She reports current alcohol use. She reports that she does not use drugs.    Family History   Family History   Problem Relation Age of Onset     Diabetes Mother 60     Peptic Ulcer Disease Father      Diabetes Sister      Rheumatoid Arthritis Sister      Diabetes Sister        Review of Systems   The comprehensive 10 point Review of Systems is negative other than noted in the HPI or here.     Physical Exam   Vital Signs with Ranges  Temp:  [97  F (36.1  C)-98.1  F (36.7   C)] 97.5  F (36.4  C)  Pulse:  [61-80] 80  Resp:  [18-20] 18  BP: (122-157)/(56-79) 126/61  SpO2:  [91 %-95 %] 93 %  Wt Readings from Last 4 Encounters:   04/08/22 54 kg (119 lb)   11/04/21 54.7 kg (120 lb 9.6 oz)   10/27/21 53.7 kg (118 lb 6.4 oz)   09/30/21 55.1 kg (121 lb 8 oz)     No intake/output data recorded.      Vitals: /61 (BP Location: Right arm, Patient Position: Semi-Keene's)   Pulse 80   Temp 97.5  F (36.4  C) (Oral)   Resp 18   SpO2 93%     Patient is comfortable, sleepy, in no apparent distress. Awake, alert and oriented ×3  Pupils are equal round and reactive.  Sclerae anicteric conjunctiva is noninjected  Neck is supple there are no carotid bruits or jugular venous distention.  Chest is clear to auscultation.  There is no kyphosis or scoliosis  Cardiac exam reveals a regular rate and rhythm she has a 2/6 holosystolic  murmur.  Abdomen is soft nontender with normoactive bowel sounds.  Extremities are without edema.  There are 2+ pulses.  Neurologic exam is nonfocal.  Sleepy  Skin is warm and dry.      No lab results found in last 7 days.    Invalid input(s): TROPONINIES    Recent Labs   Lab 04/21/22  0800 04/20/22  2131 04/20/22  1416   WBC 13.5* 14.6* 13.5*   HGB 8.2* 10.0* 10.0*   * 98 97    295 295   INR  --   --  1.41*     --  133   POTASSIUM 3.7  --  3.9   CHLORIDE 112*  --  103   CO2 19*  --  23   BUN 44*  --  53*   CR 2.71*  --  3.31*   GFRESTIMATED 17*  --  13*   ANIONGAP 7  --  7   LETI 7.5*  --  8.9   GLC 95  --  108*     Recent Labs   Lab Test 08/15/17  1047 05/26/17  1043   CHOL 220* 203*   HDL 79 69   * 108*   TRIG 139 131     Recent Labs   Lab 04/21/22  0800 04/20/22  2131 04/20/22  1416   WBC 13.5* 14.6* 13.5*   HGB 8.2* 10.0* 10.0*   HCT 27.2* 31.2* 31.4*   * 98 97    295 295     No results for input(s): PH, PHV, PO2, PO2V, SAT, PCO2, PCO2V, HCO3, HCO3V in the last 168 hours.  No results for input(s): NTBNPI, NTBNP in the last 168  hours.  No results for input(s): DD in the last 168 hours.  No results for input(s): SED, CRP in the last 168 hours.  Recent Labs   Lab 04/21/22  0800 04/20/22  2131 04/20/22  1416    295 295     No results for input(s): TSH in the last 168 hours.  No results for input(s): COLOR, APPEARANCE, URINEGLC, URINEBILI, URINEKETONE, SG, UBLD, URINEPH, PROTEIN, UROBILINOGEN, NITRITE, LEUKEST, RBCU, WBCU in the last 168 hours.    Imaging:  Recent Results (from the past 48 hour(s))   Head CT w/o contrast    Narrative    CT SCAN OF THE HEAD WITHOUT CONTRAST  4/20/2022 1:17 PM     HISTORY: Fall. Head trauma. Pain. Headache.    TECHNIQUE:  Axial images of the head and coronal reformations without  IV contrast material. Radiation dose for this scan was reduced using  automated exposure control, adjustment of the mA and/or kV according  to patient size, or iterative reconstruction technique.    COMPARISON: None.    FINDINGS: There is no evidence of intracranial hemorrhage, mass, acute  infarct or anomaly. The ventricles are normal in size, shape and  configuration. Mild patchy periventricular white matter hypodensities  which are nonspecific, but likely related to chronic microvascular  ischemic disease. 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    The visualized portions of the sinuses and mastoids appear normal. The  bony calvarium and bones of the skull base appear intact.       Impression    IMPRESSION:     1. No evidence of acute intracranial hemorrhage, mass, or herniation.  2. Mild nonspecific white matter changes likely due to chronic  microvascular ischemic disease.   3. 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    RASHAUN SUMMERS MD         SYSTEM ID:  [id number]   XR Pelvis and Hip Left 1 View    Narrative    PELVIS AND LEFT HIP, ONE VIEW   4/20/2022 1:30 PM     HISTORY: Left hip pain.    COMPARISON: None.      Impression    IMPRESSION: Nondisplaced left intertrochanteric fracture extending  to  the base of the femoral neck.    Mild to moderate bilateral hip degenerative changes. Probable  benign-appearing chronic calcification in the right pelvis.    JOSE VARELA MD         SYSTEM ID:  [id number]   Echocardiogram Complete   Result Value    LVEF  >70%    [facility]    [id number]  [id number]  [id number]  [id number]^NADYA^HARLEEN^DELFINA     [facility]  Echocardiography Laboratory  [address] [place] [address]     Name: SHAWN STEVEN  MRN: [id number]  : 1935  Study Date: 2022 08:27 AM  Age: 86 yrs  Gender: Female  Patient Location: [facility]  Reason For Study: Murmur  Ordering Physician: HARLEEN COVINGTON  Performed By: Dolores Lehman     BSA: 1.6 m2  Height: 63 in  Weight: 119 lb  BP: 144/72 mmHg  ______________________________________________________________________________  Procedure  Complete Portable Echo Adult.  ______________________________________________________________________________  Interpretation Summary     Hyperdynamic left ventricular function  The visual ejection fraction is >70%.  No regional wall motion abnormalities noted.  There is moderate (2+) tricuspid regurgitation.  Right ventricular systolic pressure is elevated, consistent with mild  pulmonary hypertension.  The ascending aorta is Mildly dilated. (4.1cm)  Compared to , degree of TR is marginally worse and mild pulmonary  hypertension is now seen. The study was technically difficult.  ______________________________________________________________________________  Left Ventricle  The left ventricle is normal in size. There is normal left ventricular wall  thickness. Hyperdynamic left ventricular function. The visual ejection  fraction is >70%. Diastolic Doppler findings (E/E' ratio and/or other  parameters) suggest left ventricular filling pressures are indeterminate.  Grade II or moderate diastolic dysfunction. No regional wall motion  abnormalities noted.     Right Ventricle  The right ventricle is  normal size. The right ventricular systolic function is  normal.     Atria  Normal left atrial size. Right atrial size is normal.     Mitral Valve  There is trace mitral regurgitation.     Tricuspid Valve  There is moderate (2+) tricuspid regurgitation. The right ventricular systolic  pressure is approximated at 37.0 mmHg plus the right atrial pressure. Right  ventricular systolic pressure is elevated, consistent with mild pulmonary  hypertension.     Aortic Valve  There is mild trileaflet aortic sclerosis. There is trace to mild aortic  regurgitation. No aortic stenosis is present.     Pulmonic Valve  There is trace to mild pulmonic valvular regurgitation.     Vessels  The ascending aorta is Mildly dilated. (4.1cm). Inferior vena cava not well  visualized for estimation of right atrial pressure.     Pericardium  Trivial pericardial effusion.     Rhythm  Sinus rhythm was noted.  ______________________________________________________________________________  MMode/2D Measurements & Calculations  IVSd: 0.93 cm  LVIDd: 3.5 cm  LVIDs: 2.0 cm  LVPWd: 0.89 cm  FS: 43.0 %  LV mass(C)d: 90.4 grams  LV mass(C)dI: 58.3 grams/m2  Ao root diam: 3.1 cm  asc Aorta Diam: 4.1 cm  LVOT diam: 2.0 cm  LVOT area: 3.1 cm2  RWT: 0.51     Doppler Measurements & Calculations  MV E max jean: 68.0 cm/sec  MV A max jean: 80.3 cm/sec  MV E/A: 0.85  MV dec slope: 283.0 cm/sec2  MV dec time: 0.24 sec  Ao V2 max: 197.1 cm/sec  Ao max PG: 15.5 mmHg  Ao V2 mean: 137.0 cm/sec  Ao mean P.2 mmHg  Ao V2 VTI: 40.0 cm  BENSON(I,D): 2.0 cm2  BENSON(V,D): 1.9 cm2  LV V1 max P.7 mmHg  LV V1 max: 119.0 cm/sec  LV V1 VTI: 25.2 cm  SV(LVOT): 79.2 ml  SI(LVOT): 51.1 ml/m2     PA acc time: 0.10 sec  TR max jean: 304.0 cm/sec  TR max P.0 mmHg  AV Jean Ratio (DI): 0.60  BENSON Index (cm2/m2): 1.3  E/E' av.7  Lateral E/e': 10.8  Medial E/e': 12.6     ______________________________________________________________________________  Report approved by: Ricki  Lucas Eric 04/21/2022 10:10 AM             Echo:  No results found for this or any previous visit (from the past 4320 hour(s)).

## 2022-04-21 NOTE — PROGRESS NOTES
Patient's daughter, Juanita, notified of room change.    Telemetry notified of room change to 320.

## 2022-04-21 NOTE — PROGRESS NOTES
Cande admitted at 1645. Rated pain 10/10. IV dilauded given. Hypertensive, but other VSS. Bleeding from skin tear on left forearm proximal to elbow. Pressure applied for 10 minutes and dressing changed. Linen's changed. Arm elevated and ice applied. Absorbing pad placed underneath arm. CBC checked. Hemoglobin unchanged.    At 2000. Tele tech notified RN of 2 second pause and HR in the 30s. MD paged At 2030, tele tech notified RN of 2nd degree dahlia. Doctor paged. Ordered patient to transfer to [email or web address] with zoll patch. Patient had emesis right before and after transfer. Declined intervention.

## 2022-04-21 NOTE — PROGRESS NOTES
"End of Shift Summary Note  86-y/o female admitted on 04/20/22 for evaluation of fall and found to have left hip fracture. Ortho consulted. Patient transferred from ortho unit overnight due to rhythm changes seen on telemetry. Cards consulted. H/o paroxysmal A. Fib, on beta blocker and Eliquis. Hx of kidney transplant.       Pertinent Assessment  Alert and oriented x 4, lethargic, sleeping in between cares. Vital signs stable, afebrile, on room air. Denies pain at rest. Pain with movement, on scheduled tylenol, PRN oxycodone given x 1. Resumed regular diet.     Reports nausea at baseline, declined antiemetic, states \"they don't work for me\".  Bradycardic, HR noted as low as 37 to 38 briefly, and recovers to 70's to 80's sinus rhythm.     Of note, patient sustained left arm skin tear following her fall. Overnight, she was noted bleeding and required compression dressing to her left upper arm. Dr. Horner order US of left arm. Strict bedrest, turn and reposition.     Plan: Left femur fracture. Ortho consulted for possible surgery tomorrow. Cardiology cleared patient for surgery tomorrow. Cont to hold Metoprolol and Eliquis. NPO after midnight.     Vital signs:  Temp: 98  F (36.7  C) Temp src: Oral BP: 114/56 Pulse: 78   Resp: 18 SpO2: 96 % O2 Device: None (Room air)        Estimated body mass index is 21.08 kg/m  as calculated from the following:    Height as of 4/8/22: 1.6 m (5' 3\").    Weight as of 4/8/22: 54 kg (119 lb).    Jasmin Guzmán RN on 4/21/2022 at 5:36 PM      "

## 2022-04-21 NOTE — PROGRESS NOTES
Ortho treatment plan    Patient with left intertrochanteric femur fracture  Needs cardiology workup    Tentative plan for IM nail left femur tomorrow pending workup results    NPO after midnight    Kylah FLYNN

## 2022-04-22 NOTE — PLAN OF CARE
Goal Outcome Evaluation:    Plan of Care Reviewed With: patient     Overall Patient Progress: improving    Outcome Evaluation: able to stand @ bedside w/ Ax2.    Report received from Jasmin BOYLE. Pt arrived to room 623 at 1400 via bed with belongings. Oriented to room and call light.     Patient vital signs are at baseline: No,  Reason:  Pt requiring 2L O2. Capnography monitoring.  Patient able to ambulate as they were prior to admission or with assist devices provided by therapies during their stay:  No,  Reason:  Pt able to stand @ bedside w/ Ax2, using gait belt, and walker. Pt complained of pain and light headedness, so put back to bed. VS remained stable.  Patient MUST void prior to discharge:  No,  Reason:  Pt DTV-bladder scanned for 173 ml. Purewick in place for incontinence.  Patient able to tolerate oral intake:  Yes  Pain has adequate pain control using Oral analgesics:  Yes-PO tramadol and scheduled tylenol.  Does patient have an identified :  No,  Reason:  pt may need TCU.  Has goal D/C date and time been discussed with patient:  No,  Reason:  TBD.     Pt intermittently confused/forgetful. Alert. CMS intact. Dressing CDI.

## 2022-04-22 NOTE — ANESTHESIA POSTPROCEDURE EVALUATION
Patient: Cande Baird    Procedure: Procedure(s):  Open reduction internal fixation left proximal femur fracture       Anesthesia Type:  General    Note:  Disposition: Inpatient   Postop Pain Control: Uneventful            Sign Out: Well controlled pain   PONV: No   Neuro/Psych: Uneventful            Sign Out: Acceptable/Baseline neuro status   Airway/Respiratory: Uneventful            Sign Out: Acceptable/Baseline resp. status   CV/Hemodynamics: Uneventful            Sign Out: Acceptable CV status; No obvious hypovolemia; No obvious fluid overload   Other NRE: NONE   DID A NON-ROUTINE EVENT OCCUR? No           Last vitals:  Vitals Value Taken Time   BP     Temp     Pulse     Resp     SpO2         Electronically Signed By: Jaime Scott MD  April 22, 2022  8:46 AM

## 2022-04-22 NOTE — PROGRESS NOTES
[facility]  Hospitalist Progress Note  Whitley Veliz MD 04/22/22    Reason for Stay (Diagnosis): fall, hip fracture         Assessment and Plan:      Summary of Stay: Cande Baird is a 86 year old female with past medical history of afib on chronic anticoagulation, CKD stage 3b s/p kidney transplant on immunosuppression, HTN who resides in a senior living facility who was admitted on 4/20/2022 after accidentally falling off of her bed and landing on her side and was found to have left intertrochanteric hip fracture.  Patient underwent surgical repair today.    Problem List/Assessment and Plan:     Mechanical fall with left intertrochanteric hip fracture status postsurgical repair on 4/22: Patient fell when she was sitting on her bed (mechanical in nature).  Found to have intertrochanteric fracture which was repaired by orthopedics today.  -Orthopedics consulted, appreciate recommendations  -Pain control with tramadol, Tylenol or IV Dilaudid for severe pain  -PT/OT/social work consults  -We will plan to resume Eliquis once okay from a surgical standpoint    Paroxysmal atrial fibrillation and chronic diastolic CHF: Patient follows with Dr. Reynaga.  Patient had a Gisel scan in 10/2021 without any ischemia.  She is on Toprol 100 mg twice daily as well as Eliquis 2.5 mg twice daily for rate control and prophylaxis as well as amlodipine and Lasix.  Telemetry has shown episodes of atrial fibrillation with high-grade block and episodes of 2-1 block.  Cardiology was consulted in the preoperative setting.  It was recommended to hold her beta-blocker given 2-1 heart block and observe on telemetry.  Should she not improve consideration would be need to be given to possible sick sinus syndrome and PPM.  -Cardiology consulted, appreciate recommendations  -Continue to hold beta-blocker    Acute kidney injury with CKD stage IIIb and prior kidney transplant on immunosuppression: Creatinine was 2.6 in 1/2022, 3 in 2/2022  and 3.1 on 4/5/2022.  Her recent baseline seems to be about 3.  She follows with [facility] nephrology.  She was last seen in nephrology on 2/24/2022.  At that time she was found to have a UTI and treated with Keflex.  She was noticing lower extremity edema and was started on Lasix 20 mg daily earlier this month.  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  Creatinine on admission was 3.31, today has increased slightly to 3.66.  -Hold Lasix as she is quite sedated after surgery and I do not expect she will be eating much today  -Continue PTA immunosuppressive regimen including azathioprine 50 mg nightly and cyclosporine 25 mg twice daily  -BMP in the morning  -Avoid nephrotoxic agents     Acute on chronic anemia: Baseline hemoglobin approximately 11.  Hemoglobin was 10 on admission and decreased to 8.4 today.  This is in the setting of acute fracture and surgery.  No indication for transfusion now but if she decreases to 7 or less certainly would require this.  -CBC in the morning    Leukocytosis: WBC elevated 13.5 on admission, has increased to 24 today.  She is afebrile.  In care everywhere I find that she has had recent urinary tract infections.  We will check a UA, she is very sleepy after surgery so I am unable to assess if she is having any urinary symptoms at this time.  -Check UA  -If patient does become febrile would start ceftriaxone for possible UTI and obtain blood cultures  -CBC in the morning    Hypertension: Prior to admission the patient was on metoprolol, Lasix and amlodipine.  As above metoprolol is on hold.  Holding Lasix as well.  -Continue amlodipine    Diet: Advance Diet as Tolerated: Regular Diet Adult    DVT Prophylaxis: PCD, will plan to resume DOAC once ok from surgical standpoint  Benjamin Catheter: Not present  Code Status: Full Code      Disposition Plan   Expected Discharge: 04/25/2022     Anticipated discharge location:  Awaiting care coordination  huddle  Delays:     PT New Consult needed         Entered: Whitley Veliz MD 04/22/2022, 11:15 AM       The patient's care was discussed with the Bedside Nurse, Patient and Patient's Family.    Hospitalist Service  [facility]          Interval History (Subjective):      Patient was seen after surgery.  She is very sleepy from surgery and will only open her eyes but not answer any questions at this time.  Her daughter, Juanita, is at the bedside.  I reviewed the care plan with Juanita and all of her questions were answered.  She had questions about how it is determined how her mother will get to TCU and I explained this process.                  Physical Exam:      Last Vital Signs:  /44 (BP Location: Right arm)   Pulse 86   Temp 97  F (36.1  C) (Oral)   Resp 18   Ht 1.524 m (5')   Wt 57.5 kg (126 lb 12.8 oz)   SpO2 100%   BMI 24.76 kg/m      General: Sleeping, will briefly open eyes but not answer questions  HEENT: Normocephalic and atraumatic, eyes anicteric and without scleral injection, MMM  Cardiac: RRR, normal S1, S2. No m/g/r, 1+ LE edema.  Pulmonary: Normal chest rise, normal work of breathing.  Lungs CTAB without crackles or wheezing.  Abdomen: soft, non-tender, non-distended.  Normoactive bowel sounds, no guarding or rebound tenderness.  Extremities: no deformities.  Warm, well perfused. Dressing over left hip  Skin: no rashes or lesions.  Warm and Dry.  Neuro: Sleeping, briefly opens eyes  Psych: Sleeping         Medications:      All current medications were reviewed with changes reflected in problem list.         Data:      All new lab and imaging data was reviewed.   Labs:  Recent Labs   Lab 04/22/22  0509 04/21/22  0800 04/20/22  1416    138 133   POTASSIUM 4.6 3.7 3.9   CHLORIDE 108 112* 103   CO2 20 19* 23   ANIONGAP 7 7 7   * 95 108*   BUN 56* 44* 53*   CR 3.66* 2.71* 3.31*   GFRESTIMATED 12* 17* 13*   LETI 8.6 7.5* 8.9     Recent Labs   Lab  04/22/22  0509 04/21/22  0800 04/20/22  2131   WBC 24.0* 13.5* 14.6*   HGB 8.4* 8.2* 10.0*   HCT 27.4* 27.2* 31.2*    102* 98    253 295      Imaging:   Recent Results (from the past 24 hour(s))   US Upper Extremity Venous Duplex Left    Narrative    US UPPER EXTREMITY VENOUS DUPLEX LEFT  4/21/2022 3:16 PM     HISTORY: Left arm swelling.    COMPARISON: Left internal jugular vein.    TECHNIQUE: Color Doppler and spectral waveform analysis performed  throughout the deep and superficial veins of the left upper extremity.    FINDINGS: The left internal jugular, subclavian, axillary, and  brachial veins demonstrate normal blood flow, compression (where  applicable), and augmentation. Basilic and cephalic veins are patent.  Radial and ulnar veins are compressible. Subcutaneous edema in the  forearm.      Impression    IMPRESSION: Negative for deep venous thrombosis in the left upper  extremity.    SHANNON PIZARRO MD         SYSTEM ID:  [id number]   XR Surgery MEGAN L/T 5 Min Fluoro w Stills    Narrative    This exam was marked as non-reportable because it will not be read by a   radiologist or a [place] non-radiologist provider.             Whitley Veliz MD

## 2022-04-22 NOTE — OR NURSING
Pre procedure care completed. Pt's daughter was updated. Dr. Scott and Dr. Salmeron saw pt at bedside,aware lab results.  Dr. Salmeron also spoke to daughter regarding surgery,questions were encouraged and answered.

## 2022-04-22 NOTE — PROGRESS NOTES
[facility]    Cardiology Progress Note    Primary Cardiologist: Dr. Reynaga    Date of Admission: 04/20/2022  Service Date: 04/22/2022    Summary:  Ms. Cande Baird is a very pleasant 86 year old female well known to me from the clinic with a past medical history of hypertension, CKD status post kidney transplant in 1997, chronic HFpEF, and paroxysmal atrial fibrillation who was admitted on 4/20/2022 after presenting after a mechanical fall at home and being found to have a left hip fracture. Cardiology was consulted as she was noted to have Mobitz type II second-degree heart block and intermittent bradycardia on telemetry in setting of being on high dose beta blocker for rate control with hx of A.Fib.    Interval History   Patient is resting comfortably. She is a bit groggy after her surgery this morning. She denies concerns from a cardiac standpoint. She has not had further episodes of bradycardia or heart block on telemetry since metoprolol has been discontinued.  Discussed the plan of care as outlined below with the patient and her daughter Juanita who is at the bedside. They stated understanding and agreement.    Telemetry: NSR with occasional PVCs    Assessment & Plan   1. Mechanical fall; left hip fracture  - Ortho following. Status post open reduction internal fixation left proximal femur fracture this morning.    2. Intermittent Mobitz type II second-degree heart block and bradycardia on telemetry  - Currently in sinus rhythm with no recurrent issues with bradycardia or AV block since stopping the beta blocker.     3. Paroxysmal atrial fibrillation  - PTA on a rate control strategy with metoprolol succinate 100 mg twice daily and anticoagulation with renally dose adjusted apixaban 2.5 mg twice daily.  - Metoprolol now on hold due to bradycardia and AV block. Currently in sinus rhythm.     4. Hypertension  - PTA on metoprolol as above and amlodipine 5 mg daily.     5. CKD, briefly on  dialysis in the past and s/p kidney transplant in 1997  - Follows with nephrology. Unfortunately with recent progression with creatinine around 2.5-3.1 following pyelonephritis infection in her transplanted kidney.       Plan:   1. Continue off of metoprolol. Issues with intermittent heart block seem to have resolved so does not seem to have issues with sick sinus syndrome warranting pacemaker at this point.   2. Continue to monitor on telemetry.   3. Can resume eliquis for anticoagulation for A.Fib when appropriate per Orthopedic surgery post-op.  4. Could consider sending home with a 7-day cardiac event monitor. Lower dose beta-blocker could be considered if she does not have further issues with bradycardia and heart block and starts having issues with elevated rates with her paroxysmal atrial fibrillation in follow-up.  5.  Will arrange follow-up with cardiology in 1-2 weeks post discharge.    I spent 35 minutes face-to-face or coordinating care of Cande Baird. Over 50% of our time on the unit was spent counseling the patient and/or coordinating care.    Thank you for the opportunity to participate in this pleasant patient's care.     AMEE Ross, CNP   Nurse Practitioner  [facility]  Pager: [phone]  Text Page  (8am - 5pm, M-F)    Patient Active Problem List   Diagnosis     Long-term use of immunosuppressant medication     Immunosuppressed status (H)     S/P kidney transplant     Arthritis     Secondary hyperparathyroidism of renal origin (H)      Advance Care Planning     Anxiety     Pyelonephritis     Generalized weakness     Atrial fibrillation with RVR (H)     Anticoagulated     History of kidney transplant     Closed nondisplaced intertrochanteric fracture of left femur, initial encounter (H)     Closed intertrochanteric fracture of hip, left, initial encounter (H)     Atrial fibrillation, unspecified type (H)     Physical Exam   Temp: 97  F (36.1  C) Temp src: Oral BP: 115/44  Pulse: 86   Resp: 18 SpO2: 100 % O2 Device: Nasal cannula Oxygen Delivery: 2 LPM  Vitals:    04/22/22 0551   Weight: 57.5 kg (126 lb 12.8 oz)     Vital Signs with Ranges  Temp:  [97  F (36.1  C)-98.8  F (37.1  C)] 97  F (36.1  C)  Pulse:  [78-97] 86  Resp:  [10-18] 18  BP: (114-145)/(44-67) 115/44  SpO2:  [89 %-100 %] 100 %  I/O last 3 completed shifts:  In: -   Out: 100 [Urine:100]    Constitutional: Appears her stated age, well nourished, and in no acute distress.  Eyes: Pupils equal, round. Sclerae anicteric.   HEENT: Normocephalic, atraumatic.   Respiratory: Breathing non-labored. Lungs clear to auscultation bilaterally. No crackles, wheezes, rhonchi, or rales.  Cardiovascular: Regular rate and rhythm, normal S1 and S2. No murmur, rub, or gallop.  Skin: Warm, dry.   Musculoskeletal/Extremities: Moves all extremities well and symmetrically. No LE edema bilaterally.  Neurologic: No gross focal deficits. Alert, cooperative.  Psychiatric: Affect appropriate. Responding to questions appropriately.    Medications     lactated ringers 75 mL/hr at 04/22/22 0959       acetaminophen  975 mg Oral Q8H     amLODIPine  5 mg Oral Daily     azaTHIOprine  50 mg Oral At Bedtime     ceFAZolin  1 g Intravenous Q8H     cycloSPORINE modified  25 mg Oral BID     docusate sodium  100 mg Oral BID     [START ON 4/23/2022] polyethylene glycol  17 g Oral Daily     senna-docusate  1 tablet Oral BID     sodium chloride (PF)  3 mL Intracatheter Q8H     sodium chloride (PF)  3 mL Intracatheter Q8H     cholecalciferol  50 mcg Oral Daily     Data   Recent Labs   Lab 04/22/22  0509 04/21/22  0800 04/20/22  2131 04/20/22  1416   WBC 24.0* 13.5* 14.6* 13.5*   HGB 8.4* 8.2* 10.0* 10.0*    102* 98 97    253 295 295   INR  --   --   --  1.41*    138  --  133   POTASSIUM 4.6 3.7  --  3.9   CHLORIDE 108 112*  --  103   CO2 20 19*  --  23   BUN 56* 44*  --  53*   CR 3.66* 2.71*  --  3.31*   ANIONGAP 7 7  --  7   LETI 8.6 7.5*  --  8.9    * 95  --  108*     This note was completed in part using Dragon voice recognition software. Although reviewed after completion, some word and grammatical errors may occur.

## 2022-04-22 NOTE — PLAN OF CARE
Goal Outcome Evaluation:    VSS on 1 L NC. Tele SR. A/O x 4. Assist of 2 w/ lift. PRN oxycodone given x1, scheduled tylenol for left leg/hip pain. NPO since 0000 for surgery today. RPIV, NS infusing @ 100 mL/hr. CHG bath complete, bed changed. Report given to PACU, family contact info provided for updates. Pt sent to OR @ 0615. Pt may transfer back to ortho following procedure. Will continue with plan of care.

## 2022-04-22 NOTE — PROGRESS NOTES
Pt transferred back from OR, daughter Juanita at bedside. Cont on capno, post-op vitals, alarms on for safety. Plan of care reviewed with patient and daughter, all questions.     Jasmin Guzmán RN on 4/22/2022 at 10:08 AM

## 2022-04-22 NOTE — PROGRESS NOTES
Patient transferred to Ortho unit. Report called to Dianne BOYLE. Pt has not voided yet. UA sample still needed.     Jasmin Guzmán RN on 4/22/2022 at 1:53 PM

## 2022-04-22 NOTE — ANESTHESIA CARE TRANSFER NOTE
Patient: Cande Baird    Procedure: Procedure(s):  Open reduction internal fixation left proximal femur fracture       Diagnosis: Closed nondisplaced intertrochanteric fracture of left femur, initial encounter (H) [S62.949A]  Diagnosis Additional Information: No value filed.    Anesthesia Type:   General     Note:    Oropharynx: oropharynx clear of all foreign objects and spontaneously breathing  Level of Consciousness: drowsy  Oxygen Supplementation: face mask    Independent Airway: airway patency satisfactory and stable  Dentition: dentition unchanged  Vital Signs Stable: post-procedure vital signs reviewed and stable  Report to RN Given: handoff report given  Patient transferred to: PACU    Handoff Report: Identifed the Patient, Identified the Reponsible Provider, Reviewed the pertinent medical history, Discussed the surgical course, Reviewed Intra-OP anesthesia mangement and issues during anesthesia, Set expectations for post-procedure period and Allowed opportunity for questions and acknowledgement of understanding      Vitals:  Vitals Value Taken Time   BP     Temp     Pulse     Resp     SpO2         Electronically Signed By: AMEE Linares CRNA  April 22, 2022  8:40 AM

## 2022-04-22 NOTE — OP NOTE
Procedure Date: 04/22/2022    PREOPERATIVE DIAGNOSIS:  Left hip intertrochanteric fracture.    POSTOPERATIVE DIAGNOSIS:  Left hip intertrochanteric fracture.    PROCEDURE:  Left hip cephalomedullary nail.    SURGEON:  Raoul White MD    FIRST ASSISTANT:  SARA Jain.  A skilled first assistant was necessary for patient positioning, prepping, draping, help with soft tissue retraction, help with nail placement, closing and patient transfer.    ANESTHESIA:  General and local.    COMPLICATIONS:  None apparent.    ESTIMATED BLOOD LOSS:  30 mL    IMPLANTS:  Synthes short TFN nail, size 11 with appropriate-sized cephalomedullary screw size 85, as well as a distal interlocking screw size 34.    INDICATIONS FOR PROCEDURE:  The patient is an 86-year-old female with a history of atrial fibrillation on chronic anticoagulation, as well as status post a kidney transplant, who had a mechanical fall on the 20th.  She was brought to the ER and was found to have a nondisplaced intertrochanteric fracture.  She was also anticoagulated, so had to be delayed in terms of surgery.  Additionally, she also needed a cardiology workup.  When she was cleared from a medical perspective, we discussed with the patient the treatment options, and decided the best way moving forward would be a cephalomedullary nail in order to stabilize the fracture.  She agreed to proceed.    DESCRIPTION OF PROCEDURE:  On the day of the procedure, the patient was met in the preoperative area by the Surgery and Anesthesia team.  The left hip was marked by the operative surgeon.  Informed consent was obtained.  Risks and benefits of the surgery were discussed with the patient including risk of bleeding, infection, damage to neurovascular structures, stiffness, continued pain, nonunion, malunion, need for future revision surgery.  She understood and agreed to proceed.  Our Anesthesia colleagues then brought the patient to the operating room and general  anesthesia was administered.  She was then placed into the into the supine position on a Holden table, and the fracture was nondisplaced; therefore, it was checked under fluoroscopy.  Left hip was then prepped and draped in the usual sterile fashion.  Preoperative antibiotics given.  A preoperative timeout was performed.  We began the procedure by making a standard incision just proximal to the greater trochanter.  Sharp dissection was carried down through the skin and subcutaneous tissue.  The fascia was split over the tensor fascia luke, and the tip of the greater trochanter was palpated.  The guide pin was placed at the tip of the greater trochanter and double checked under AP and lateral views, then impacted into position.  It was then over-reamed using the opening reamer and a size 11 short TFN nail was then impacted into position.  We then used the jig in order to place the second guide pin into the center of the femoral head.  It was double checked under AP and lateral views, measured, overdrilled, and a size 85 cephalomedullary screw was screwed into position.  This was then locked in proximally to make a static construct.  Through the jig in the same distal incision, we then placed a secondary distal interlocking screw, and it was a size 34.  That was placed without difficulty.  The jig was then removed.  Final x-rays were taken that demonstrated excellent reduction and excellent hardware placement.  The wounds were then copiously irrigated and we turned our attention to closure.  Wounds were closed in a layered fashion.  Sterile dressings were applied.  We did inject local anesthetic into the soft tissues.  The patient was then awakened from anesthesia and brought to the PACU for recovery.  Postoperatively, she will be weightbearing as tolerated.     Raoul White MD        D: 2022   T: 2022   MT: ERAN    Name:     SHAWN STEVEN  MRN:      [id number]        Account:        [id number]   :       1935           Procedure Date: 04/22/2022     Document: [id number]

## 2022-04-22 NOTE — ANESTHESIA PREPROCEDURE EVALUATION
Anesthesia Pre-Procedure Evaluation    Patient: Cande Baird   MRN: [id number] : 1935        Procedure : Procedure(s):  Open reduction internal fixation left proximal femur fracture          Past Medical History:   Diagnosis Date     Arthritis      Atrial fibrillation with RVR (H) 2021     Benign essential hypertension      Glomerulonephritis      Renal transplant recipient      Skin cancer     does not see dermatologist     Stage 3b chronic kidney disease (H)       Past Surgical History:   Procedure Laterality Date     BIOPSY  6 months    Squeamish cell-removed     COLONOSCOPY  5/15/2013     LAPAROSCOPIC CYSTECTOMY OVARIAN (BENIGN)       TRANSPLANT  1987    Kidney      No Known Allergies   Social History     Tobacco Use     Smoking status: Former Smoker     Packs/day: 0.00     Years: 0.00     Pack years: 0.00     Types: Cigarettes     Quit date: 1988     Years since quittin.9     Smokeless tobacco: Former User     Quit date: 10/17/1987   Substance Use Topics     Alcohol use: Yes     Comment: 1 or 2 glasses of wine a week or less      Wt Readings from Last 1 Encounters:   22 57.5 kg (126 lb 12.8 oz)        Anesthesia Evaluation   Pt has had prior anesthetic. Type: General.    No history of anesthetic complications       ROS/MED HX  ENT/Pulmonary:  - neg pulmonary ROS     Neurologic:  - neg neurologic ROS     Cardiovascular:     (+) hypertension-----Taking blood thinners Pt has received instructions: Instructions Given to patient: held. CHF etiology: diastolic dysfunction Last EF: 70 NYHA classification: I. dysrhythmias, a-fib and 2nd Deg Heart Block,     METS/Exercise Tolerance:     Hematologic:  - neg hematologic  ROS     Musculoskeletal:   (+) arthritis, fracture, Fracture location: LLE,     GI/Hepatic:  - neg GI/hepatic ROS     Renal/Genitourinary:     (+) renal disease, type: CRI, Pt does not require dialysis, Pt has history of transplant,     Endo:  - neg endo ROS      Psychiatric/Substance Use:  - neg psychiatric ROS     Infectious Disease:  - neg infectious disease ROS     Malignancy:  - neg malignancy ROS     Other:  - neg other ROS          Physical Exam    Airway        Mallampati: II   TM distance: > 3 FB   Neck ROM: limited   Mouth opening: > 3 cm    Respiratory Devices and Support         Dental       (+) missing      Cardiovascular          Rhythm and rate: irregular and normal     Pulmonary   pulmonary exam normal            Other findings: Lab Test        04/22/22 04/21/22 04/20/22 04/20/22 09/18/21 09/17/21 08/26/21 08/25/21                       0509          0800          2131          1416          0150          2124          0818          0803          WBC          24.0*        13.5*        14.6*        13.5*          < >        8.3            < >        8.8           HGB          8.4*         8.2*         10.0*        10.0*          < >        12.6           < >        11.7          MCV          100          102*         98           97             < >        94             < >        92            PLT          264          253          295          295            < >        245            < >        332           INR           --           --           --          1.41*         --          0.99          --          1.18*          < > = values in this interval not displayed.                  Lab Test        04/22/22 04/21/22 04/20/22                       0509          0800          1416          NA           135          138          133           POTASSIUM    4.6          3.7          3.9           CHLORIDE     108          112*         103           CO2          20           19*          23            BUN          56*          44*          53*           CR           3.66*        2.71*        3.31*         ANIONGAP     7            7            7             LETI          8.6          7.5*         8.9           GLC           113*         95           108*                 EKG Interpretation:  Sinus rhythm Possible Left atrial enlargement Borderline ECG    OUTSIDE LABS:  CBC:   Lab Results   Component Value Date    WBC 24.0 (H) 04/22/2022    WBC 13.5 (H) 04/21/2022    HGB 8.4 (L) 04/22/2022    HGB 8.2 (L) 04/21/2022    HCT 27.4 (L) 04/22/2022    HCT 27.2 (L) 04/21/2022     04/22/2022     04/21/2022     BMP:   Lab Results   Component Value Date     04/22/2022     04/21/2022    POTASSIUM 4.6 04/22/2022    POTASSIUM 3.7 04/21/2022    CHLORIDE 108 04/22/2022    CHLORIDE 112 (H) 04/21/2022    CO2 20 04/22/2022    CO2 19 (L) 04/21/2022    BUN 56 (H) 04/22/2022    BUN 44 (H) 04/21/2022    CR 3.66 (H) 04/22/2022    CR 2.71 (H) 04/21/2022     (H) 04/22/2022    GLC 95 04/21/2022     COAGS:   Lab Results   Component Value Date    PTT 30 08/25/2021    INR 1.41 (H) 04/20/2022     POC: No results found for: BGM, HCG, HCGS  HEPATIC:   Lab Results   Component Value Date    ALBUMIN 3.1 (L) 09/17/2021    PROTTOTAL 6.9 09/17/2021    ALT 33 09/17/2021    AST 39 09/17/2021    ALKPHOS 116 09/17/2021    BILITOTAL 0.5 09/17/2021     OTHER:   Lab Results   Component Value Date    LACT 1.2 09/17/2021    LETI 8.6 04/22/2022    PHOS 4.9 (H) 04/21/2022    MAG 1.8 04/21/2022    TSH 2.20 09/17/2021    CRP 33.0 (H) 12/20/2016    SED 41 (H) 12/20/2016       Anesthesia Plan    ASA Status:  3      Anesthesia Type: General.     - Airway: LMA   Induction: Intravenous.   Maintenance: Balanced.        Consents    Anesthesia Plan(s) and associated risks, benefits, and realistic alternatives discussed. Questions answered and patient/representative(s) expressed understanding.     - Discussed: Risks, Benefits and Alternatives for BOTH SEDATION and the PROCEDURE were discussed     - Discussed with:  Patient      - Extended Intubation/Ventilatory Support Discussed: No.      - Patient is DNR/DNI Status: No    Use of blood products discussed: Yes.     -  Discussed with: Patient.     - Consented: consented to blood products            Reason for refusal: other.     Postoperative Care    Pain management: IV analgesics, Oral pain medications, Multi-modal analgesia.   PONV prophylaxis: Ondansetron (or other 5HT-3), Dexamethasone or Solumedrol, Background Propofol Infusion     Comments:                Jaime Scott MD

## 2022-04-22 NOTE — PROVIDER NOTIFICATION
0799 MD paged: O2 sats 88-89% on RA. 2 L NC applied, sats 96%. Could I please get orders for supplemental O2? Thanks!

## 2022-04-22 NOTE — PROGRESS NOTES
End of Shift Summary Note  86-y/o female admitted on 04/20/22 for evaluation of fall and found to have left hip fracture. Ortho consulted. Patient transferred from ortho unit overnight due to rhythm changes seen on telemetry. Cards consulted. H/o paroxysmal A. Fib, on beta blocker and Eliquis. Hx of kidney transplant.      Pertinent Assessment  Patient returned from OR this morning. S/p open reduction internal fixation left proximal femur fracture. Awake, alert, drowsy. Daughter at bedside. Sleeping in between cares.  Post-op vitals remain stable, on continuous Capno. On 2L oxygen supplement.         Plan: IVF infusing at 75 mL/hr, ok to change to saline lock when patient is tolerating PO. PT consulted. WBAT. Plan to advance diet when patient is more awake. Per Ortho okay to resume Eliquis on POD #1. Cont to hold Metoprolol per Cardiology. Plan of care reviewed with patient and family, all questions answered. Plan to transfer patient to Ortho unit when bed is available.  Family updated.     Vital signs:  Temp: 97.3  F (36.3  C) Temp src: Oral BP: 123/46 Pulse: 74   Resp: 17 SpO2: 100 % O2 Device: Nasal cannula Oxygen Delivery: 2 LPM Height: 152.4 cm (5') Weight: 57.5 kg (126 lb 12.8 oz)  Estimated body mass index is 24.76 kg/m  as calculated from the following:    Height as of this encounter: 1.524 m (5').    Weight as of this encounter: 57.5 kg (126 lb 12.8 oz).    Jasmin Guzmán RN on 4/22/2022 at 1:51 PM

## 2022-04-22 NOTE — BRIEF OP NOTE
[facility]    Brief Operative Note    Pre-operative diagnosis: Closed nondisplaced intertrochanteric fracture of left femur, initial encounter (H) [D49.872L]  Post-operative diagnosis Same as pre-operative diagnosis    Procedure: Procedure(s):  Open reduction internal fixation left proximal femur fracture  Surgeon: Surgeon(s) and Role:     * Raoul White MD - Primary     * Lacey Hagen PA-C - Assisting  Anesthesia: Choice   Estimated Blood Loss: 70 mL from 4/22/2022  7:38 AM to 4/22/2022  8:35 AM      Drains: None  Specimens: * No specimens in log *  Findings:   None.  Complications: None.  Implants:   Implant Name Type Inv. Item Serial No.  Lot No. LRB No. Used Action   11mm/ 130 degree TI Thomas TFNA, 170mm Metallic Hardware/[place]   OnGreen [id number] Left 1 Implanted   IMP SCR SYN TFNA FENESTRATED LAG 85MM [id number] - [id number] Metallic Hardware/[place] IMP SCR SYN TFNA FENESTRATED LAG 85MM [id number]  SYNTHES[employer]STRAT[employer] [id number] Left 1 Implanted   IMP SCR SYN 5.0 TI LOCK T25 STARDRIVE 34MM [id number] - [id number] Metallic Hardware/[place] IMP SCR SYN 5.0 TI LOCK T25 STARDRIVE 34MM [id number]  OnGreen-STRAT[employer] [id number] Left 1 Implanted     Plan:  WBAT  DVT prophylaxis - SCDs & resume Eliquis POD#1  Ancef x 24 hours  PT  Keep dressings C/D/I for 1 week; okay to shower    Follow up with Jennifer Hagen PA-C in clinic in 2 weeks.

## 2022-04-22 NOTE — ANESTHESIA PROCEDURE NOTES
Airway       Patient location during procedure: OR       Procedure Start/Stop Times: 4/22/2022 7:43 AM  Staff -        Anesthesiologist:  Jaime Scott MD       CRNA: Cee Wu APRN CRNA       Performed By: anesthesiologist  Consent for Airway        Urgency: elective  Indications and Patient Condition       Indications for airway management: karsten-procedural       Induction type:intravenous       Mask difficulty assessment: 1 - vent by mask    Final Airway Details       Final airway type: supraglottic airway    Supraglottic Airway Details        Type: LMA       Brand: I-Gel       LMA size: 4    Post intubation assessment        Placement verified by: capnometry        Number of attempts at approach: 1       Secured with: plastic tape       Ease of procedure: easy       Dentition: Intact    Medication(s) Administered   Medication Administration Time: 4/22/2022 7:43 AM

## 2022-04-23 NOTE — CONSULTS
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 She is having small volume post void residuals. Unable to do allograft ultrasound here at [facility].  -Agree with ongoing IVF  -Rx UTI per primary team  -Would SIC if has PVR of 100 or so.    <Probable UTI  ~Culture pending  -empiric abx pending culture.    <Hyponatremia  ~Likely renal failure +fluids vs. SIADH. Not advisable to fluid restrict given VERONIQUE. Since does have low total CO2, can treat and that will provide substantial sodium.  -Oosm ordered  -Will start sodium bicarbonate.      <Non anion gap acidosis  ~Likely related to CKD  -Sodium bicarbonate.    Lee Villanueva MD  [facility], Nephrology  Cell:[phone]  Pager:[phone]    Securely message with the Vocera Web Console (learn more here)  Text page via [employer] Paging/Directory         /\/\/\/\/\/\/\/\/\/\/\/\/\/\/\/\/\/\/\/\/\/\/\/\/\/\    Reason for Consult     I was asked to see the patient for VERONIQUE in renal transplant patient..    Primary Care Physician     [facility]    Chief Complaint     Hip fracture        History of Present Illness     Cande Baird is a 86 year old female who presented after a fall  and was fount to have a left hip fracture. Surgery delayed due to anticoagulation.     She had a  donor kidney transplant from an 18 year old male in . The allograft has worked well for many years.    More recently, the creatinine has been starting to rise. Back in February it was about 3 at which point she was diagnosed with a UTI and treated with a cephalosporin. She has been tolerating her antirejection medications and is only on low dose gengraf and azathioprine.    Over the past several days, the creatinine has been increasing. In the same time frame, there  has been elevation in the WBC up to 22K. Empiric antibiotics have been given and the urine cultured.     Cande cruz feels quite well all things considered. She describes her urination as being fine, although she thinks it is less than usual. She is not having dysuria, frequency or urgency.     Admission creatinine was 3.3 and has increased to 4 today. Other chemistries have not been a problem.           History is obtained from the patient and chart review.      Past Medical History   I have reviewed this patient's medical history and updated it with pertinent information if needed.   Past Medical History:   Diagnosis Date     Arthritis      Atrial fibrillation with RVR (H) 09/17/2021     Benign essential hypertension      Glomerulonephritis      Renal transplant recipient 1997     Skin cancer     does not see dermatologist     Stage 3b chronic kidney disease (H)        Past Surgical History   I have reviewed this patient's surgical history and updated it with pertinent information if needed.  Past Surgical History:   Procedure Laterality Date     BIOPSY  6 months    Squeamish cell-removed     COLONOSCOPY  5/15/2013     LAPAROSCOPIC CYSTECTOMY OVARIAN (BENIGN)       TRANSPLANT  October 17, 1987    Kidney       Prior to Admission Medications   Prior to Admission Medications   Prescriptions Last Dose Informant Patient Reported? Taking?   GENGRAF (BRAND) 25 MG CAPSULE 4/20/2022 at am  Yes Yes   Sig: Take 25 mg by mouth 2 times daily   amLODIPine (NORVASC) 5 MG tablet 4/20/2022 at Unknown time  Yes Yes   Sig: Take 5 mg by mouth daily   apixaban ANTICOAGULANT (ELIQUIS ANTICOAGULANT) 2.5 MG tablet 4/20/2022 at am  No Yes   Sig: Take 1 tablet (2.5 mg) by mouth 2 times daily   azaTHIOprine (IMURAN) 50 MG tablet 4/19/2022 at Unknown time  Yes Yes   Sig: Take 50 mg by mouth At Bedtime   furosemide (LASIX) 20 MG tablet 4/20/2022 at Unknown time  Yes Yes   Sig: Take 20 mg by mouth daily   metoprolol succinate ER (TOPROL-XL)  100 MG 24 hr tablet 4/20/2022 at am  Yes Yes   Sig: Take 100 mg by mouth 2 times daily      Facility-Administered Medications: None     [unfilled]  Allergies   No Known Allergies    Social History   I have reviewed this patient's social history and updated it with pertinent information if needed. Cande Baird  reports that she quit smoking about 33 years ago. Her smoking use included cigarettes. She smoked 0.00 packs per day for 0.00 years. She quit smokeless tobacco use about 34 years ago. She reports current alcohol use. She reports that she does not use drugs. 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    Family History   I have reviewed this patient's family history and updated it with pertinent information if needed. No family history of kidney disease.   Family History   Problem Relation Age of Onset     Diabetes Mother 60     Peptic Ulcer Disease Father      Diabetes Sister      Rheumatoid Arthritis Sister      Diabetes Sister        Review of Systems   The 14 point Review of Systems is negative other than noted in the HPI.     Physical Exam   Temp: 97.3  F (36.3  C) Temp src: Temporal BP: 132/58 Pulse: 85   Resp: 21 SpO2: 93 % O2 Device: None (Room air) Oxygen Delivery: (P) 1 LPM  Vital Signs with Ranges  Temp:  [97.3  F (36.3  C)-97.9  F (36.6  C)] 97.3  F (36.3  C)  Pulse:  [] 85  Resp:  [17-21] 21  BP: (123-135)/(40-58) 132/58  SpO2:  [93 %-100 %] 93 %  126 lbs 12.8 oz    GENERAL APPEARANCE:  Alert, in no distress, pleasant, cooperative, oriented x self, place and recent events.   EYES:  EOM, lids, pupils and irises normal, sclera clear and conjunctiva normal  ENT:  Mouth normal, moist mucous membranes, nose normal without drainage or crusting, external ears without lesions, hearing acuity grossly intact  NECK: Supple, symmetrical, trachea midline, No JVD  RESP:  Respiratory effort normal,no respiratory distress,  Lung sounds clear   CV:  Auscultation of heart done, rate and rhythm controlled and regular, + systolic murmur, no rub or gallop. ABDOMEN:  Hypoactive bowel sounds, soft, nontender, no palpable masses. Allograft is not overly firm or tender.  EXT: Edema none bilateral lower extremities.  No gross deformities.  SKIN:  skin on extremities warm, dry and intact without rashes  NEURO: cranial nerves grossly intact, no facial asymmetry, no speech deficits and able to follow directions, moves all extremities symmetrically  PSYCH:  insight and judgement and memory appear intact, affect and mood normal    Data   BMP  Recent Labs   Lab 04/23/22  0706 04/22/22  0509 04/21/22  0800 04/20/22  1416   * 135 138 133   POTASSIUM 4.4 4.6 3.7 3.9   CHLORIDE 103 108 112* 103   LETI 8.8 8.6 7.5* 8.9   CO2 17* 20 19* 23   BUN 61* 56* 44* 53*   CR 3.95* 3.66* 2.71* 3.31*   * 113* 95 108*     Phos@[id number](phos:4)  CBC)  Recent Labs   Lab 04/23/22  0706 04/22/22  0509 04/21/22  0800 04/20/22  2131   WBC 22.9* 24.0* 13.5* 14.6*   HGB 7.3* 8.4* 8.2* 10.0*   HCT 23.5* 27.4* 27.2* 31.2*   * 100 102* 98    264 253 295     No lab results found in last 7 days.    Invalid input(s): BILIRUBININDIRECT  Recent Labs   Lab 04/20/22  1416   INR 1.41*     25 OH Vit D2   Date Value Ref Range Status   09/13/2016 <5 ug/L Final     25 OH Vit D3   Date Value Ref Range Status   09/13/2016 46 ug/L Final     25 OH Vit D total   Date Value Ref Range Status   09/13/2016  20 - 75 ug/L Final    <51  Season, race, dietary intake, and treatment affect the concentration of   25-hydroxy-Vitamin D. Values may decrease during winter months and increase   during summer months. Values 20-29 ug/L may indicate Vitamin D insufficiency   and values <20 ug/L may indicate Vitamin D deficiency.   This test was developed and its performance characteristics determined by the   [facility],  Special Chemistry Laboratory. It has    not been cleared or approved by the FDA. The laboratory is regulated under CLIA   as qualified to perform high-complexity testing. This test is used for clinical   purposes. It should not be regarded as investigational or for research.       Recent Labs   Lab 04/23/22  0706   HGB 7.3*   HCT 23.5*   *     No results for input(s): PTHI in the last 168 hours.

## 2022-04-23 NOTE — PROVIDER NOTIFICATION
Paged nephrology, Dr. Marshall that we cannot do renal transplant US here @ [facility] per ultrasound.

## 2022-04-23 NOTE — PLAN OF CARE
Goal Outcome Evaluation:    Plan of Care Reviewed With: patient, daughter     Overall Patient Progress: improving    Patient vital signs are at baseline: Yes  Patient able to ambulate as they were prior to admission or with assist devices provided by therapies during their stay:  No,  Reason:  Pt Ax2, using gait belt, and walker to bedside chair/commode.  Patient MUST void prior to discharge:  No,  Reason:  Pt voiding but retaining-needed to be straight cathed for 500 ml @ 1430.   Patient able to tolerate oral intake:  Yes  Pain has adequate pain control using Oral analgesics:  Yes-PO scheduled tylenol.  Does patient have an identified :  No,  Reason:  Pt needing TCU.  Has goal D/C date and time been discussed with patient:  Yes-TBD.     Pt A&O-forgetful. CMS intact. Dressing CDI.

## 2022-04-23 NOTE — PROGRESS NOTES
04/23/22 0900   Living Environment   People in Home spouse   Current Living Arrangements independent living facility   Home Accessibility no concerns   Transportation Anticipated car, drives self;family or friend will provide   Living Environment Comments Spouse does not drive per pt, d/t his cognition; pt's dtr also occ helps with errands   Self-Care   Usual Activity Tolerance moderate   Current Activity Tolerance fair   Equipment Currently Used at Home walker, rolling  (4WW)   Fall history within last six months yes   Number of times patient has fallen within last six months 1   Activity/Exercise/Self-Care Comment Pt reports IND with mobility using 4WW and IND with basic ADLs; spouse assists with laundry at times and cooking   General Information   Onset of Illness/Injury or Date of Surgery 04/22/22   Referring Physician Lacey Hagen PA-C   Patient/Family Therapy Goals Statement (PT) to have less pain   Pertinent History of Current Problem (include personal factors and/or comorbidities that impact the POC) 86 year old female with past medical history of afib on chronic anticoagulation, CKD stage 3b s/p kidney transplant on immunosuppression, HTN who resides in a senior living facility who was admitted on 4/20/2022 after accidentally falling off of her bed and landing on her side and was found to have left intertrochanteric hip fracture.  Patient underwent surgical repair today.   Existing Precautions/Restrictions fall   Weight-Bearing Status - LLE weight-bearing as tolerated   General Observations Pt very tangential and easily distracted throughout session   Cognition   Affect/Mental Status (Cognition) anxious   Orientation Status (Cognition) oriented x 3   Follows Commands (Cognition) follows one-step commands;75-90% accuracy;delayed response/completion;increased processing time needed;initiation impaired   Safety Deficit (Cognition) minimal deficit;at risk behavior observed;awareness of need for  assistance;insight into deficits/self-awareness;judgment   Pain Assessment   Patient Currently in Pain   (reports pain but does not rate 0-10)   Posture    Posture Forward head position;Protracted shoulders;Kyphosis   Range of Motion (ROM)   Range of Motion ROM deficits secondary to pain   Strength (Manual Muscle Testing)   Strength (Manual Muscle Testing) Deficits observed during functional mobility   Strength Comments difficulty with sit <> stand demonstrating decreased quad and glute strength   Bed Mobility   Bed Mobility supine-sit   Supine-Sit [place] (Bed Mobility) moderate assist (50% patient effort);verbal cues   Bed Mobility Limitations decreased ability to use legs for bridging/pushing   Impairments Contributing to Impaired Bed Mobility pain;decreased ROM;decreased strength   Assistive Device (Bed Mobility) bed rails  (HOB elevated)   Transfers   Transfers sit-stand transfer;bed-chair transfer   Bed-Chair Transfer   Assistive Device (Bed-Chair Transfers) walker, front-wheeled   Bed-Chair [place] (Transfers) minimum assist (75% patient effort);2 person assist;verbal cues   Comment, (Bed-Chair Transfer) cues to sequence throughout   Sit-Stand Transfer   Sit-Stand [place] (Transfers) moderate assist (50% patient effort);minimum assist (75% patient effort);2 person assist;verbal cues   Assistive Device (Sit-Stand Transfers) walker, front-wheeled   Comment, (Sit-Stand Transfer) cues for hand placement and weight shifting   Gait/Stairs (Locomotion)   [place] Level (Gait) minimum assist (75% patient effort);contact guard;2 person assist   Assistive Device (Gait) walker, front-wheeled   Distance in Feet (Required for LE Total Joints) 3   Pattern (Gait) 3-point   Deviations/Abnormal Patterns (Gait) left sided deviations;antalgic;gait speed decreased   Balance   Balance Comments heavy reliance on 2WW for stability   Clinical Impression   Criteria for Skilled Therapeutic Intervention Yes,  treatment indicated   PT Diagnosis (PT) decreased functional mobility   Influenced by the following impairments pain, decreased ROM, decreased strength, anxiety?   Functional limitations due to impairments bed mobility, transfers, ambulation   Clinical Presentation (PT Evaluation Complexity) Stable/Uncomplicated   Clinical Presentation Rationale clinical judgement   Clinical Decision Making (Complexity) low complexity   Planned Therapy Interventions (PT) balance training;bed mobility training;gait training;home exercise program;manual therapy techniques;neuromuscular re-education;postural re-education;ROM (range of motion);strengthening;stretching;transfer training;home program guidelines   Anticipated Equipment Needs at Discharge (PT)   (defer to next facility)   Risk & Benefits of therapy have been explained evaluation/treatment results reviewed;care plan/treatment goals reviewed;risks/benefits reviewed;current/potential barriers reviewed;participants voiced agreement with care plan;participants included;patient   PT Discharge Planning   PT Discharge Recommendation (DC Rec) Transitional Care Facility   PT Rationale for DC Rec Pt is below baseline for mobility, decreased tolerance for ambulation noted; heavy assist required for bed mobility and sit <> stand transfers; does not seem that spouse would be able to physically assist at level pt currently requires for safety; would benefit from TCU to maximize strength and IND with mobility prior to discharge back to [facility]   PT Brief overview of current status mod A for mobility, 2WW   Plan of Care Review   Plan of Care Reviewed With patient   Total Evaluation Time   Total Evaluation Time (Minutes) 10   Physical Therapy Goals   PT Frequency 5x/week   PT Predicted Duration/Target Date for Goal Attainment 04/28/22   PT Goals Bed Mobility;Transfers;Gait   PT: Bed Mobility Supervision/stand-by assist;Supine to/from sit   PT: Transfers Supervision/stand-by assist;Sit to/from  stand;Bed to/from chair;Assistive device   PT: Gait Supervision/stand-by assist;Rolling walker;50 feet

## 2022-04-23 NOTE — PROGRESS NOTES
Cross cover notified of urinalysis results.  She presented with a leukocytosis of 13.5 which is persisted and increased to 24 this morning.  Urinalysis shows small blood, large LE, >182 WBCs, 18 RBCs, many bacteria and yeast.  -Start IV ceftriaxone 1 g every 24 hours  -Change IV fluids from LR to NS for compatibility  -Urine  sent for culture

## 2022-04-23 NOTE — PROVIDER NOTIFICATION
[id number] MD paged. FYI - Urinalysis: Many bacteria and yeast in urine. urine color orange and cloudy. small amount of blood in urine. Large leukocyte. .    0553: Dr Roth addressed urinalysis and added rocephin and changed IVF to NS.

## 2022-04-23 NOTE — PROGRESS NOTES
Orthopedic Surgery  Cande Baird  2022  Admit Date:  2022  POD 1 Day Post-Op  S/P Procedure(s):  Open reduction internal fixation left proximal femur fracture    Patient is sitting in bed - working with PT.    Reports mild pain at rest.   Reports some ongoing nausea this AM.       Alert and orient to person, place, and time. Anxious   Vital Sign Ranges  Temperature Temp  Av.6  F (36.4  C)  Min: 97  F (36.1  C)  Max: 98  F (36.7  C)   Blood pressure Systolic (24hrs), Av , Min:115 , Max:145        Diastolic (24hrs), Av, Min:40, Max:58      Pulse Pulse  Av.9  Min: 74  Max: 105   Respirations Resp  Av.2  Min: 12  Max: 21   Pulse oximetry SpO2  Av.9 %  Min: 90 %  Max: 100 %       dressing is clean, dry, and intact. Minimal erythema of the surrounding skin. tegaderm and gauze in place.   Bilateral calves are soft, non-tender.  left lower extremity is NVI.   Dorsiflexion and plantarflexion intact.     Labs:  Recent Labs   Lab Test 22  0706 22  0509 22  0800   POTASSIUM 4.4 4.6 3.7     Recent Labs   Lab Test 22  0706 22  0509 22  0800   HGB 7.3* 8.4* 8.2*     Recent Labs   Lab Test 22  1416 21  2124 21  0803   INR 1.41* 0.99 1.18*     Recent Labs   Lab Test 22  0706 22  0509 22  0800    264 253       A/P  1. S/p ORIF left IT fx.    Continue Eliquis restarted today for DVT prophylaxis.     Mobilize with PT/OT WBAT.     Leave dressing undisturbed for a week. Ok to shower with dressing in place.   Continue current pain regimen.    2. Disposition   Anticipate d/c to TCU pending progress with PT and medical stability.    Merari RUIZ  [facility]  [phone]     no

## 2022-04-23 NOTE — PROGRESS NOTES
[facility]  Hospitalist Progress Note  Admit 4/20/2022 11:47 AM    Name: Cande Baird    MRN: [id number]  Provider:  Ryan Crowley MD, [facility]    Date of Service: 04/23/2022     Reason for Stay (Diagnosis): Intratrochanteric hip fracture         Summary of hospital stay & Assessment/Plan:   Summary of Stay: Cande Baird is a 86 year old female who was admitted on 4/20/2022    86 year old female with past medical history of afib on chronic anticoagulation, CKD stage 3b s/p kidney transplant on immunosuppression, HTN who resides in a senior living facility who was admitted on 4/20/2022 after accidentally falling off of her bed and landing on her side and was found to have left intertrochanteric hip fracture.  Patient underwent surgical repair today.     Problem List/Assessment and Plan:      Mechanical fall with left intertrochanteric hip fracture status postsurgical repair on 4/22: Patient fell when she was sitting on her bed (mechanical in nature).  Found to have intertrochanteric fracture which was repaired by orthopedics on 4/22 she had open reduction and internal fixation.  -Orthopedics following   -Pain control with tramadol, Tylenol or IV Dilaudid for severe pain  -PT/OT/social work consults  -Eliquis was okay to be restarted by surgery we will restart a lower dose especially with worsening kidney function as recommended by cardiology     Paroxysmal atrial fibrillation and chronic diastolic CHF: Patient follows with Dr. Reynaga.  Patient had a Gisel scan in 10/2021 without any ischemia.  She is on Toprol 100 mg twice daily as well as Eliquis 2.5 mg twice daily for rate control and prophylaxis as well as amlodipine and Lasix.  Telemetry has shown episodes of atrial fibrillation with high-grade block and episodes of 2-1 block.  Cardiology was consulted in the preoperative setting.  It was recommended to hold her beta-blocker given 2-1 heart block and observe on telemetry.  Should she not improve  consideration would be need to be given to possible sick sinus syndrome and PPM.  -Cardiology consulted, appreciate recommendations  -Continue to hold beta-blocker, heart rate is reasonably controlled     Acute kidney injury with CKD stage IIIb and prior kidney transplant on immunosuppression: Creatinine was 2.6 in 1/2022, 3 in 2/2022 and 3.1 on 4/5/2022.  Her recent baseline seems to be about 3.  She follows with [facility] nephrology.  She was last seen in nephrology on 2/24/2022.  At that time she was found to have a UTI and treated with Keflex.  She was noticing lower extremity edema and was started on Lasix 20 mg daily earlier this month.  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  Creatinine on admission was 3.31, today has increased slightly to 3.95.  Possibly UTI is contributing  -Continue to hold Lasix  -Consult nephrology  -Continue PTA immunosuppressive regimen including azathioprine 50 mg nightly and cyclosporine 25 mg twice daily  -BMP in the morning  -Avoid nephrotoxic agents     Acute on chronic anemia: Baseline hemoglobin approximately 11.  Hemoglobin was 10 on admission and decreased to 8.4 today.  This is in the setting of acute fracture and surgery.  No indication for transfusion now but if she decreases to 7 or less certainly would require this.-CBC in the morning     Probable urinary tract infection, significant leukocytosis: WBC elevated 13.5 on admission, has increased to 24 on 4/22.  She is afebrile.  In care everywhere I find that she has had recent urinary tract infections.  We will check a UCx  - On-call physician started her on Rocephin which we will continue for now and follow-up on urine culture sensitivity     Hypertension: Prior to admission the patient was on metoprolol, Lasix and amlodipine.  As above metoprolol is on hold.  Holding Lasix as well.  -Continue amlodipine    DVT Prophylaxis: DOAC  Code Status:  Full Code    Disposition Plan      Expected discharge in 1-2  days to transitional care unit once kidney function and leukocytosis improved urine culture is back.     Entered: Ryan Crowley 04/23/2022, 11:21 AM                 Interval History:       Denies any complaint, she is alert and oriented and sitting in a chair, pain is reasonably controlled  Today's plan detailed above discussed with nursing               Physical Exam:   Physical Exam   Temp: (P) 97.5  F (36.4  C) Temp src: (P) Temporal BP: 131/54 Pulse: 90   Resp: (P) 18 SpO2: 94 % O2 Device: None (Room air) Oxygen Delivery: (P) 1 LPM  Vitals:    04/22/22 0551   Weight: 57.5 kg (126 lb 12.8 oz)     I/O last 3 completed shifts:  In: 1127 [I.V.:1127]  Out: 1120 [Urine:1050; Blood:70]          GENERAL:  Comfortable.   PSYCH: pleasant, oriented, No acute distress.  EYES: PERRLA, Normal conjunctiva.  HEART:  Normal S1, S2 with no edema.  LUNGS:  Clear to auscultation, normal Respiratory effort.  ABDOMEN:  Soft, no hepatosplenomegaly, normal bowel sounds.  SKIN:  Dry to touch, No rash.      Medications     sodium chloride 75 mL/hr at 04/23/22 0611       acetaminophen  975 mg Oral Q8H     amLODIPine  5 mg Oral Daily     apixaban ANTICOAGULANT  2.5 mg Oral BID     azaTHIOprine  50 mg Oral At Bedtime     cefTRIAXone  1 g Intravenous Q24H     cycloSPORINE modified  25 mg Oral BID     docusate sodium  100 mg Oral BID     metoprolol succinate ER  25 mg Oral Daily     polyethylene glycol  17 g Oral Daily     senna-docusate  1 tablet Oral BID     sodium chloride (PF)  3 mL Intracatheter Q8H     sodium chloride (PF)  3 mL Intracatheter Q8H     cholecalciferol  50 mcg Oral Daily     Data     -Data reviewed today:  I personally reviewed  all new labs and imaging results over the last 24 hours.    Recent Labs   Lab 04/23/22  0706 04/22/22  0509 04/21/22  0800   WBC 22.9* 24.0* 13.5*   HGB 7.3* 8.4* 8.2*   HCT 23.5* 27.4* 27.2*   * 100 102*    264 253     Recent Labs   Lab  04/23/22  0706 04/22/22  0509 04/21/22  0800   * 135 138   POTASSIUM 4.4 4.6 3.7   CHLORIDE 103 108 112*   CO2 17* 20 19*   ANIONGAP 8 7 7   * 113* 95   BUN 61* 56* 44*   CR 3.95* 3.66* 2.71*   GFRESTIMATED 11* 12* 17*   LETI 8.8 8.6 7.5*       No results found for this or any previous visit (from the past 24 hour(s)).    This document was produced using voice recognition software

## 2022-04-23 NOTE — PLAN OF CARE
"OT: Orders received. Chart reviewed and discussed with care team.  IP OT not indicated, per PT note \"Pt is below baseline for mobility, decreased tolerance for ambulation noted; heavy assist required for bed mobility and sit <> stand transfers; does not seem that spouse would be able to physically assist at level pt currently requires for safety; would benefit from TCU to maximize strength and IND with mobility prior to discharge back to ILF.  Defer discharge recommendations to care team and OT to next level of care.  Will complete orders.    "

## 2022-04-23 NOTE — PLAN OF CARE
Goal Outcome Evaluation:    Plan of Care Reviewed With: patient      Patient vital signs are at baseline: Yes  Patient able to ambulate as they were prior to admission or with assist devices provided by therapies during their stay:  No,  Reason:  Assist x 2 BSC  Patient MUST void prior to discharge:  Yes however still monitoring as pt is still having low urine output.  Patient able to tolerate oral intake:  Yes   Pain has adequate pain control using Oral analgesics:  Yes - Tylenol and oral tramadol (tramadol was not given on this shift)  Does patient have an identified :  No - May need TCU  Has goal D/C date and time been discussed with patient: No - TBD       A&O, but forgetful. PIV SL. Was having visual hallucination during Noc. Urine color - milky. UA was collected. Dressing CDI. On Capnography. On 2 L O2 NC. CMS intact. Up x 2 to BSC    Urinalysis: Many bacteria and yeast in urine. urine color orange and cloudy. small amount of blood in urine. Large leukocyte. . Please see lab result. MD notified    From Independent Living facility.

## 2022-04-24 NOTE — PLAN OF CARE
Goal Outcome Evaluation:    Plan of Care Reviewed With: patient     Overall Patient Progress: improving    Patient vital signs are at baseline: yes   Patient able to ambulate as they were prior to admission or with assist devices provided by therapies during their stay: No. Assist of 2 with walker and GB.   Patient MUST void prior to discharge: No. Urinary Retention. Indwelling catheter placed.   Patient able to tolerate oral intake: Yes  Pain has adequate pain control using Oral analgesics: Yes. Tylenol.   Does patient have an identified : No. TCU placement.   Has goal D/C date and time been discussed with patient: No. Pending placement.     Alert & Oriented x4. Forgetful. Sometimes confused. Calm and cooperative.Saddened mood. Patient reports she is baseline gaggy and nauseous. IV infusing with NS at 75 ml/hr.

## 2022-04-24 NOTE — PROGRESS NOTES
"Orthopedic Surgery  Cande Baird  2022  Admit Date:  2022  POD: 2 Days Post-Op  Procedure(s):  Open reduction internal fixation left proximal femur fracture    Patient resting comfortably in bed.    Patient states \"the pain comes and goes.\"  Tolerating oral intake.    Denies nausea or vomiting.  Denies chest pain or shortness of breath.  No events overnight.      Alert and orient to person, place, and time.  Vital Sign Ranges  Temperature Temp  Av.9  F (36.1  C)  Min: 96.3  F (35.7  C)  Max: 97.3  F (36.3  C)   Blood pressure Systolic (24hrs), Av , Min:128 , Max:138        Diastolic (24hrs), Av, Min:49, Max:75      Pulse Pulse  Av.6  Min: 85  Max: 100   Respirations Resp  Av.8  Min: 14  Max: 21   Pulse oximetry SpO2  Av.3 %  Min: 90 %  Max: 94 %       Dressings are clean, dry, and intact.   Minimal erythema of the surrounding skin.   Bilateral calves are soft, non-tender.   Bilateral lower extremity is NVI.  Sensation intact bilateral lower extremities.  Active dorsi and plantar flexion bilaterally.  +DP pulse.     Labs:  Recent Labs   Lab Test 22  0612 22  0706 22  0509   POTASSIUM 4.2 4.4 4.6     Recent Labs   Lab Test 22  0612 22  0706 22  0509   HGB 7.5* 7.3* 8.4*     Recent Labs   Lab Test 22  1416 21  2124 21  0803   INR 1.41* 0.99 1.18*     Recent Labs   Lab Test 22  0612 22  0706 22  0509    238 264       A/P  1. Plan   Continue Eliquis for DVT prophylaxis.     Mobilize with PT/OT    WBAT w/ walker    Continue current pain regiment.    Leave dressings intact     2. Disposition   Anticipate d/c to TCU pending progress with PT and medical clearance.     Lacey Hagen PA-C   "

## 2022-04-24 NOTE — PROGRESS NOTES
[facility]  Hospitalist Progress Note  Admit 4/20/2022 11:47 AM    Name: Cande Baird    MRN: [id number]  Provider: Lucius Sandra MD    Date of Service: 04/24/2022     Reason for Stay (Diagnosis): Intratrochanteric hip fracture         Summary of hospital stay & Assessment/Plan:   Summary of Stay: Cande Baird is a 86 year old female who was admitted on 4/20/2022    86 year old female with past medical history of afib on chronic anticoagulation, CKD stage 3b s/p kidney transplant on immunosuppression, HTN who resides in a senior living facility who was admitted on 4/20/2022 after accidentally falling off of her bed and landing on her side and was found to have left intertrochanteric hip fracture.  Patient underwent surgical repair today.     Problem List/Assessment and Plan:      Mechanical fall with left intertrochanteric hip fracture status postsurgical repair on 4/22: Patient fell when she was sitting on her bed (mechanical in nature).  Found to have intertrochanteric fracture which was repaired by orthopedics on 4/22 she had open reduction and internal fixation.  -Orthopedics following   -Pain control with tramadol, Tylenol or IV Dilaudid for severe pain  -PT/OT/social work consults  -She was restarted on lower dose of Eliquis especially with worsening kidney function as recommended by cardiology     Paroxysmal atrial fibrillation and chronic diastolic CHF: Patient follows with Dr. Reynaga.  Patient had a Gisel scan in 10/2021 without any ischemia.  She is on Toprol 100 mg twice daily as well as Eliquis 2.5 mg twice daily for rate control and prophylaxis as well as amlodipine and Lasix.  Telemetry has shown episodes of atrial fibrillation with high-grade block and episodes of 2-1 block.  Cardiology was consulted in the preoperative setting.  It was recommended to hold her beta-blocker given 2-1 heart block and observe on telemetry.  Should she not improve consideration would be need to be given to  possible sick sinus syndrome and PPM.  -Cardiology consulted, appreciate recommendations  -Continue to hold beta-blocker, heart rate is reasonably controlled     Acute kidney injury with CKD stage IIIb and prior kidney transplant on immunosuppression: Creatinine was 2.6 in 1/2022, 3 in 2/2022 and 3.1 on 4/5/2022.  Her recent baseline seems to be about 3.  She follows with [facility] nephrology.  She was last seen in nephrology on 2/24/2022.  At that time she was found to have a UTI and treated with Keflex.  She was noticing lower extremity edema and was started on Lasix 20 mg daily earlier this month.  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  Creatinine on admission was 3.31, today has increased slightly to 3.95.  Possibly UTI is contributing  -Continue to hold Lasix  -Consult nephrology  -Continue PTA immunosuppressive regimen including azathioprine 50 mg nightly and cyclosporine 25 mg twice daily  -BMP in the morning  -Avoid nephrotoxic agents     Acute on chronic anemia: Baseline hemoglobin approximately 11.  Hemoglobin was 10 on admission and decreased to 8.4 today.  This is in the setting of acute fracture and surgery.  No indication for transfusion now but if she decreases to 7 or less certainly would require this.-CBC in the morning     Probable urinary tract infection, significant leukocytosis: WBC elevated 13.5 on admission, has increased to 24 on 4/22.  She is afebrile.   -Has recent history of UTI.  -Urine culture growing Gram negative bacilli  -Will continue IV Ceftriaxone     Hypertension: Prior to admission the patient was on metoprolol, Lasix and amlodipine.  As above metoprolol is on hold.  Holding Lasix as well.  -Continue amlodipine    DVT Prophylaxis: DOAC  Code Status:  Full Code    Disposition Plan     Expected discharge in 1-2  days to transitional care unit once kidney function and leukocytosis improved urine culture is back.     Entered: Lucius Landin  MD Boaz 04/24/2022, 12:11 PM           Interval History:   Patient seen and examined.  She states that she is feeling better.  She has no fever.  Has some urinary retention. Denies cough or shortness of breath.  No chest pain.               Physical Exam:   Physical Exam   Temp: 97.2  F (36.2  C) Temp src: Temporal BP: 128/55 Pulse: 85   Resp: 16 SpO2: 92 % O2 Device: None (Room air)    Vitals:    04/22/22 0551   Weight: 57.5 kg (126 lb 12.8 oz)     I/O last 3 completed shifts:  In: 1484 [P.O.:180; I.V.:1304]  Out: 1725 [Urine:1725]          GENERAL:  Comfortable.   PSYCH: pleasant, oriented, No acute distress.  EYES: PERRLA, Normal conjunctiva.  HEART:  Normal S1, S2 with no edema.  LUNGS:  Clear to auscultation, normal Respiratory effort.  ABDOMEN:  Soft, no hepatosplenomegaly, normal bowel sounds.  SKIN:  Dry to touch, No rash.      Medications     sodium chloride 75 mL/hr at 04/24/22 1155       acetaminophen  975 mg Oral Q8H     amLODIPine  5 mg Oral Daily     apixaban ANTICOAGULANT  2.5 mg Oral BID     azaTHIOprine  50 mg Oral At Bedtime     cefTRIAXone  1 g Intravenous Q24H     cycloSPORINE modified  25 mg Oral BID     docusate sodium  100 mg Oral BID     metoprolol succinate ER  25 mg Oral Daily     polyethylene glycol  17 g Oral Daily     senna-docusate  1 tablet Oral BID     sodium bicarbonate  650 mg Oral TID     sodium chloride (PF)  3 mL Intracatheter Q8H     sodium chloride (PF)  3 mL Intracatheter Q8H     cholecalciferol  50 mcg Oral Daily     Data     -Data reviewed today:  I personally reviewed  all new labs and imaging results over the last 24 hours.    Recent Labs   Lab 04/24/22  0612 04/23/22  0706 04/22/22  0509   WBC 18.3* 22.9* 24.0*   HGB 7.5* 7.3* 8.4*   HCT 24.5* 23.5* 27.4*    102* 100    238 264     Recent Labs   Lab 04/24/22  0612 04/23/22  0706 04/22/22  0509   * 128* 135   POTASSIUM 4.2 4.4 4.6   CHLORIDE 103 103 108   CO2 16* 17* 20   ANIONGAP 10 8 7   *  108* 113*   BUN 63* 61* 56*   CR 3.95* 3.95* 3.66*   GFRESTIMATED 11* 11* 12*   LETI 8.0* 8.8 8.6       No results found for this or any previous visit (from the past 24 hour(s)).    This document was produced using voice recognition software

## 2022-04-24 NOTE — PROGRESS NOTES
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 She is having larger volume post void residuals. Unable to do allograft ultrasound here at [facility].  -Will discontinue IVF.  -Rx UTI per primary team  -With high PVR will place urethral catheter.     <Probable UTI  ~Culture positive for GNR  -On ceftriaxone. Could probably to oral.     <Hyponatremia  ~Likely renal failure +fluids vs. SIADH. Not advisable to fluid restrict given VERONIQUE. Since does have low total CO2, can treat and that will provide substantial sodium. UOsm 195 which is non-diagnostic (This may represent maximal dilution in the setting of poor kidney function or slightly inappropriately elevated.)  -Discontinued IVF  -On sodium bicarbonate.        <Non anion gap acidosis  ~Likely related to CKD  -PO sodium bicarbonate.     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                            -Iron panel ordered                           -May benefit from JAMEL  Lee Villanueva MD  [facility], Nephrology  Cell:[phone]  Pager:[phone]     Securely message with the Vocera Web Console (learn more here)  Text page via [employer] Paging/Directory            /\/\/\/\/\/\/\/\/\/\/\/\/\/\/\/\/\/\/\/\/\/\/\/\/\/\       <><><><><><><><><><><>    Interval History     Per RN is having high residuals on bladder scan. 450-500cc. Asymtpomatic.    Urine cultures growing GNR. No urinary symptoms.    Renal function remains poorer than baseline. No uremic symptoms still.     Continues to have some hyponatremia. Remains on 75mL/hour of IVF    Creatinine hanging about 4. Total CO2 16.       Temp: 97.5  F (36.4  C) Temp src: Temporal BP: 131/59 Pulse: 98   Resp: 16 SpO2: 96 % O2 Device: None (Room air)      Vitals:    04/22/22 0551   Weight: 57.5 kg (126 lb 12.8 oz)       10 point  ROS performed and negative except as noted above.    Vital Signs with Ranges    Temp:  [96.3  F (35.7  C)-97.5  F (36.4  C)] 97.5  F (36.4  C)  Pulse:  [] 98  Resp:  [14-16] 16  BP: (128-138)/(55-75) 131/59  SpO2:  [90 %-96 %] 96 %    I/O last 3 completed shifts:  In: 1611 [P.O.:430; I.V.:1181]  Out: 1025 [Urine:1025]    Physical Exam    BP Readings from Last 10 Encounters:   04/24/22 131/59   04/08/22 138/80   11/04/21 128/75   10/27/21 114/72   09/30/21 110/64   09/27/21 134/79   09/20/21 (!) 144/69   08/31/21 (!) 158/89   08/26/21 (!) 165/81   08/17/21 106/74        Wt Readings from Last 20 Encounters:   04/22/22 57.5 kg (126 lb 12.8 oz)   04/08/22 54 kg (119 lb)   11/04/21 54.7 kg (120 lb 9.6 oz)   10/27/21 53.7 kg (118 lb 6.4 oz)   09/30/21 55.1 kg (121 lb 8 oz)   09/27/21 55.8 kg (123 lb)   09/18/21 55.1 kg (121 lb 8 oz)   08/31/21 59.6 kg (131 lb 8 oz)   08/25/21 58.9 kg (129 lb 13.6 oz)   08/17/21 60.6 kg (133 lb 9.6 oz)   06/16/20 65.6 kg (144 lb 11.2 oz)   01/14/20 64.4 kg (142 lb)   09/25/19 65 kg (143 lb 6.4 oz)   06/12/19 64.1 kg (141 lb 4.8 oz)   10/25/18 66.4 kg (146 lb 6.4 oz)   03/14/18 68.3 kg (150 lb 8 oz)   12/19/17 67.1 kg (148 lb)   11/29/17 67.6 kg (149 lb 1.6 oz)   08/15/17 66.4 kg (146 lb 4.8 oz)   12/28/16 66.7 kg (147 lb)       GENERAL: Alert in NAD   HEENT:  Normocephalic. No gross abnormalities.  Pupils equal.  The mouth moist.    Neck The jugular venous pressure is not elevated.  CV: S1 S2  +systolic murmur. No rub.  RESP: Decreased breath sounds bilaterally with no audible crackle.   GI: Abdomen soft/nt/nd,   MUSCULOSKELETAL: extremities nl    No edema  SKIN: no new lesions or rashes, dry to touch.  NEURO:  Tends to speak tangentially. Focused on which TCU to pick.  PSYCH: Anxious, affect appropriate    Medications       sodium chloride 75 mL/hr at 04/24/22 1155         acetaminophen  975 mg Oral Q8H     amLODIPine  5 mg Oral Daily     apixaban ANTICOAGULANT  2.5 mg Oral BID      azaTHIOprine  50 mg Oral At Bedtime     cefTRIAXone  1 g Intravenous Q24H     cycloSPORINE modified  25 mg Oral BID     docusate sodium  100 mg Oral BID     metoprolol succinate ER  25 mg Oral Daily     polyethylene glycol  17 g Oral Daily     senna-docusate  1 tablet Oral BID     sodium bicarbonate  650 mg Oral TID     sodium chloride (PF)  3 mL Intracatheter Q8H     sodium chloride (PF)  3 mL Intracatheter Q8H     cholecalciferol  50 mcg Oral Daily       Data     UA RESULTS:  Recent Labs   Lab Test 04/23/22  0306 09/18/21  0229 08/31/21  1135   COLOR Orange*   < > Yellow   APPEARANCE Cloudy*   < > Clear   URINEGLC Negative   < > Negative   URINEBILI Negative   < > Negative   URINEKETONE Negative   < > Negative   SG 1.010   < > 1.020   UBLD Small*   < > Trace*   URINEPH 5.5   < > 6.0   PROTEIN 200 *   < > Trace*   UROBILINOGEN  --   --  0.2   NITRITE Negative   < > Negative   LEUKEST Large*   < > Small*   RBCU 18*   < > 0-2   WBCU >182*   < > 5-10*    < > = values in this interval not displayed.      BMP  Recent Labs   Lab 04/24/22  0612 04/23/22  0706 04/22/22  0509 04/21/22  0800   * 128* 135 138   POTASSIUM 4.2 4.4 4.6 3.7   CHLORIDE 103 103 108 112*   LETI 8.0* 8.8 8.6 7.5*   CO2 16* 17* 20 19*   BUN 63* 61* 56* 44*   CR 3.95* 3.95* 3.66* 2.71*   * 108* 113* 95     Phos@[email or web address](phos:4)  CBC)  Recent Labs   Lab 04/24/22  0612 04/23/22  0706 04/22/22  0509 04/21/22  0800   WBC 18.3* 22.9* 24.0* 13.5*   HGB 7.5* 7.3* 8.4* 8.2*   HCT 24.5* 23.5* 27.4* 27.2*    102* 100 102*    238 264 253     Lab Results   Component Value Date    ALBUMIN 3.1 09/17/2021    ALBUMIN 2.6 08/26/2021    ALBUMIN 2.7 08/25/2021    ALBUMIN 3.8 01/14/2020    ALBUMIN 3.8 08/15/2017    ALBUMIN 3.8 05/26/2017        Recent Labs   Lab 04/24/22  0612   HGB 7.5*   HCT 24.5*          No lab results found in last 7 days.    Invalid input(s): BILIRUBININDIRECT  Recent Labs   Lab 04/20/22  1416   INR 1.41*     25 OH  Vit D2   Date Value Ref Range Status   09/13/2016 <5 ug/L Final     25 OH Vit D3   Date Value Ref Range Status   09/13/2016 46 ug/L Final     25 OH Vit D total   Date Value Ref Range Status   09/13/2016  20 - 75 ug/L Final    <51  Season, race, dietary intake, and treatment affect the concentration of   25-hydroxy-Vitamin D. Values may decrease during winter months and increase   during summer months. Values 20-29 ug/L may indicate Vitamin D insufficiency   and values <20 ug/L may indicate Vitamin D deficiency.   This test was developed and its performance characteristics determined by the   [facility],  [facility]. It has   not been cleared or approved by the FDA. The laboratory is regulated under CLIA   as qualified to perform high-complexity testing. This test is used for clinical   purposes. It should not be regarded as investigational or for research.       No results for input(s): PTHI in the last 168 hours.    Attestation:   I have reviewed today's relevant vital signs, notes, medications, labs and imaging.    No therapy plan of the specified type found.

## 2022-04-24 NOTE — PLAN OF CARE
Goal Outcome Evaluation:    Plan of Care Reviewed With: patient          Outcome Evaluation: Able to transfer to [facility] with 2 Assist with GB/W. Voiding small amout of urine.    Patient vital signs are at baseline: Yes  Patient able to ambulate as they were prior to admission or with assist devices provided by therapies during their stay:  No,  Reason:  Assist x 2 BS using gait belt and walker  Patient MUST void prior to discharge:  Yes - but also having urinary retention. Straight cath done at 0500.  Patient able to tolerate oral intake:  Yes  Pain has adequate pain control using Oral analgesics:  Yes PO scheduled ylenod  Does patient have an identified :  No Reason: pt needing TCU  Has goal D/C date and time been discussed with patient:  Yes - TBD    Pt alert and oriented x 4. VSS. CMS intact. Dressing CDI

## 2022-04-24 NOTE — CONSULTS
Care Management Initial Consult    General Information  Assessment completed with: Patient, Kerrie, Cande  Type of CM/SW Visit: Initial Assessment    Primary Care Provider verified and updated as needed: Yes   Readmission within the last 30 days: no previous admission in last 30 days      Reason for Consult: discharge planning  Advance Care Planning:       General Information Comments: Lives independently at [facility] with herhusband    Communication Assessment  Patient's communication style: spoken language (English or Bilingual)    Hearing Difficulty or Deaf: no   Wear Glasses or Blind: yes    Cognitive  Cognitive/Neuro/Behavioral: .WDL except  Level of Consciousness: alert  Arousal Level: opens eyes spontaneously  Orientation: oriented x 4  Mood/Behavior: calm, cooperative  Best Language: 0 - No aphasia  Speech: clear    Living Environment:   People in home: spouse  Cam  Current living Arrangements: apartment, assisted living  [facility] of Facility: [facility]   Able to return to prior arrangements: no  Living Arrangement Comments: Will need a TCU per patient    Family/Social Support:  Care provided by: self  Provides care for: no one  Marital Status:   Children, Other (specify) (grandchildren)          Description of Support System: Supportive, Involved    Support Assessment: Adequate family and caregiver support    Current Resources:   Patient receiving home care services: No     Community Resources: None  Equipment currently used at home: walker, rolling  Supplies currently used at home: None    Employment/Financial:  Employment Status:          Financial Concerns: No concerns identified   Referral to Financial Worker: No       Lifestyle & Psychosocial Needs:  Social Determinants of Health     Tobacco Use: Medium Risk     Smoking Tobacco Use: Former Smoker     Smokeless Tobacco Use: Former User   Alcohol Use: Not on file   Financial Resource Strain: Not on file   Food Insecurity: Not on file    Transportation Needs: Not on file   Physical Activity: Not on file   Stress: Not on file   Social Connections: Not on file   Intimate Partner Violence: Not on file   Depression: At risk     PHQ-2 Score: 3   Housing Stability: Not on file       Functional Status:  Prior to admission patient needed assistance:   Dependent ADLs:: Ambulation-walker, Bathing, Dressing, Eating, Grooming, Incontinence, Positioning, Transfers  Dependent IADLs:: Cleaning, Cooking, Laundry, Meal Preparation, Medication Management  Assesssment of Functional Status: Not at baseline with ADL Functioning, Not at baseline with mobility    Mental Health Status:  Mental Health Status: No Current Concerns       Chemical Dependency Status:  Chemical Dependency Status: No Current Concerns             Values/Beliefs:  Spiritual, Cultural Beliefs, Temple Practices, Values that affect care: no               Additional Information:  Met with patient, she was alert and oriented x3, able to verbalize her thoughts and needs appropriately and is able to make own decisions. We discussed discharge planning. She reported that she has talked to her friends that have through a fractured hip. She realizes that she will net be able to return home and will need TCU.  Discussed the Medicare Compare list for SNF.  Discussed associated Medicare star ratings to assist with choice for referrals/discharge planning; Yes    Education was given to pt/family that star ratings are updated/maintained by Medicare and can be reviewed by visiting www.medicare.gov; Yes. Patient has talked with her friends and she wants referrals sent to [facility] and [facility]. Referrals sent.    MARY will assist with discharge planning.    TIERRA Arango   Inpatient Care Coordination   Supervisor  [facility]  [phone]      TIERRA Stevens

## 2022-04-25 NOTE — PROGRESS NOTES
Orthopedic Surgery  Cande Baird  2022  Admit Date:  2022  POD: 3 Days Post-Op   Procedure(s):  Left hip cephalomedullary nail    Alert and oriented.   Patient resting comfortably in chair.    Pain controlled.  Tolerating oral intake.    Denies nausea or vomiting  Denies chest pain or shortness of breath    Vital Sign Ranges  Temperature Temp  Av.9  F (36.6  C)  Min: 97.5  F (36.4  C)  Max: 98.2  F (36.8  C)   Blood pressure Systolic (24hrs), Av , Min:112 , Max:134        Diastolic (24hrs), Av, Min:59, Max:70      Pulse Pulse  Av.5  Min: 87  Max: 107   Respirations Resp  Av  Min: 16  Max: 16   Pulse oximetry SpO2  Av %  Min: 92 %  Max: 97 %       Dressing is clean, dry, and intact.   Minimal erythema of the surrounding skin.   Bilateral calves are soft, non-tender.  Left lower extremity is NVI.  Sensation intact bilateral lower extremities  Patient able to resist dorsi and plantar flexion bilaterally  +Dp pulse    Labs:  Recent Labs   Lab Test 22  0612 22  0706 22  0509   WBC 18.3* 22.9* 24.0*     Recent Labs   Lab Test 22  0612 22  0706 22  0509   HGB 7.5* 7.3* 8.4*     Recent Labs   Lab Test 22  1416 21  2124 21  0803   INR 1.41* 0.99 1.18*     Recent Labs   Lab Test 22  0612 22  0706 22  0509    238 264       A/P  1. S/p ORIF left IT fx.               Continue Eliquis for DVT prophylaxis.                Mobilize with PT/OT WBAT.                Leave dressing undisturbed for a week. Ok to shower with dressing in place.              Continue current pain regimen.     2. Disposition              Anticipate d/c to TCU pending progress with PT and medical stability.    Maile Mendoza PA-C

## 2022-04-25 NOTE — PROGRESS NOTES
[facility]  Hospitalist Progress Note  Admit 4/20/2022 11:47 AM    Name: Cande Baird    MRN: [id number]  Provider: Lucius Sandra MD    Date of Service: 04/25/2022     Reason for Stay (Diagnosis): Intratrochanteric hip fracture         Summary of hospital stay & Assessment/Plan:   Summary of Stay: Cande Baird is a 86 year old female who was admitted on 4/20/2022    86 year old female with past medical history of afib on chronic anticoagulation, CKD stage 3b s/p kidney transplant on immunosuppression, HTN who resides in a senior living facility who was admitted on 4/20/2022 after accidentally falling off of her bed and landing on her side and was found to have left intertrochanteric hip fracture.  Patient underwent surgical repair today.     Problem List/Assessment and Plan:      Mechanical fall with left intertrochanteric hip fracture status postsurgical repair on 4/22: Patient fell when she was sitting on her bed (mechanical in nature).  Found to have intertrochanteric fracture which was repaired by orthopedics on 4/22 she had open reduction and internal fixation.  -Orthopedics following   -Pain control with tramadol, Tylenol or IV Dilaudid for severe pain  -PT/OT/social work consults  -She was restarted on lower dose of Eliquis especially with worsening kidney function as recommended by cardiology     Paroxysmal atrial fibrillation and chronic diastolic CHF: Patient follows with Dr. Reynaga.  Patient had a Gisel scan in 10/2021 without any ischemia.  She is on Toprol 100 mg twice daily as well as Eliquis 2.5 mg twice daily for rate control and prophylaxis as well as amlodipine and Lasix.  Telemetry has shown episodes of atrial fibrillation with high-grade block and episodes of 2-1 block.  Cardiology was consulted in the preoperative setting.  It was recommended to hold her beta-blocker given 2-1 heart block and observe on telemetry.  Should she not improve consideration would be need to be given to  possible sick sinus syndrome and PPM.  -Cardiology consulted, appreciate recommendations  -Continue to hold beta-blocker, heart rate is reasonably controlled     Acute kidney injury with CKD stage IIIb and prior kidney transplant on immunosuppression: Creatinine was 2.6 in 1/2022, 3 in 2/2022 and 3.1 on 4/5/2022.  Her recent baseline seems to be about 3.  She follows with [facility].  She was last seen in nephrology on 2/24/2022.  At that time she was found to have a UTI and treated with Keflex. She was noticing lower extremity edema and was started on Lasix 20 mg daily earlier this month.  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 Creatinine on admission was 3.31, today has increased slightly to 3.95.  Possibly UTI is contributing  -Continue to hold Lasix  --Nephrology following and discontinued IV fluids on 4/24  -Continue PTA immunosuppressive regimen including azathioprine 50 mg nightly and cyclosporine 25 mg twice daily  -BMP in the morning  -Avoid nephrotoxic agents     Acute on chronic anemia: Baseline hemoglobin approximately 11.  Hemoglobin was 10 on admission and decreased to 8.4 today.  This is in the setting of acute fracture and surgery.  No indication for transfusion now but if she decreases to 7 or less certainly would require this.  -CBC in the morning     Probable urinary tract infection, significant leukocytosis: WBC elevated 13.5 on admission, has increased to 24 on 4/22.  She is afebrile.   -Has recent history of UTI.  -Urine culture growing E.coli sensitive to ceftriaxone.   -Will continue IV Ceftriaxone for today as she has significant nausea. Will switch to po antibiotic tomorrow     Hypertension: Prior to admission the patient was on metoprolol, Lasix and amlodipine.  As above metoprolol is on hold.  Holding Lasix as well.  -Continue amlodipine    DVT Prophylaxis: DOAC  Code Status:  Full Code    Disposition Plan     Expected discharge in 1-2  days to  transitional care unit once nausea resolved,  kidney function and leukocytosis improved     Entered: Lucius Sandra MD 04/25/2022, 11:19 AM           Interval History:   Patient seen and examined.  She states that she is feeling better.  She has no fever.  Has some urinary retention. Denies cough or shortness of breath.  No chest pain.               Physical Exam:   Physical Exam   Temp: 97.8  F (36.6  C) Temp src: Temporal BP: 132/70 Pulse: 106   Resp: 16 SpO2: 95 % O2 Device: None (Room air)    Vitals:    04/22/22 0551   Weight: 57.5 kg (126 lb 12.8 oz)     I/O last 3 completed shifts:  In: 450 [P.O.:450]  Out: 1400 [Urine:1400]          GENERAL:  Comfortable.   PSYCH: pleasant, oriented, No acute distress.  EYES: PERRLA, Normal conjunctiva.  HEART:  Normal S1, S2 with no edema.  LUNGS:  Clear to auscultation, normal Respiratory effort.  ABDOMEN:  Soft, no hepatosplenomegaly, normal bowel sounds.  SKIN:  Dry to touch, No rash.      Medications       acetaminophen  975 mg Oral Q8H     amLODIPine  5 mg Oral Daily     apixaban ANTICOAGULANT  2.5 mg Oral BID     azaTHIOprine  50 mg Oral At Bedtime     cefTRIAXone  1 g Intravenous Q24H     cycloSPORINE modified  25 mg Oral BID     docusate sodium  100 mg Oral BID     metoprolol succinate ER  25 mg Oral Daily     polyethylene glycol  17 g Oral Daily     senna-docusate  1 tablet Oral BID     sodium bicarbonate  650 mg Oral TID     sodium chloride (PF)  3 mL Intracatheter Q8H     sodium chloride (PF)  3 mL Intracatheter Q8H     cholecalciferol  50 mcg Oral Daily     Data     -Data reviewed today:  I personally reviewed  all new labs and imaging results over the last 24 hours.    Recent Labs   Lab 04/24/22  0612 04/23/22  0706 04/22/22  0509   WBC 18.3* 22.9* 24.0*   HGB 7.5* 7.3* 8.4*   HCT 24.5* 23.5* 27.4*    102* 100    238 264     Recent Labs   Lab 04/25/22  1015 04/24/22  0612 04/23/22  0706   * 129* 128*   POTASSIUM 4.4 4.2 4.4    CHLORIDE 104 103 103   CO2 18* 16* 17*   ANIONGAP 7 10 8   * 100* 108*   BUN 63* 63* 61*   CR 3.98* 3.95* 3.95*   GFRESTIMATED 11* 11* 11*   LETI 8.5 8.0* 8.8       No results found for this or any previous visit (from the past 24 hour(s)).    This document was produced using voice recognition software

## 2022-04-25 NOTE — PROGRESS NOTES
Care Management Follow Up    Length of Stay (days): 5    Expected Discharge Date: 04/25/2022     Concerns to be Addressed: discharge planning  Patient plan of care discussed at interdisciplinary rounds: Yes    Anticipated Discharge Disposition: TCU     Anticipated Discharge Services:    Anticipated Discharge DME:      Patient/family educated on Medicare website which has current facility and service quality ratings:  yes  Education Provided on the Discharge Plan:  yes  Patient/Family in Agreement with the Plan: yes    Referrals Placed by CM/SW:  Skilled nursing facilities  Private pay costs discussed: Not applicable    Additional Information:  Reviewed pt's status and discussed in rounds. SW following for TCU placement.     Sw placed follow up calls to the following facilties with pending referrals:    -[facility] - Left  for admissions requesting return call  -[facility] - Spoke with Nneka who stated they may have a bed available tomorrow so she will review and give return call  -[facility] - referral was sent to the [facility], SW re-faxed referral to the TCU    Social work will continue to follow and assist with discharge planning as needed.    ALEJANDRA Andrade, LSW  Inpatient Care Coordination  [facility]  [phone]    ALEJANDRA Lund

## 2022-04-25 NOTE — PLAN OF CARE
"BP (!) 142/86 (BP Location: Left arm, Patient Position: Sitting, Cuff Size: Adult Regular)   Pulse 97   Temp 98.1  F (36.7  C) (Temporal)   Resp 16   Ht 1.524 m (5')   Wt 57.5 kg (126 lb 12.8 oz)   SpO2 95%   BMI 24.76 kg/m    Neuro: alert and oriented x4- forgetful  Cardiac: hx of afib - Tele on afib 100s   Lungs: WDL  GI: WDL - reports last bowel movement being a couple of days ago  : olmstead catheter in place  Pain: denies pain this evening   IV: SL.   Meds: refusing all meds tonight   Labs/tests: lactic 0.8, creatinine 3.98  Diet: regular diet - pt reporting a decrease in appetite   Activity: assist x2 walker and gb - pt has not been out of bed this evening   Misc:   Plan: TCU-waiting for placement. SW following      Pt appears sad in room. Stated that she \"just wants to die\", when asked why that is she stated that she feels like she lived a pretty good life, her daughter takes good care of her, and that her  is \"domineering\". She states she is [employer], but her  is not and he argues about [employer] with her many times. She states she is very tired today and just wants to sleep and says its from all the medications she has been taking while here. Asked pt if she would like to speak with spiritual services but declined saying she already spoke to someone a couple days ago. Pt refused all medications tonight says \"I don't want to take anything that might keep me alive.\" Continuing to monitor and provide cares that pt allows.   "

## 2022-04-25 NOTE — CONSULTS
"CLINICAL NUTRITION SERVICES  -  ASSESSMENT NOTE      Recommendations:   Diet per MD.  Encouraged self-selection of high calorie/high protein and small/frequent meals or snacks as needed.    Declined oral supplement offering.     MALNUTRITION:  % Weight Loss:  Weight loss does not meet criteria for malnutrition   % Intake:  Decreased intake does not meet criteria for malnutrition --> during admit, difficult to determine if ongoing PTA as well  Subcutaneous Fat Loss:  Unable to complete with RD offiste  Muscle Loss: Unable to complete with RD offiste  Fluid Retention: Trace to mild, LEs --> above UBW but also may be masking true wt changes    Malnutrition Diagnosis: Unable to determine due to RD off-site, lack of NFPE          REASON FOR ASSESSMENT  Cande Baird is a 86 year old female seen by Registered Dietitian for Admission Nutrition Risk Screen for positive.    PMH of: CKD stage 3 s/p kidney transplant, HTN, CHF.    Admit 2/2: L hip fracture s/p ORIF 4/22, also found to have UTI.    NUTRITION HISTORY  - Information obtained from patient via phone as RD offsite and chart.  - Diet at home: Regular.  - Usual intakes: Meals TID.  Describes breakfast as coffee, cookie and fruit, lunch and dinner vary greatly.  Notes she has \"whatever my  wants, he's picky\".  - Barriers to PO intakes: At first denies skipping of meals or smaller portions.  Then reports she's had a slow decline in appetite over the past ~1 year.   - Use of oral supplements: None, would never consider.  \"I hate that crap\".  Notes her  drinks Ensure.  - Chewing/swallowing issues: Denies.  - Meal preparation/grocery shopping: Relies on daughter for groceries, orders out, or has meals available for purchase within senior living facility.  - Allergies: NKFA.      CURRENT NUTRITION ORDERS  Diet Order:     Regular    Current Intake/Tolerance:  % intakes based on flowsheet review.  Is consistently ordering meals when not NPO for a procedure.  " "      NUTRITION FOCUSED PHYSICAL ASSESSMENT FOR DIAGNOSING MALNUTRITION)  No, RD off-site    Obtained from Chart/Interdisciplinary Team:  - No documentation of PI  - Stooling patterns reviewed    ANTHROPOMETRICS  Height: 5' 0\"  Weight: 126 lbs 12.8 oz  Body mass index is 24.76 kg/m .  Weight Status:  Normal BMI  Weight History:  Wt Readings from Last 10 Encounters:   04/22/22 57.5 kg (126 lb 12.8 oz)   04/08/22 54 kg (119 lb)   11/04/21 54.7 kg (120 lb 9.6 oz)   10/27/21 53.7 kg (118 lb 6.4 oz)   09/30/21 55.1 kg (121 lb 8 oz)   09/27/21 55.8 kg (123 lb)   09/18/21 55.1 kg (121 lb 8 oz)   08/31/21 59.6 kg (131 lb 8 oz)   08/25/21 58.9 kg (129 lb 13.6 oz)   08/17/21 60.6 kg (133 lb 9.6 oz)     - Currently with trace to mild, LE edema.  Seems above UBW.  - Patient herself feels like she has been weighing between 115-120# more recently.  She describes a \"40 pound\" weight loss in the past \"year\" but also notes her weight prior to the loss was \"140\".    - Above wt trends indicate wt has been fluctuating around 120# +/- 5# over the past at least 6 months.  Possibility of slight shifts or fluid component.      LABS  Labs reviewed:  Electrolytes  Potassium (mmol/L)   Date Value   04/24/2022 4.2   04/23/2022 4.4   04/22/2022 4.6   01/14/2020 4.4   02/25/2019 4.5   08/15/2017 4.3     Phosphorus (mg/dL)   Date Value   04/21/2022 4.9 (H)   09/13/2016 3.0    Blood Glucose  Glucose (mg/dL)   Date Value   04/24/2022 100 (H)   04/23/2022 108 (H)   04/22/2022 113 (H)   04/21/2022 95   04/20/2022 108 (H)   01/14/2020 105 (H)   02/25/2019 129 (H)   08/15/2017 103 (H)   05/26/2017 105 (H)   12/20/2016 126 (H)    Inflammatory Markers  CRP Inflammation (mg/L)   Date Value   12/20/2016 33.0 (H)     WBC (10e9/L)   Date Value   01/14/2020 9.1   02/25/2019 17.1 (H)   11/29/2017 12.7 (H)     WBC Count (10e3/uL)   Date Value   04/24/2022 18.3 (H)   04/23/2022 22.9 (H)   04/22/2022 24.0 (H)     Albumin (g/dL)   Date Value   09/17/2021 3.1 (L) "   08/26/2021 2.6 (L)   08/25/2021 2.7 (L)   01/14/2020 3.8   08/15/2017 3.8   05/26/2017 3.8      Magnesium (mg/dL)   Date Value   04/21/2022 1.8   08/25/2021 2.0   08/09/2021 2.0     Sodium (mmol/L)   Date Value   04/24/2022 129 (L)   04/23/2022 128 (L)   04/22/2022 135   01/14/2020 132 (L)   02/25/2019 136   08/15/2017 134    Renal  Urea Nitrogen (mg/dL)   Date Value   04/24/2022 63 (H)   04/23/2022 61 (H)   04/22/2022 56 (H)   01/14/2020 15   02/25/2019 19   08/15/2017 18     Creatinine (mg/dL)   Date Value   04/24/2022 3.95 (H)   04/23/2022 3.95 (H)   04/22/2022 3.66 (H)   01/14/2020 0.79   02/25/2019 0.86   08/15/2017 0.93     Additional  Triglycerides (mg/dL)   Date Value   08/15/2017 139   05/26/2017 131   05/27/2016 108     Ketones Urine (mg/dL)   Date Value   04/23/2022 Negative   01/14/2020 Negative        B/P: 132/70, T: 97.8, P: 106, R: 16      MEDICATIONS  Medications reviewed:    acetaminophen  975 mg Oral Q8H     amLODIPine  5 mg Oral Daily     apixaban ANTICOAGULANT  2.5 mg Oral BID     azaTHIOprine  50 mg Oral At Bedtime     cefTRIAXone  1 g Intravenous Q24H     cycloSPORINE modified  25 mg Oral BID     docusate sodium  100 mg Oral BID     metoprolol succinate ER  25 mg Oral Daily     polyethylene glycol  17 g Oral Daily     senna-docusate  1 tablet Oral BID     sodium bicarbonate  650 mg Oral TID     sodium chloride (PF)  3 mL Intracatheter Q8H     sodium chloride (PF)  3 mL Intracatheter Q8H     cholecalciferol  50 mcg Oral Daily             ASSESSED NUTRITION NEEDS PER APPROVED PRACTICE GUIDELINES:    Dosing Weight 54 kg - dry wt?  Estimated Energy Needs: 25-30+ Kcal/Kg  Justification: minimum maintenance  Estimated Protein Needs: >/=1.2 g pro/Kg  Justification: preservation of lean body mass, suspect repletion needs, advanced age  Estimated Fluid Needs: per MD      NUTRITION DIAGNOSIS:  Inadequate oral intake related to suspect acute on chronic decreased appetite as evidenced by meeting <75%  needs since admit and possibility of occurring for months based on report and diet recall.    NUTRITION INTERVENTIONS  Recommendations / Nutrition Prescription  Diet per MD.  Encouraged self-selection of high calorie/high protein and small/frequent meals or snacks as needed.    Declined oral supplement offering.      Implementation  Nutrition education: Provided education on above.  Reviewed protein sources from food.  Encouraged consistent ordering throughout the day and eating whatever sounds good in the moment to combat decreased appetite.  Discussed available supplement options though she doesn't feel these are a good fit.    Nutrition Goals  Patient to consume at least 50% of meals TID to show improvement in PO intakes.      MONITORING AND EVALUATION:  Progress towards goals will be monitored and evaluated per protocol and Practice Guidelines          Elisabeth Aguilar RDN, LD  Clinical Dietitian  3rd floor/ICU: [phone]  All other floors: [phone]  Weekend/holiday: [phone]  Office: [phone]

## 2022-04-25 NOTE — PLAN OF CARE
Goal Outcome Evaluation:    Plan of Care Reviewed With: patient     Overall Patient Progress: improving       Alert and oriented x4. Forgetful and confused at times. Easily reoriented. Calm and cooperative. VVS. O2 95% on RA. Tele on -hx of afib. IV SL. Sharon any pain. Voiding well via olmstead catheter. Hip dressing CDI. Patient was up in chair for most of the morning. Returned to bed to rest. Patient not eating well, did enjoy egg salad without the bread.    After nephrology doctor came by and patient's mood seemed to have shifted. She was starting to say she has had a good life and is not interested in medical interventions to keep her alive. She was at the time still agreeable to taking medications.

## 2022-04-25 NOTE — PROGRESS NOTES
Renal Medicine Progress Note                                Cande Baird MRN# [id number]   Age: 86 year old YOB: 1935   Date of Admission: 4/20/2022 Hospital LOS: 5                  Assessment/Plan:     1.  ESRD   -previous dialysis requirement   -DDKT 1987 [facility]  2.  CKD stage 4   -creatinine 3 range   -eGFR 15 to 20 ml/min   -follows with Dr. Díaz of  Nephrology  3.  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  Hyponatremia   -low Uosm   -should correct with NS if needed    No IVF   Follow labs  Repeat urine studies        Interval History:     Up in chair  IO reviewed  Increasing UO    Awake   Follows     Reviewed  by phone       ROS:     GENERAL: NAD, No fever,chills  R: NEGATIVE for significant cough or SOB  CV: NEGATIVE for chest pain, palpitations  EXT: no change edema  ROS otherwise negative    Medications and Allergies:     Reviewed    Physical Exam:     Vitals were reviewed  Patient Vitals for the past 8 hrs:   BP Temp Temp src Pulse Resp SpO2   04/25/22 0755 132/70 97.8  F (36.6  C) Temporal 106 16 95 %     I/O last 3 completed shifts:  In: 450 [P.O.:450]  Out: 1400 [Urine:1400]    Vitals:    04/22/22 0551   Weight: 57.5 kg (126 lb 12.8 oz)         GENERAL: awake, alert, follows  HEENT: NC/AT, PERRLA, EOMI, non icteric, pharynx moist without lesion  RESP:  clear anteriorly  CV: RRR, normal S1 S2  ABDOMEN: soft, nontender, no HSM or masses and bowel sounds normal  MS: no clubbing, cyanosis   NEURO: speech normal and cranial nerves 2-12 intact  PSYCH: affect normal/bright  EXT: warm, no edema    Data:     Recent Labs   Lab 04/25/22  1015 04/24/22  0612 04/23/22  0706 04/22/22  0509   * 129* 128* 135   POTASSIUM 4.4 4.2 4.4 4.6   CHLORIDE 104 103 103 108   CO2 18* 16* 17* 20   ANIONGAP 7 10 8 7   * 100* 108* 113*   BUN 63* 63* 61* 56*   CR 3.98* 3.95* 3.95* 3.66*   GFRESTIMATED 11* 11* 11* 12*   LETI 8.5  8.0* 8.8 8.6         No lab results found in last 7 days.  Recent Labs   Lab 04/23/22  0306   COLOR Orange*   APPEARANCE Cloudy*   URINEGLC Negative   URINEBILI Negative   URINEKETONE Negative   SG 1.010   UBLD Small*   URINEPH 5.5   PROTEIN 200 *   NITRITE Negative   LEUKEST Large*   RBCU 18*   WBCU >182*     Recent Labs   Lab 04/23/22  2117   UROSMOLALITY 195         G Jose Scott MD    [facility] - Nephrology  [phone]

## 2022-04-25 NOTE — PLAN OF CARE
Ax2 with walker and gait belt. A&Ox4 but forgetful.  VSS. 02 - 97% on RA. LS - clear, diminished at bases.  Tele report - Afib, 80's.  IV - SL. Denies pain. Voiding adequately via olmstead catheter. Hip dressing CD&I.  Pt slept well during the shift.      electronic

## 2022-04-26 NOTE — PROGRESS NOTES
Care Management Follow Up    Length of Stay (days): 6    Expected Discharge Date: 04/27/2022     Concerns to be Addressed: discharge planning  Patient plan of care discussed at interdisciplinary rounds: Yes    Anticipated Discharge Disposition:     Anticipated Discharge Services:  Hospice  Anticipated Discharge DME:      Education Provided on the Discharge Plan:  yes  Patient/Family in Agreement with the Plan: yes    Referrals Placed by CM/SW:    Private pay costs discussed: Not applicable    Additional Information:  Reviewed pt's status and discussed in rounds. SW following for TCU placement however pt now meeting with palliative care today.     Updated by palliative care that plan is now for pt to discharge with hospice support. SW met with pt this date and introduced self and social work role. Pt was accompanied by her dtr Juanita. Pt/Juanita would like pt to return to [facility] with hospice support. Juanita expressed concern as she does not know what this would look like as pt is in the independent living. They would also like to know which hospice agency [facility] prefers.     MARY placed phone call and spoke to [place] Adan p:[phone] at [facility]. Per Antionette, if pt is an assist of 2 then they would not be able to accommodate her in her ILF apartment, pt would need to be in their care suites. SW questioned if they have availability in their care suites and Antionette said no. They do however have an available unit in the memory care that pt could potentially move into. Antionette stated they work very closely with [place] hospice agency. Per Antionette, she does not work tomorrow or Friday so if pt were to return with the appropriate services and hospice arranged, the pt would only be able to return on Thursday.     SW to follow up with pt/Juanita tomorrow to discuss above information and to help assist with discharge planning.     Social work will continue to follow and assist with discharge planning as needed.    Phuong  ALEJANDRA Hagen, LSW  Inpatient Care Coordination  [facility]  [phone]    ALEJANDRA Lund

## 2022-04-26 NOTE — PROGRESS NOTES
"This writer in to see pt for assessment and rounds. Pt very withdrawn, quiet. Talking with pt, pt has made several concerning statements. Pt states \" I don't want to live anymore.\" \" Why can't you just give me something to put me to sleep forever?\" \"How long does it take to die?\" much emotional support given, reassurances and active listening. pt denies wanting to hurt herself, just wants to fade away.  Pt commented that she could not even get up and walk. Questioned further, pt states is to painful to get up, offered pain medications, pt declined. Pt with emesis bag near her, states she vomits a lot, offered nausea medications, pt declined. Pt states she is not going to eat or drink anything, pt also states is not going to take any more medication. Pt again reiterates that she wants to die and \" my  is in agreement.\" pt states that her  has \"done a lot of research\" into this and is aware that we cannot just give her something to sleep. Again offered a , pt declined. Talked with pt at length about a possible palliative care conference tomorrow, discussed that then her goals of care could be voiced to the doctors and we could hopefully get all on the same page. Pt was very receptive to this idea. Pt is at this time a FULL CODE. Will continue to provide what supportive cares to patient as she allows.   "

## 2022-04-26 NOTE — PLAN OF CARE
"Goal Outcome Evaluation:           Pt appears alert and oriented, refuses any activity out of the bed. Pt continues to refuse all medications including IV antibiotics. \"I don't want to take anything that will help keep me alive\". Emotional support given. Will page the am hospitalist to come assess and ? Order a palliative consult.            "

## 2022-04-26 NOTE — PLAN OF CARE
"A&OX4. VSS. Refusing cares, meds and food+fluids. Palliative consult. Discharge to TCU vs back to previous living arrangement with palliative/hospice. She has been making consistent statements today \"I had a good 86 years, I am done\"\" \"I am not going to keep taking medications to make me live\".   "

## 2022-04-26 NOTE — CONSULTS
[facility]  Palliative Care Consultation   Text Page    Assessment & Plan   Cande Baird is a 86 year old female who was admitted on 4/20/2022.   Consulted by Dr. Sandra to assist with goals of care and development of plan of care    Recommendations:  1. Goals of Care- No CPR- Do NOT Intubate  Hospitalization goals discussed with patient and daughter at the bedside.    Reviewed current hospitalization and co-morbidities.  Reviewed patient's goals for comfort and hospice enrollment. Reviewed current medical treatments and risk/benefit of continuing vs discontinuing current care measures.  Stated goal for comfort. Side effects of current medications are not in line with her wishes. Declines to take medications and plan is to discontinue all non-comfort medications and change code status to DNR/ do NOT Intubate.     Decisional Capacity- Intact. Patient has an advance directive dated 04/13/2017.  If patient becomes unable to demonstrate decisional capacity, Cristofer Baird is the primary Health Care Agent.  Alternates are Juanitavazquez Green (daughter).   POLST none on file    - facilitate discharge to facility on hospice, working with  to determine facility and hospice agency.  - DNR/DNI  - Completion of POLST form prior to discharge      2. Pain   S/P mechanical fall with Left hip fracture and subsequent Hip ORIF on 4/22/2022  Patient's opioid use in past 24 hours: 0 = 0mg Daily Morphine Equivalent  [place] Board of Pharmacy Data Base Reviewed:    YES; As expected, no concern for misuse/abuse of controlled medications based on this report.    - acetaminophen 650 mg every 4 hours prn  - hydromorphone 0.2 mg IV every 2 hours prn  - tramadol 25 mg every 6 hours prn  - Ice/Heat prn    3. End stage Renal Disease  History of kidney transplant in 1987, now with worsening CKD and nephrology is following for recommendations.  Patient refuses to pursue dialysis if needed.   - medication de-escalation  "per patient's goals of care  - comfort focused plan of care.      4. Nausea/Vomiting  Significant nausea with any PO intake, gags on medications/pills.    - Zofran prn every 6 hours nausea.  - compazine as second line antiemetic agent  - lorazepam as third line antiemetic agent  - consider scheduled antiemetic for comfort    5. Spiritual Care  Oriented to Spiritual Health as part of Palliative Care team. Appreciate Care of  Harry Chung.  New consult placed due to change in plan of care.  Spiritual Background: Voodoo    5. Care Planning  Appreciate Care of Multidisciplinary team.  - discharge to facility on hospice  - medications at discharge per hospice agency preference.     Medical Decision Making and Goals of Care:  Discussed on April 26, 2022 with Erica Nieves APRN, CNP:   Met with Cande and daughter Juanita at the bedside.  Reviewed Palliative care specialty and purpose of the consult.  Cande reviewed her recent and remote medical history.  Reviewed her life and psychosocial aspects of her life and marriage to Cristofer.  Discussed the support that Juanita provides her at this point.     Inquired about her understanding of the doctor's impressions of her health and kidney function.  Cande stated that she has progressive kidney failure and that she anticipates her kidney will \"give up\" in the near future.  She states that she is aware of this health decline and that she does not want any measures or medications to interfere with her natural end of life.  She stated that she knows God has a plan for her life and death.  She reflected on the question of why God had kept her alive as long as he has and that she hopes to find the answer after death.      Discussed current plan of restorative intent and alternate plan of comfort and hospice enrollment.  Cande stated that she wants to know more about hospice and comfort plan.  Reviewed hospice benefit and services included.  Deferred to social work for " further enrollment process details.  Emphasized that hospice philosophy is to decreased symptom burden, maintain quality of life outside of the hospital and help a person transition to end of life.  Cande verbalized understanding and agreed that the hospice plan of care is her choice.      Reviewed her current medication regimen and her wish to discontinue medications.  Cande described her difficulty with medications and her frequent bouts of nausea with taking them.  Discussed risks of discontinuing medications including kidney rejection, blood clot, high blood pressure, etc.  She verbalized understanding and inquired about what would be done to keep her comfortable if those things occurred.  Discussed hospice medications and goal of comfort in the event of pain, nausea, dyspnea or anxiety.  She stated that she wished to discontinue all medications and focus on eating what she could comfortably and avoiding nausea.  Reviewed interventions including lab work, cardiac monitor and VS.  Cande requested that all of these be discontinued.      Juanita inquired about physical rehabilitation at discharge and if that would happen on hospice.  Reviewed the goal of rehab, the time involved and the larger goals of care.  Cande stated that she did not want to go through a process of rehab, only to have her kidney fail and her death happen shortly afterward.  Discussed the ability to mobilize while on hospice with assistive equipment.  Cande and Juanita verbalized understanding and agreed to the plan of hospice without rehab stay.  Encouraged Cande to mobilize with assist while in the hospital to optimize function at discharge.    Reviewed code status and health care directive documents on file.  Cande stated that she did not want resuscitation or intubation in the event of a life ending event.  Changed order in the chart due to this wish.  Cande discussed that she and Cristofer both feel that things should not be done to prolong life and that  a natural death is preferable.      Shayne denied further questions or concerns.  Planned to return for further conversation when Cristofer is at the bedside tomorrow.    Thank you for involving us in the patient's care.     Erica LUBIN CNP  Pain Management and Palliative Care  [facility]  Pgr: [phone]    Time Spent on this Encounter   Total unit/floor time 112 minutes, time consisted of the following, examination of the patient, reviewing the record and completing documentation. >50% of time spent in counseling and coordination of care, Bedside Nurse Shawna/Carlita and Hospitalist Dr. Sandra.  Time spend counseling with patient and family consisted of the following topics, goals of care, advance care planning, education about prognosis, care planning for discharge and symptom management.    Understanding of disease process:   This has been discussed with patient and family.    History of Present Illness   History is obtained from the patient, electronic health record and patient's family    Cande Baird is a 86 year old female with a past medical history of kidney transplant, hypertension, who presents with mechanical fall and left hip fracture resulting in need for Left ORIF on 4/22.  She has baseline kidney dysfunction and this was noted to worsen during hospital stay.  Nephrology consulted and confirmed goal to NOT pursue dialysis if indicated.  Patient was found to have UTI, treatment initiated.  Patient with recurrent nausea with medication administration and PO intake, decreased oral intake, declining medications.    This is in the setting of baseline independent living with  at [facility].  She has had no recent hospitalizations, weight loss or loss of function.  States to have decreased appetite and difficulty with weight loss in recent months.      Past Medical History   I have reviewed this patient's medical history and updated it with pertinent  information if needed.   Past Medical History:   Diagnosis Date     Arthritis      Atrial fibrillation with RVR (H) 09/17/2021     Benign essential hypertension      Glomerulonephritis      Renal transplant recipient 1997     Skin cancer     does not see dermatologist     Stage 3b chronic kidney disease (H)        Past Surgical History   I have reviewed this patient's surgical history and updated it with pertinent information if needed.  Past Surgical History:   Procedure Laterality Date     BIOPSY  6 months    Squeamish cell-removed     COLONOSCOPY  5/15/2013     LAPAROSCOPIC CYSTECTOMY OVARIAN (BENIGN)       OPEN REDUCTION INTERNAL FIXATION RODDING INTRAMEDULLAR FEMUR FRACTURE TABLE Left 4/22/2022    Procedure: Left hip cephalomedullary nail;  Surgeon: Raoul White MD;  Location: [facility] OR     TRANSPLANT  October 17, 1987    Kidney       Prior to Admission Medications   Prior to Admission Medications   Prescriptions Last Dose Informant Patient Reported? Taking?   GENGRAF (BRAND) 25 MG CAPSULE 4/20/2022 at am  Yes Yes   Sig: Take 25 mg by mouth 2 times daily   amLODIPine (NORVASC) 5 MG tablet 4/20/2022 at Unknown time  Yes Yes   Sig: Take 5 mg by mouth daily   apixaban ANTICOAGULANT (ELIQUIS ANTICOAGULANT) 2.5 MG tablet 4/20/2022 at am  No Yes   Sig: Take 1 tablet (2.5 mg) by mouth 2 times daily   azaTHIOprine (IMURAN) 50 MG tablet 4/19/2022 at Unknown time  Yes Yes   Sig: Take 50 mg by mouth At Bedtime   furosemide (LASIX) 20 MG tablet 4/20/2022 at Unknown time  Yes Yes   Sig: Take 20 mg by mouth daily   metoprolol succinate ER (TOPROL-XL) 100 MG 24 hr tablet 4/20/2022 at am  Yes Yes   Sig: Take 100 mg by mouth 2 times daily      Facility-Administered Medications: None     Allergies   No Known Allergies    Social History   I have updated and reviewed the following Social History Narrative:   Social History     Social History Narrative     Not on file           Living situation: independent living facility with  spouse       Support system: spouse, daughter       Actual/Potential Caregiver: daughter, spouse       Functional status: independent at baseline  History of substance use/abuse: no    Family History   I have reviewed this patient's family history and updated it with pertinent information if needed.   Family History   Problem Relation Age of Onset     Diabetes Mother 60     Peptic Ulcer Disease Father      Diabetes Sister      Rheumatoid Arthritis Sister      Diabetes Sister        Review of Systems   The 10 point Review of Systems is negative other than noted in the HPI or here.     Physical Exam   Temp:  [96.5  F (35.8  C)-98.6  F (37  C)] 98.6  F (37  C)  Pulse:  [] 108  Resp:  [16-18] 16  BP: (119-145)/(59-86) 119/63  SpO2:  [94 %-96 %] 96 %  126 lbs 12.8 oz  Exam:  GEN:  Elderly female, lying in bed, Alert, oriented x 3, appears comfortable, NAD.  HEENT:  Normocephalic/atraumatic, no scleral icterus, no nasal discharge, mouth dry.  CV:  RRR, S1, S2; no murmurs or other irregularities noted.  +3 DP/PT pulses bilatererally; no edema BLE.  RESP:  Clear to auscultation bilaterally without rales/rhonchi/wheezing/retractions.  Symmetric chest rise on inhalation noted.  Normal respiratory effort.  ABD:  Rounded, soft, non-tender/non-distended.  +BS  EXT:  Edema & pulses as noted above.  CMS intact x 4.     SKIN:  Dry to touch, no exanthems noted in the visualized areas.    NEURO: alert, no focal deficits, moves extremities against gravity  PAIN BEHAVIOR: Cooperative  Psych:  Normal affect.  Calm, cooperative, conversant appropriately.    Delirium Screen/CAM:  Delirium = (#1 and #2 = YES) + (#3 and/or #4)   1) Acute onset and fluctuating course:   No   (acute change in mental status from baseline over last 24 hours)  2) Inattention:   No   (difficulty focusing, distractible, can't follow conversation)  3) Disorganized thinking:   No   (score only if #1 and #2 are YES)  (rambling/irrelevant conversation,  unclear/illogical thoughts, inconsistency)  4) Altered level of consciousness:   No   (score only if #1 and #2 are YES)  (other than alert, calm, cooperative)    Delirium/CAM score: 0/4  Interpretation:  1)  Delirium:  Absent    Data   Results for orders placed or performed during the hospital encounter of 04/20/22 (from the past 24 hour(s))   Lactic Acid STAT   Result Value Ref Range    Lactic Acid 0.8 0.7 - 2.0 mmol/L   CBC with platelets   Result Value Ref Range    WBC Count 15.2 (H) 4.0 - 11.0 10e3/uL    RBC Count 2.68 (L) 3.80 - 5.20 10e6/uL    Hemoglobin 8.4 (L) 11.7 - 15.7 g/dL    Hematocrit 26.1 (L) 35.0 - 47.0 %    MCV 97 78 - 100 fL    MCH 31.3 26.5 - 33.0 pg    MCHC 32.2 31.5 - 36.5 g/dL    RDW 16.2 (H) 10.0 - 15.0 %    Platelet Count 303 150 - 450 10e3/uL   Basic metabolic panel   Result Value Ref Range    Sodium 133 133 - 144 mmol/L    Potassium 4.6 3.4 - 5.3 mmol/L    Chloride 108 94 - 109 mmol/L    Carbon Dioxide (CO2) 19 (L) 20 - 32 mmol/L    Anion Gap 6 3 - 14 mmol/L    Urea Nitrogen 67 (H) 7 - 30 mg/dL    Creatinine 4.00 (H) 0.52 - 1.04 mg/dL    Calcium 8.7 8.5 - 10.1 mg/dL    Glucose 99 70 - 99 mg/dL    GFR Estimate 10 (L) >60 mL/min/1.73m2

## 2022-04-26 NOTE — PROGRESS NOTES
[facility]  Hospitalist Progress Note  Admit 4/20/2022 11:47 AM    Name: Cande Baird    MRN: [id number]  Provider: Lucius Sandra MD    Date of Service: 04/26/2022     Reason for Stay (Diagnosis): Intratrochanteric hip fracture         Summary of hospital stay & Assessment/Plan:   Summary of Stay: Cande Baird is a 86 year old female who was admitted on 4/20/2022    86 year old female with past medical history of afib on chronic anticoagulation, CKD stage 3b s/p kidney transplant on immunosuppression, HTN who resides in a senior living facility who was admitted on 4/20/2022 after accidentally falling off of her bed and landing on her side and was found to have left intertrochanteric hip fracture.  Patient underwent surgical repair today.     Problem List/Assessment and Plan:      Mechanical fall with left intertrochanteric hip fracture status postsurgical repair on 4/22: Patient fell when she was sitting on her bed (mechanical in nature).  Found to have intertrochanteric fracture which was repaired by orthopedics on 4/22 she had open reduction and internal fixation.  -Orthopedics following   -Pain control with tramadol, Tylenol or IV Dilaudid for severe pain  -PT/OT/social work consults  -She was restarted on lower dose of Eliquis especially with worsening kidney function as recommended by cardiology     Paroxysmal atrial fibrillation and chronic diastolic CHF: Patient follows with Dr. Reynaga.  Patient had a Gisel scan in 10/2021 without any ischemia.  She is on Toprol 100 mg twice daily as well as Eliquis 2.5 mg twice daily for rate control and prophylaxis as well as amlodipine and Lasix.  Telemetry has shown episodes of atrial fibrillation with high-grade block and episodes of 2-1 block.  Cardiology was consulted in the preoperative setting.  It was recommended to hold her beta-blocker given 2-1 heart block and observe on telemetry.  Should she not improve consideration would be need to be given to  possible sick sinus syndrome and PPM.  -Cardiology consulted, appreciate recommendations  -Continue to hold beta-blocker, heart rate is reasonably controlled     Acute kidney injury with CKD stage IIIb and prior kidney transplant on immunosuppression: Creatinine was 2.6 in 1/2022, 3 in 2/2022 and 3.1 on 4/5/2022.  Her recent baseline seems to be about 3.  She follows with [facility] nephrology.  She was last seen in nephrology on 2/24/2022.  At that time she was found to have a UTI and treated with Keflex. She was noticing lower extremity edema and was started on Lasix 20 mg daily earlier this month.  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 Creatinine on admission was 3.31, today has increased slightly to 3.95.  Possibly UTI is contributing  -Continue to hold Lasix  --Nephrology following and discontinued IV fluids on 4/24  -Currently on PTA immunosuppressive regimen including azathioprine 50 mg nightly and cyclosporine 25 mg twice daily  -Renal function worsening with creatinine elevated at 4.00 today  -Patient is refusing her home medications and stated that she doesn't want live. She stated that she also doesn't want to do dialysis if indicated. Discussed with her extensively and agreed to palliative care consult to determine goal of care.      Acute on chronic anemia: Baseline hemoglobin approximately 11.  Hemoglobin was 10 on admission and decreased to 8.4 today.  This is in the setting of acute fracture and surgery.  No indication for transfusion now but if she decreases to 7 or less certainly would require this.  -CBC in the morning     Probable urinary tract infection, significant leukocytosis: WBC elevated 13.5 on admission, has increased to 24 on 4/22.  She is afebrile.   -Has recent history of UTI.  -Urine culture growing E.coli sensitive to ceftriaxone.   -Will continue IV Ceftriaxone for today as she has significant nausea. Will switch to po antibiotic  tomorrow     Hypertension: Prior to admission the patient was on metoprolol, Lasix and amlodipine.  As above metoprolol is on hold.  Holding Lasix as well.  -Continue amlodipine    DVT Prophylaxis: DOAC  Code Status:  Full Code    Disposition Plan     Expected discharge: Patient still feeling weak. She wants to stop treatment. Agrees to palliative care consult. Order placed for palliative care and input pending.      Entered: Lucius Sandra MD 04/26/2022, 11:21 AM           Interval History:   Patient seen and examined.  She stated that she wants to stop treatment and die. She stated that she had a good life and doesn't want to live any longer. Refused medications today. Agreed to palliative care consult                Physical Exam:   Physical Exam   Temp: 98.6  F (37  C) Temp src: Temporal BP: 119/63 Pulse: 108   Resp: 16 SpO2: 96 % O2 Device: None (Room air)    Vitals:    04/22/22 0551   Weight: 57.5 kg (126 lb 12.8 oz)     I/O last 3 completed shifts:  In: 400 [P.O.:400]  Out: 1050 [Urine:1050]          GENERAL:  Comfortable.   PSYCH: pleasant, oriented, No acute distress.  EYES: PERRLA, Normal conjunctiva.  HEART:  Normal S1, S2 with no edema.  LUNGS:  Clear to auscultation, normal Respiratory effort.  ABDOMEN:  Soft, no hepatosplenomegaly, normal bowel sounds.  SKIN:  Dry to touch, No rash.      Medications       amLODIPine  5 mg Oral Daily     apixaban ANTICOAGULANT  2.5 mg Oral BID     azaTHIOprine  50 mg Oral At Bedtime     cefTRIAXone  1 g Intravenous Q24H     cycloSPORINE modified  25 mg Oral BID     docusate sodium  100 mg Oral BID     metoprolol succinate ER  25 mg Oral Daily     polyethylene glycol  17 g Oral Daily     senna-docusate  1 tablet Oral BID     sodium bicarbonate  650 mg Oral TID     sodium chloride (PF)  3 mL Intracatheter Q8H     sodium chloride (PF)  3 mL Intracatheter Q8H     cholecalciferol  50 mcg Oral Daily     Data     -Data reviewed today:  I personally reviewed  all new  labs and imaging results over the last 24 hours.    Recent Labs   Lab 04/26/22  0659 04/24/22  0612 04/23/22  0706   WBC 15.2* 18.3* 22.9*   HGB 8.4* 7.5* 7.3*   HCT 26.1* 24.5* 23.5*   MCV 97 100 102*    241 238     Recent Labs   Lab 04/26/22  0659 04/25/22  1015 04/24/22  0612    129* 129*   POTASSIUM 4.6 4.4 4.2   CHLORIDE 108 104 103   CO2 19* 18* 16*   ANIONGAP 6 7 10   GLC 99 143* 100*   BUN 67* 63* 63*   CR 4.00* 3.98* 3.95*   GFRESTIMATED 10* 11* 11*   LETI 8.7 8.5 8.0*       No results found for this or any previous visit (from the past 24 hour(s)).    This document was produced using voice recognition software

## 2022-04-26 NOTE — PROGRESS NOTES
Renal Medicine Progress Note                                Cande Baird MRN# [id number]   Age: 86 year old YOB: 1935   Date of Admission: 4/20/2022 Hospital LOS: 6                  Assessment/Plan:     1.  ESRD   -previous dialysis requirement   -DDKT 1987 [facility]  2.  CKD stage 4   -creatinine 3 range   -eGFR 15 to 20 ml/min   -follows with Dr. Díaz of  Nephrology  3.  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  Hyponatremia   -low Uosm   -should correct with NS if needed      Continue same  Labs stable  Goals of care discussions ongoing         Interval History:     Refusing cares  Not taking meds including immunosuppressants        ROS:     GENERAL: NAD, No fever,chills  R: NEGATIVE for significant cough or SOB  CV: NEGATIVE for chest pain, palpitations  EXT: no change edema  ROS otherwise negative    Medications and Allergies:     Reviewed    Physical Exam:     Vitals were reviewed  Patient Vitals for the past 8 hrs:   BP Temp Temp src Pulse Resp SpO2   04/26/22 0740 119/63 98.6  F (37  C) Temporal 108 16 96 %     I/O last 3 completed shifts:  In: 400 [P.O.:400]  Out: 1050 [Urine:1050]    Vitals:    04/22/22 0551   Weight: 57.5 kg (126 lb 12.8 oz)       GENERAL: in bed/resting   HEENT: NC/AT  RESP:  clear anteriorly  CV: RRR, normal S1 S2  ABDOMEN: soft  NEURO: speech normal and cranial nerves 2-12 intact  EXT: warm, trace edema    Data:     Recent Labs   Lab 04/26/22  0659 04/25/22  1015 04/24/22  0612 04/23/22  0706    129* 129* 128*   POTASSIUM 4.6 4.4 4.2 4.4   CHLORIDE 108 104 103 103   CO2 19* 18* 16* 17*   ANIONGAP 6 7 10 8   GLC 99 143* 100* 108*   BUN 67* 63* 63* 61*   CR 4.00* 3.98* 3.95* 3.95*   GFRESTIMATED 10* 11* 11* 11*   LETI 8.7 8.5 8.0* 8.8         No lab results found in last 7 days.  Recent Labs   Lab 04/23/22  0306   COLOR Orange*   APPEARANCE Cloudy*   URINEGLC Negative   URINEBILI Negative   URINEKETONE  Negative   SG 1.010   UBLD Small*   URINEPH 5.5   PROTEIN 200 *   NITRITE Negative   LEUKEST Large*   RBCU 18*   WBCU >182*     Recent Labs   Lab 04/23/22  2117   UROSMOLALITY 195         G Jose Scott MD    [facility] - Nephrology  [phone]

## 2022-04-26 NOTE — PROGRESS NOTES
Orthopedic Surgery  Cande Baird  2022  Admit Date:  2022  POD: 4 Days Post-Op   Procedure(s):  Left hip cephalomedullary nail    Alert and oriented.   Patient resting comfortably in bed.    Pain controlled.  Tolerating oral intake.    Does have nausea   States she is waiting for palliative care to come talk with her.      Vital Sign Ranges  Temperature Temp  Av  F (36.7  C)  Min: 96.5  F (35.8  C)  Max: 98.6  F (37  C)   Blood pressure Systolic (24hrs), Av , Min:119 , Max:145        Diastolic (24hrs), Av, Min:59, Max:86      Pulse Pulse  Av  Min: 97  Max: 108   Respirations Resp  Av.3  Min: 16  Max: 18   Pulse oximetry SpO2  Av.3 %  Min: 94 %  Max: 96 %       Dressing is clean, dry, and intact.   Minimal erythema of the surrounding skin.   Bilateral calves are soft, non-tender.  Left lower extremity is NVI.  Sensation intact bilateral lower extremities  Patient able to resist dorsi and plantar flexion bilaterally  +Dp pulse    Labs:  Recent Labs   Lab Test 22  0659 22  0612 22  0706   WBC 15.2* 18.3* 22.9*     Recent Labs   Lab Test 22  0659 22  0612 22  0706   HGB 8.4* 7.5* 7.3*     Recent Labs   Lab Test 22  1416 21  2124 21  0803   INR 1.41* 0.99 1.18*     Recent Labs   Lab Test 22  0659 22  0612 22  0706    241 238        A/P  1. S/p ORIF left IT fx.               Continue Eliquis for DVT prophylaxis.                Mobilize with PT/OT WBAT.                Leave dressing undisturbed. Ok to shower with dressing in place.              Continue current pain regimen.   Palliative consult pending.      2. Disposition              Anticipate d/c to TCU pending progress with PT and medical stability.  Ortho stable.     Maile Mendoza PA-C

## 2022-04-27 NOTE — PROGRESS NOTES
Orthopedic Surgery  Cande Baird  4/27/2022  Admit Date:  4/20/2022  POD: 5 Days Post-Op  Procedure(s):  Left hip cephalomedullary nail    Patient resting comfortably in bed with family at bedside.     Pain controlled.  Patient is transitioning to hospice.     Vital Sign Ranges  Temperature No data recorded   Blood pressure No data recorded.       No data recorded.      Pulse No data recorded   Respirations No data recorded   Pulse oximetry No data recorded       Distal dressing is clean, dry, and intact. Proximal dressing with drainage.   Minimal erythema of the surrounding skin.   Bilateral calves are soft, non-tender.  Left lower extremity is NVI.  Sensation intact bilateral lower extremities  Patient able to resist dorsi and plantar flexion bilaterally  +Dp pulse    Labs:  Recent Labs   Lab Test 04/26/22  0659 04/25/22  1015 04/24/22  0612   POTASSIUM 4.6 4.4 4.2     Recent Labs   Lab Test 04/26/22  0659 04/24/22  0612 04/23/22  0706   HGB 8.4* 7.5* 7.3*     Recent Labs   Lab Test 04/20/22  1416 09/17/21  2124 08/25/21  0803   INR 1.41* 0.99 1.18*     Recent Labs   Lab Test 04/26/22  0659 04/24/22  0612 04/23/22  0706    241 238       A/P  1. S/p ORIF left IT fx.               Continue Eliquis for DVT prophylaxis.                Mobilize with PT/OT WBAT.                Change proximal dressing - adaptic, gauze, Tagaderm.               Continue current pain regimen.              Palliative consult pending.      2. Disposition              Anticipate d/c to [facility] pending medical stability. Ortho stable.     Lacey Hagen PA-C

## 2022-04-27 NOTE — PLAN OF CARE
Ax2 with walker and gait belt. A&O but forgetful.   DNR/DNI. Comfort cares. Removed from Tele.  Pt slept well this shift. Plan to discharge to [facility] with hospice support.

## 2022-04-27 NOTE — PLAN OF CARE
Goal Outcome Evaluation:   Patient is now on palliative care , most medications and treatments discontinued . No CPR DNR status. Plan to discharge to [facility] with hospice support.  Assist of 2 with transfers. Tele called that stated patient running at 130. Patient was being repositioned and stated that was arguing with the  on the phone. Will monitor.

## 2022-04-27 NOTE — PROGRESS NOTES
[facility]  Palliative Care Progress Note  Text Page     Assessment & Plan   Recommendations:  1. Goals of Care- No CPR- Do NOT Intubate  Hospitalization goals discussed with patient,  Cristofer and daughter Juanita at the bedside.    Reviewed patient's goals for comfort measures and hospice enrollment.  Decisional Capacity- Intact. Patient has an advance directive dated 04/13/2017.  If patient becomes unable to demonstrate decisional capacity, Cristofer Baird is the primary Health Care Agent.  Alternates are Juanita Green (daughter).   POLST Completed today indicating DNR, Comfort focused Measures.  - facilitate discharge to facility on hospice, working with SW to determine facility and hospice agency.  - DNR/DNI     2. Pain   S/P mechanical fall with Left hip fracture and subsequent Hip ORIF on 4/22/2022  Managed with PRN Tylenol  - acetaminophen 650 mg every 4 hours prn  - hydromorphone 0.2 mg IV every 2 hours prn  - hydromorphone 1 mg PO solution every 2 hours prn  - tramadol 25 mg every 6 hours prn  - Ice/Heat prn     3. End stage Renal Disease  History of kidney transplant in 1987, now with worsening CKD and nephrology is following for recommendations.  Patient refuses to pursue dialysis if needed.   - comfort focused plan of care.       4. Nausea/Vomiting  Significant nausea with any PO intake, gags on medications/pills.    - Zofran prn every 6 hours nausea.  - compazine as second line antiemetic agent  - lorazepam as third line antiemetic agent  - consider scheduled antiemetic for comfort     5. Spiritual Care  Oriented to Spiritual Health as part of Palliative Care team. Appreciate Care of  Harry Chung and Keyshawn Cuenca.   Spiritual Background: L[employer]-day     5. Care Planning  Appreciate Care of Multidisciplinary team.  - discharge to facility on hospice  - Hospice medications at discharge:   Acetaminophen PO/ mg every 4 hours prn pain/fever - crush tablets into slurry  for administration   hydromorphone 1 mg tablet every 2 hours prn - crush into slurry for administration   ativan 0.5 mg every 2 hours prn anxiety/nausea - crush into slurry for administration    Bisacodyl 1 suppository prn constipation      Medical Decision Making and Goals of Care:  Discussed on April 26, 2022 with Erica LUBIN CNP:   Met with patient,  Cristofer and daughter Juanita at the bedside.  Reviewed role of palliative care, transition to comfort measures and discharge onto hospice at facility.  Reviewed questions and concerns. Reviewed symptom burden and prn medications.  Reviewed hospice services and medications provided at discharge.  Discussed role of  and deferred placement questions to Phuong.    Educated on the POLST form and completed form to reflect DNR and comfort focused plan of care.    Erica LUBIN CNP  Pain Management and Palliative Care  [facility]  Pgr: [phone]      Time Spent on this Encounter   Total unit/floor time 35 minutes, time consisted of the following, examination of the patient, reviewing the record and completing documentation. >50% of time spent in counseling and coordination of care, Bedside Nurse iDanne and Hospitalist Dr. Sandra, and  Phuong Hagen.  Time spend counseling with patient and family consisted of the following topics, goals of care, education about prognosis, care planning for discharge and symptom management.    Review of Systems    CONSTITUTIONAL: NEGATIVE for fever, chills, change in weight  ENT/MOUTH: NEGATIVE for ear, mouth and throat problems  RESP: NEGATIVE for significant cough or SOB  CV: NEGATIVE for chest pain, palpitations or peripheral edema    Palliative Symptom Review (0=no symptom/no concern, 1=mild, 2=moderate, 3=severe):      Pain: 1-mild      Fatigue: 2-moderate      Nausea: 2-moderate      Constipation: 0-none      Diarrhea: 0-none      Depressive Symptoms:  2-moderate      Anxiety: 2-moderate      Drowsiness: 0-none      Poor Appetite: 1-mild      Shortness of Breath: 0-none      Insomnia: 1-mild      Overall (0 good/no concerns, 3 very poor):  2-moderate    Physical Exam   Temp:  [98.6  F (37  C)] 98.6  F (37  C)  Pulse:  [108] 108  Resp:  [16] 16  BP: (119)/(63) 119/63  SpO2:  [96 %] 96 %  126 lbs 12.8 oz  Exam:  GEN:  Elderly female, lying in bed, Alert, oriented x 3, appears comfortable, NAD.  HEENT:  Normocephalic/atraumatic, no scleral icterus, no nasal discharge, mouth dry.  CV:  RRR, S1, S2; no murmurs or other irregularities noted.  +3 DP/PT pulses bilatererally; no edema BLE.  RESP:  Clear to auscultation bilaterally without rales/rhonchi/wheezing/retractions.  Symmetric chest rise on inhalation noted.  Normal respiratory effort.  ABD:  Rounded, soft, non-tender/non-distended.  +BS  EXT:  Edema & pulses as noted above.  CMS intact x 4.     SKIN:  Dry to touch, no exanthems noted in the visualized areas.    NEURO: alert, no focal deficits, moves extremities against gravity  PAIN BEHAVIOR: Cooperative  Psych:  Normal affect.  Calm, cooperative, conversant appropriately.       Medications       sodium chloride (PF)  3 mL Intracatheter Q8H     sodium chloride (PF)  3 mL Intracatheter Q8H       Data   No results found for this or any previous visit (from the past 24 hour(s)).

## 2022-04-27 NOTE — PROGRESS NOTES
Renal Medicine         Transition to comfort cares/hospice per Palliative care note.    No further recommendations   Call with questions  Signing off       ANA Scott    [facility]  [phone]

## 2022-04-27 NOTE — PLAN OF CARE
Goal Outcome Evaluation:    Plan of Care Reviewed With: patient, family     Overall Patient Progress: declining    Outcome Evaluation: home w/ hospice    Pt A&O x4-forgetful. Comfort cares. Turned and repositioned w/ Ax2. Denies pain. Benjamin patent and draining. PO zofran given for nausea. Plan is for possible discharge to home tomorrow on hospice.

## 2022-04-27 NOTE — PROGRESS NOTES
Care Management Follow Up    Length of Stay (days): 7    Expected Discharge Date: 04/27/2022     Concerns to be Addressed: discharge planning    Patient plan of care discussed at interdisciplinary rounds: Yes    Anticipated Discharge Disposition: Home ([facility]) with hospice  Anticipated Discharge Services:  hospice  Anticipated Discharge DME: hospice to arrange    Education Provided on the Discharge Plan: yes  Patient/Family in Agreement with the Plan: yes    Referrals Placed by CM/SW:    Private pay costs discussed: Not applicable    Additional Information:  Reviewed pt's status and discussed in rounds. Met with pt, pt's spouse Cristofer and dtr Juanita to discuss discharge plans. Pt and her family are agreeable to writer reaching out to [facility] ([facility] preferred hospice agency) to see if they would have availability for an admission tomorrow or Friday. Pt and her family are agreeable to pt going into a room in the [facility] care as that is where the facility would be able to meet her assistance level needs (no current openings in the care suites). Pt/pt's family request medical transport at time of discharge.     MARY spoke with [facility] who requested writer fax referral to them at f: [phone]. Mary faxed requested information.     ADDENDUM @ 0284:    Received return call from [facility] stating they would be able to do an admission with pt tomorrow evening. [facility] will order a hospital bed tomorrow morning and requests 24-48 hours of meds. Writer inquired if they would want the meds filled here in the hospital and [facility] deferred that to [facility]. MARY will connect with [facility] tomorrow morning to confirm discharge plan.     Placed phone call to [facility] and spoke with Tomasa. MRAY provided update on above information. Tomasa stated she would update Antionette. Writer will also contact ABDULLAHI Adan p:[phone] tomorrow morning to confirm plan.     Discussed with bedside RN who stated pt would be able  to safely transport via wheelchair at discharge.     SW tentatively arranged [facility] w/c ride for tomorrow (4/28) @ 12:30.     Updated pt's spouse Cristofer and dtr Juanita via phone. Also updated palliative care NP & bedside RN.    Social work will continue to follow and assist with discharge planning as needed.    ALEJANDRA Andrade, LSW  Inpatient Care Coordination  [facility]  [phone]    ALEJANDRA Lund

## 2022-04-27 NOTE — PROGRESS NOTES
[facility]  Hospitalist Progress Note  Admit 4/20/2022 11:47 AM    Name: Cande Baird    MRN: [id number]  Provider: Lucius Sandra MD    Date of Service: 04/27/2022     Reason for Stay (Diagnosis): Intratrochanteric hip fracture         Summary of hospital stay & Assessment/Plan:   Summary of Stay: Cande Baird is a 86 year old female who was admitted on 4/20/2022    86 year old female with past medical history of afib on chronic anticoagulation, CKD stage 3b s/p kidney transplant on immunosuppression, HTN who resides in a senior living facility who was admitted on 4/20/2022 after accidentally falling off of her bed and landing on her side and was found to have left intertrochanteric hip fracture.  Patient underwent surgical repair today.     Problem List/Assessment and Plan:      Mechanical fall with left intertrochanteric hip fracture status postsurgical repair on 4/22: Patient fell when she was sitting on her bed (mechanical in nature).  Found to have intertrochanteric fracture which was repaired by orthopedics on 4/22 she had open reduction and internal fixation.  -status changed to comfort care only  -Continue pain medication as ordered per palliative care     Paroxysmal atrial fibrillation and chronic diastolic CHF: Patient follows with Dr. Reynaga.  Patient had a Gisel scan in 10/2021 without any ischemia.  She is on Toprol 100 mg twice daily as well as Eliquis 2.5 mg twice daily for rate control and prophylaxis as well as amlodipine and Lasix.  Telemetry has shown episodes of atrial fibrillation with high-grade block and episodes of 2-1 block.  Cardiology was consulted in the preoperative setting.  It was recommended to hold her beta-blocker given 2-1 heart block and observe on telemetry.  Should she not improve consideration would be need to be given to possible sick sinus syndrome and PPM.  -Her status is now changed to comfort care only     Acute kidney injury with CKD stage IIIb and prior  kidney transplant on immunosuppression: Creatinine was 2.6 in 1/2022, 3 in 2/2022 and 3.1 on 4/5/2022.  Her recent baseline seems to be about 3.  She follows with [facility] nephrology.  She was last seen in nephrology on 2/24/2022.  At that time she was found to have a UTI and treated with Keflex. She was noticing lower extremity edema and was started on Lasix 20 mg daily earlier this month.  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 Creatinine on admission was 3.31, today has increased slightly to 3.95.  Possibly UTI is contributing  -Continue to hold Lasix  --Nephrology following and discontinued IV fluids on 4/24  -Currently on PTA immunosuppressive regimen including azathioprine 50 mg nightly and cyclosporine 25 mg twice daily  -Renal function worsening with creatinine elevated at 4.00 today  -Patient is refused her home medications and wanted to change her status to comfort focused.  Palliative care consulted and now on comfort focused plan of care.     Acute on chronic anemia: Baseline hemoglobin approximately 11.  Hemoglobin was 10 on admission and decreased to 8.4 today.  This is in the setting of acute fracture and surgery.   -No blood draw as she is now on comfort care     Probable urinary tract infection, significant leukocytosis: WBC elevated 13.5 on admission, has increased to 24 on 4/22.  She is afebrile.   -Has recent history of UTI.  -Urine culture growing E.coli sensitive to ceftriaxone.   -Treated with ceftriaxone.  Now off antibiotics     Hypertension: Prior to admission the patient was on metoprolol, Lasix and amlodipine.  As above metoprolol is on hold.  Holding Lasix as well.  -Blood pressure medications discontinued as she is now on comfort care only    Code Status:  No CPR- Do NOT Intubate    Disposition Plan     Expected discharge:  following to assist with discharge planning     Entered: Lucius Sandra MD 04/27/2022, 1:29 PM            Interval History:   Patient is now on comfort care only.  Medications discontinued and placed on comfort care only.               Physical Exam:   Physical Exam                      Vitals:    04/22/22 0551   Weight: 57.5 kg (126 lb 12.8 oz)     I/O last 3 completed shifts:  In: 240 [P.O.:240]  Out: 875 [Urine:875]          GENERAL:  Comfortable.   PSYCH: pleasant, oriented, No acute distress.  EYES: PERRLA, Normal conjunctiva.  HEART:  Normal S1, S2 with no edema.  LUNGS:  Clear to auscultation, normal Respiratory effort.  ABDOMEN:  Soft, no hepatosplenomegaly, normal bowel sounds.  SKIN:  Dry to touch, No rash.      Medications       sodium chloride (PF)  3 mL Intracatheter Q8H     sodium chloride (PF)  3 mL Intracatheter Q8H     Data     -Data reviewed today:  I personally reviewed  all new labs and imaging results over the last 24 hours.    Recent Labs   Lab 04/26/22  0659 04/24/22  0612 04/23/22  0706   WBC 15.2* 18.3* 22.9*   HGB 8.4* 7.5* 7.3*   HCT 26.1* 24.5* 23.5*   MCV 97 100 102*    241 238     Recent Labs   Lab 04/26/22  0659 04/25/22  1015 04/24/22  0612    129* 129*   POTASSIUM 4.6 4.4 4.2   CHLORIDE 108 104 103   CO2 19* 18* 16*   ANIONGAP 6 7 10   GLC 99 143* 100*   BUN 67* 63* 63*   CR 4.00* 3.98* 3.95*   GFRESTIMATED 10* 11* 11*   LETI 8.7 8.5 8.0*       No results found for this or any previous visit (from the past 24 hour(s)).    This document was produced using voice recognition software

## 2022-04-27 NOTE — PROGRESS NOTES
"SPIRITUAL HEALTH SERVICES Progress Note  [facility] Orth[facility]    Saw pt Cande Baird per a [facility] consult; Palliative Care Nurse Erica Nieves requested  support for pt.  Provided reflective conversation to facilitate the processing of thoughts and feelings.      Illness Narrative -   o Cande reported that she cannot keep down food or medications and asserted \"I'm done. I don't want to do this again. I wish I could take something and die.\"    o She reviewed that she wants to focus on being kept comfortable and \"at peace\" for the remainder of her life.  Cande asked about the care provided by Hospice..      Distress -   o Cadne talked about the difficult dynamics between her spouse and her along with the rest of the family.  Her daughter is planning to move Cande into a separate unit at her [facility] so that Cande's  can visit but not stay with her, in order to support Cande's goal of being at peace.  o Cande shared that her brother  about a week ago and showed this author some materials about the Bible that he sent her before he .      Coping -   o Cande finds comfort and support from her daughter, son-in-law, grandchildren, and great grandchildren.  o She welcomed prayer.      Meaning-Making -   o Cande reflected on her vision of [facility], describing it as \"a wonderful place\" where she will experience God's forgiveness and will see her  son and parents.   o Cande talked about her parents and their legacy of charly.       Plan - Reviewed with pt how she can request further  support.  This author and other chaplains remain available per pt/family request.    Harry Chung M.Div., [facility]  Staff   Phone [phone]  "

## 2022-04-28 NOTE — PROGRESS NOTES
Care Management Follow Up    Length of Stay (days): 8    Expected Discharge Date: 04/28/2022     Concerns to be Addressed: discharge planning  Patient plan of care discussed at interdisciplinary rounds: Yes    Anticipated Discharge Disposition: Care Suite placement w/ hospice    Anticipated Discharge Services:  hospice  Anticipated Discharge DME:  Hospice to arrange     Education Provided on the Discharge Plan:  yes  Patient/Family in Agreement with the Plan: yes    Referrals Placed by CM/SW:    Private pay costs discussed: Not applicable    Additional Information:  Reviewed pt's status and discussed in rounds.     SW placed phone call to ABDULLAHI Adan at [facility] to confirm discharge plan. Antionette reported that pt is no longer able to discharge to their memory care - Antionette checked with her leadership and due to pt not having cognition issues, she is not allowed to be in the memory care setting. Per Antionette, they do not have an appropriate and safe placement for pt at [facility]. They are unable to accommodate pt in her ILF apartment due to pt being an assist of 2.     SW updated pt, pt's spouse Cristofer and dtr Juanita on above information. Discussed alternative options- residential hospice placement vs other facility care suites. Cristofer would prefer for pt to be as close as possible. Cristofer and pt expressed frustration about not being able to return to her home community. SW provided supportive listening and validated their feelings.     SW reached out to [facility] in [place] to inquire about availability in their care suites. Also reached out to [facility].    Social work will continue to follow and assist with discharge planning as needed.    ALEJANDRA Andrade, LSW  Inpatient Care Coordination  [facility]  [phone]    ALEJANDRA Lund

## 2022-04-28 NOTE — PLAN OF CARE
Goal Outcome Evaluation:    Pt A&O x4. Comfort cares maintained. Denies pain. Turned and repositioned with Ax2. Benjamin patent. Flat and hypoactive affect. Possible discharge home today via w/c with [facility] to ([facility]) with hospice at 12:30pm.

## 2022-04-28 NOTE — DISCHARGE SUMMARY
[facility]    Discharge Summary  Hospitalist    Date of Admission:  4/20/2022  Date of Discharge:  4/28/2022  Discharging Provider: Lucius Sandra MD, MD  Date of Service (when I saw the patient): 04/28/22    Discharge Diagnoses   Summary of Stay: Cande Baird is a 86 year old female who was admitted on 4/20/2022     86 year old female with past medical history of afib on chronic anticoagulation, CKD stage 3b s/p kidney transplant on immunosuppression, HTN who resides in a senior living facility who was admitted on 4/20/2022 after accidentally falling off of her bed and landing on her side and was found to have left intertrochanteric hip fracture.  Patient underwent surgical repair      Problem List/Assessment and Plan:      Mechanical fall with left intertrochanteric hip fracture status postsurgical repair on 4/22: Patient fell when she was sitting on her bed (mechanical in nature).  Found to have intertrochanteric fracture which was repaired by orthopedics on 4/22 she had open reduction and internal fixation.  -status changed to comfort care only  -Continue pain medication as ordered per palliative care  -Patient will be discharge with hospice care     Paroxysmal atrial fibrillation and chronic diastolic CHF: Patient follows with Dr. Reynaga.  Patient had a Gisel scan in 10/2021 without any ischemia.  She is on Toprol 100 mg twice daily as well as Eliquis 2.5 mg twice daily for rate control and prophylaxis as well as amlodipine and Lasix.  Telemetry has shown episodes of atrial fibrillation with high-grade block and episodes of 2-1 block.  Cardiology was consulted in the preoperative setting.  It was recommended to hold her beta-blocker given 2-1 heart block and observe on telemetry.  Should she not improve consideration would be need to be given to possible sick sinus syndrome and PPM.  -Her status is now changed to comfort care only. She will be discharged on hospice     Acute kidney  injury with CKD stage IIIb and prior kidney transplant on immunosuppression: Creatinine was 2.6 in 1/2022, 3 in 2/2022 and 3.1 on 4/5/2022.  Her recent baseline seems to be about 3.  She follows with [facility] nephrology.  She was last seen in nephrology on 2/24/2022.  At that time she was found to have a UTI and treated with Keflex. She was noticing lower extremity edema and was started on Lasix 20 mg daily earlier this month.  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 Creatinine on admission was 3.31, today has increased slightly to 3.95.  Possibly UTI is contributing  -Continue to hold Lasix  --Nephrology following and discontinued IV fluids on 4/24  -Currently on PTA immunosuppressive regimen including azathioprine 50 mg nightly and cyclosporine 25 mg twice daily  --Renal function worsening with creatinine elevated at 4.00 today  -Patient is refused her home medications and wanted to change her status to comfort focused.  Palliative care consulted and now on comfort focused plan of care.     Acute on chronic anemia: Baseline hemoglobin approximately 11.  Hemoglobin was 10 on admission and decreased to 8.4 today.  This is in the setting of acute fracture and surgery.   -No blood draw as she is now on comfort care     Probable urinary tract infection, significant leukocytosis: WBC elevated 13.5 on admission, has increased to 24 on 4/22.  She is afebrile.   -Has recent history of UTI.  -Urine culture growing E.coli sensitive to ceftriaxone.   -Treated with ceftriaxone.  Now off antibiotics     Hypertension: Prior to admission the patient was on metoprolol, Lasix and amlodipine.  As above metoprolol is on hold.  Holding Lasix as well.  -Blood pressure medications discontinued as she is now on comfort care only     Code Status:  No CPR- Do NOT Intubate    History of Present Illness   Cande Baird is an 86 year old female who presented with hospice care    Hospital Course   Summary  of Stay: Cande Baird is a 86 year old female who was admitted on 4/20/2022     86 year old female with past medical history of afib on chronic anticoagulation, CKD stage 3b s/p kidney transplant on immunosuppression, HTN who resides in a senior living facility who was admitted on 4/20/2022 after accidentally falling off of her bed and landing on her side and was found to have left intertrochanteric hip fracture.  Patient underwent surgical repair today.     Problem List/Assessment and Plan:      Mechanical fall with left intertrochanteric hip fracture status postsurgical repair on 4/22: Patient fell when she was sitting on her bed (mechanical in nature).  Found to have intertrochanteric fracture which was repaired by orthopedics on 4/22 she had open reduction and internal fixation.  -status changed to comfort care only  -Continue pain medication as ordered per palliative care     Paroxysmal atrial fibrillation and chronic diastolic CHF: Patient follows with Dr. Reynaga.  Patient had a Gisel scan in 10/2021 without any ischemia.  She is on Toprol 100 mg twice daily as well as Eliquis 2.5 mg twice daily for rate control and prophylaxis as well as amlodipine and Lasix.  Telemetry has shown episodes of atrial fibrillation with high-grade block and episodes of 2-1 block.  Cardiology was consulted in the preoperative setting.  It was recommended to hold her beta-blocker given 2-1 heart block and observe on telemetry.  Should she not improve consideration would be need to be given to possible sick sinus syndrome and PPM.  -Her status is now changed to comfort care only     Acute kidney injury with CKD stage IIIb and prior kidney transplant on immunosuppression: Creatinine was 2.6 in 1/2022, 3 in 2/2022 and 3.1 on 4/5/2022.  Her recent baseline seems to be about 3.  She follows with [facility] nephrology.  She was last seen in nephrology on 2/24/2022.  At that time she was found to have a UTI and treated with Keflex. She  was noticing lower extremity edema and was started on Lasix 20 mg daily earlier this month.  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 Creatinine on admission was 3.31, today has increased slightly to 3.95.  Possibly UTI is contributing  -Continue to hold Lasix  --Nephrology following and discontinued IV fluids on 4/24  -Currently on PTA immunosuppressive regimen including azathioprine 50 mg nightly and cyclosporine 25 mg twice daily  -Renal function worsening with creatinine elevated at 4.00 today  -Patient is refused her home medications and wanted to change her status to comfort focused.  Palliative care consulted and now on comfort focused plan of care.     Acute on chronic anemia: Baseline hemoglobin approximately 11.  Hemoglobin was 10 on admission and decreased to 8.4 today.  This is in the setting of acute fracture and surgery.   -No blood draw as she is now on comfort care     Probable urinary tract infection, significant leukocytosis: WBC elevated 13.5 on admission, has increased to 24 on 4/22.  She is afebrile.   -Has recent history of UTI.  -Urine culture growing E.coli sensitive to ceftriaxone.   -Treated with ceftriaxone.  Now off antibiotics     Hypertension: Prior to admission the patient was on metoprolol, Lasix and amlodipine.  As above metoprolol is on hold.  Holding Lasix as well.  -Blood pressure medications discontinued as she is now on comfort care only     Code Status:  No CPR- Do NOT Intubate/ Hospice    Significant Results and Procedures   Results for orders placed or performed during the hospital encounter of 04/20/22   XR Pelvis and Hip Left 1 View    Narrative    PELVIS AND LEFT HIP, ONE VIEW   4/20/2022 1:30 PM     HISTORY: Left hip pain.    COMPARISON: None.      Impression    IMPRESSION: Nondisplaced left intertrochanteric fracture extending to  the base of the femoral neck.    Mild to moderate bilateral hip degenerative changes.  Probable  benign-appearing chronic calcification in the right pelvis.    JOSE VARELA MD         SYSTEM ID:  [id number]   Head CT w/o contrast    Narrative    CT SCAN OF THE HEAD WITHOUT CONTRAST  4/20/2022 1:17 PM     HISTORY: Fall. Head trauma. Pain. Headache.    TECHNIQUE:  Axial images of the head and coronal reformations without  IV contrast material. Radiation dose for this scan was reduced using  automated exposure control, adjustment of the mA and/or kV according  to patient size, or iterative reconstruction technique.    COMPARISON: None.    FINDINGS: There is no evidence of intracranial hemorrhage, mass, acute  infarct or anomaly. The ventricles are normal in size, shape and  configuration. Mild patchy periventricular white matter hypodensities  which are nonspecific, but likely related to chronic microvascular  ischemic disease. 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    The visualized portions of the sinuses and mastoids appear normal. The  bony calvarium and bones of the skull base appear intact.       Impression    IMPRESSION:     1. No evidence of acute intracranial hemorrhage, mass, or herniation.  2. Mild nonspecific white matter changes likely due to chronic  microvascular ischemic disease.   3. 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    RASHAUN SUMMERS MD         SYSTEM ID:  [id number]   US Upper Extremity Venous Duplex Left    Narrative    US UPPER EXTREMITY VENOUS DUPLEX LEFT  4/21/2022 3:16 PM     HISTORY: Left arm swelling.    COMPARISON: Left internal jugular vein.    TECHNIQUE: Color Doppler and spectral waveform analysis performed  throughout the deep and superficial veins of the left upper extremity.    FINDINGS: The left internal jugular, subclavian, axillary, and  brachial veins demonstrate normal blood flow, compression (where  applicable), and augmentation. Basilic and cephalic veins are patent.  Radial and ulnar veins are compressible. Subcutaneous edema in  the  forearm.      Impression    IMPRESSION: Negative for deep venous thrombosis in the left upper  extremity.    SHANNON PZIARRO MD         SYSTEM ID:  [id number]   XR Surgery MEGAN L/T 5 Min Fluoro w Stills    Narrative    This exam was marked as non-reportable because it will not be read by a   radiologist or a [place] non-radiologist provider.         Echocardiogram Complete     Value    LVEF  >70%    Narrative    [id number]  [id number]  [id number]  [id number]^NADYA^HARLEEN^DELFINA     [facility]  Echocardiography Laboratory  [address] [place] [address]     Name: SHAWN STEVEN  MRN: [id number]  : 1935  Study Date: 2022 08:27 AM  Age: 86 yrs  Gender: Female  Patient Location: [facility]  Reason For Study: Murmur  Ordering Physician: HARLEEN COVINGTON  Performed By: Dolores Lehman     BSA: 1.6 m2  Height: 63 in  Weight: 119 lb  BP: 144/72 mmHg  ______________________________________________________________________________  Procedure  Complete Portable Echo Adult.  ______________________________________________________________________________  Interpretation Summary     Hyperdynamic left ventricular function  The visual ejection fraction is >70%.  No regional wall motion abnormalities noted.  There is moderate (2+) tricuspid regurgitation.  Right ventricular systolic pressure is elevated, consistent with mild  pulmonary hypertension.  The ascending aorta is Mildly dilated. (4.1cm)  Compared to , degree of TR is marginally worse and mild pulmonary  hypertension is now seen. The study was technically difficult.  ______________________________________________________________________________  Left Ventricle  The left ventricle is normal in size. There is normal left ventricular wall  thickness. Hyperdynamic left ventricular function. The visual ejection  fraction is >70%. Diastolic Doppler findings (E/E' ratio and/or other  parameters) suggest left ventricular filling pressures are  indeterminate.  Grade II or moderate diastolic dysfunction. No regional wall motion  abnormalities noted.     Right Ventricle  The right ventricle is normal size. The right ventricular systolic function is  normal.     Atria  Normal left atrial size. Right atrial size is normal.     Mitral Valve  There is trace mitral regurgitation.     Tricuspid Valve  There is moderate (2+) tricuspid regurgitation. The right ventricular systolic  pressure is approximated at 37.0 mmHg plus the right atrial pressure. Right  ventricular systolic pressure is elevated, consistent with mild pulmonary  hypertension.     Aortic Valve  There is mild trileaflet aortic sclerosis. There is trace to mild aortic  regurgitation. No aortic stenosis is present.     Pulmonic Valve  There is trace to mild pulmonic valvular regurgitation.     Vessels  The ascending aorta is Mildly dilated. (4.1cm). Inferior vena cava not well  visualized for estimation of right atrial pressure.     Pericardium  Trivial pericardial effusion.     Rhythm  Sinus rhythm was noted.  ______________________________________________________________________________  MMode/2D Measurements & Calculations  IVSd: 0.93 cm  LVIDd: 3.5 cm  LVIDs: 2.0 cm  LVPWd: 0.89 cm  FS: 43.0 %  LV mass(C)d: 90.4 grams  LV mass(C)dI: 58.3 grams/m2  Ao root diam: 3.1 cm  asc Aorta Diam: 4.1 cm  LVOT diam: 2.0 cm  LVOT area: 3.1 cm2  RWT: 0.51     Doppler Measurements & Calculations  MV E max jean: 68.0 cm/sec  MV A max jean: 80.3 cm/sec  MV E/A: 0.85  MV dec slope: 283.0 cm/sec2  MV dec time: 0.24 sec  Ao V2 max: 197.1 cm/sec  Ao max PG: 15.5 mmHg  Ao V2 mean: 137.0 cm/sec  Ao mean P.2 mmHg  Ao V2 VTI: 40.0 cm  BENSON(I,D): 2.0 cm2  BENSON(V,D): 1.9 cm2  LV V1 max P.7 mmHg  LV V1 max: 119.0 cm/sec  LV V1 VTI: 25.2 cm  SV(LVOT): 79.2 ml  SI(LVOT): 51.1 ml/m2     PA acc time: 0.10 sec  TR max jean: 304.0 cm/sec  TR max P.0 mmHg  AV Jean Ratio (DI): 0.60  BENSON Index (cm2/m2): 1.3  E/E' avg:  11.7  Lateral E/e': 10.8  Medial E/e': 12.6     ______________________________________________________________________________  Report approved by: Lucas Boudreaux 04/21/2022 10:10 AM                 Pending Results   None  Code Status   Comfort Care/hospice       Primary Care Physician   [facility]        Discharge Disposition   Discharged to hospice  Condition at discharge: Guarded    Consultations This Hospital Stay   ORTHOPEDIC SURGERY IP CONSULT  CARDIOLOGY IP CONSULT  PHYSICAL THERAPY ADULT IP CONSULT  OCCUPATIONAL THERAPY ADULT IP CONSULT  NEPHROLOGY IP CONSULT  CARE MANAGEMENT / SOCIAL WORK IP CONSULT  PHYSICAL THERAPY ADULT IP CONSULT  OCCUPATIONAL THERAPY ADULT IP CONSULT  PALLIATIVE CARE ADULT IP CONSULT  SPIRITUAL HEALTH SERVICES IP CONSULT    Time Spent on this Encounter   ILucius MD, MD, personally saw the patient today and spent less than or equal to 30 minutes discharging this patient.    Discharge Orders      Primary Care - Care Coordination Referral      Follow-Up with Cardiology [facility]      General info for SNF    Length of Stay Estimate: Short Term Care: Estimated # of Days <30  Condition at Discharge: Improving  Level of care:skilled   Rehabilitation Potential: Good  Admission H&P remains valid and up-to-date: Yes  Recent Chemotherapy: N/A  Use Nursing Home Standing Orders: Yes     Mantoux instructions    Give two-step Mantoux (PPD) Per Facility Policy Yes     Reason for your hospital stay    Left hip fracture: IM Nail     Wound care (specify)    Site:   Left hip   Instructions:  Keep dressings clean, dry and intact.  Change if peeling off or >60% saturated.  Do not immerse.  Ok to shower over tegaderm and aquacel dressings.     Follow Up and recommended labs and tests    Follow up with Dr. White/Jennifer GIANG at [facility] 2 weeks after surgery.  If still in TCU, can be seen by the orthopedic provider at the care facility (if this is an option).    Call the care coordinator  at [phone] to arrange the appt.   [facility] [place]: [phone]  [facility]: [phone]     Activity - Up with assistive device    Up with walker/assist     Weight bearing status    WBAT Left LE with walker     Physical Therapy Adult Consult    Evaluate and treat as clinically indicated.    Reason:  Left hip fracture: IM Nail     Occupational Therapy Adult Consult    Evaluate and treat as clinically indicated.    Reason:  Left hip fracture: IM Nail     Fall precautions     Crutches DME    DME Documentation: Describe the reason for need to support medical necessity: Impaired gait status post hip surgery. I, the undersigned, certify that the above prescribed supplies are medically necessary for this patient and is both reasonable and necessary in reference to accepted standards of medical practice in the treatment of this patient's condition and is not prescribed as a convenience.     Cane DME    DME Documentation: Describe the reason for need to support medical necessity: Impaired gait status post hip surgery. I, the undersigned, certify that the above prescribed supplies are medically necessary for this patient and is both reasonable and necessary in reference to accepted standards of medical practice in the treatment of this patient's condition and is not prescribed as a convenience.     Walker DME    : DME Documentation: Describe the reason for need to support medical necessity: Impaired gait status post hip surgery. I, the undersigned, certify that the above prescribed supplies are medically necessary for this patient and is both reasonable and necessary in reference to accepted standards of medical practice in the treatment of this patient's condition and is not prescribed as a convenience.     Diet    Follow this diet upon discharge: Orders Placed This Encounter      Advance Diet as Tolerated: Regular Diet Adult     Discharge Medications   Current Discharge Medication List      START taking these  medications    Details   acetaminophen (TYLENOL) 325 MG tablet Take 2 tablets (650 mg) by mouth every 4 hours as needed for other (For optimal non-opioid multimodal pain management to improve pain control.)  Qty: 30 tablet, Refills: 0    Associated Diagnoses: Closed intertrochanteric fracture of hip, left, initial encounter (H)      acetaminophen (TYLENOL) 650 MG suppository Place 1 suppository (650 mg) rectally every 4 hours as needed for fever  Qty: 6 suppository, Refills: 0    Associated Diagnoses: S/P kidney transplant; Hospice care patient      bisacodyl (DULCOLAX) 10 MG suppository Place 1 suppository (10 mg) rectally daily as needed for constipation  Qty: 2 suppository, Refills: 0    Associated Diagnoses: S/P kidney transplant; Hospice care patient      HYDROmorphone (DILAUDID) 2 MG tablet Take 0.5 tablets (1 mg) by mouth every 4 hours as needed for pain or shortness of breath / dyspnea Crush tablet into slurry for administration  Qty: 10 tablet, Refills: 0    Comments: Hospice discharge to facility.  Please bubble pack.  Associated Diagnoses: Closed intertrochanteric fracture of hip, left, initial encounter (H); S/P kidney transplant; Hospice care patient      LORazepam (ATIVAN) 0.5 MG tablet Take 1 tablet (0.5 mg) by mouth every 6 hours as needed for anxiety or nausea Crush tablet into slurry.  Qty: 12 tablet, Refills: 0    Comments: Patient discharging to hospice at facility. Please bubble pack  Associated Diagnoses: S/P kidney transplant; Hospice care patient      ondansetron (ZOFRAN-ODT) 4 MG ODT tab Take 1 tablet (4 mg) by mouth every 6 hours as needed for nausea or vomiting  Qty: 8 tablet, Refills: 0    Associated Diagnoses: S/P kidney transplant; Hospice care patient      senna-docusate (SENOKOT-S/PERICOLACE) 8.6-50 MG tablet Take 1 tablet by mouth 2 times daily as needed for constipation  Qty: 15 tablet    Associated Diagnoses: Closed intertrochanteric fracture of hip, left, initial encounter (H)          STOP taking these medications       amLODIPine (NORVASC) 5 MG tablet Comments:   Reason for Stopping:         apixaban ANTICOAGULANT (ELIQUIS ANTICOAGULANT) 2.5 MG tablet Comments:   Reason for Stopping:         azaTHIOprine (IMURAN) 50 MG tablet Comments:   Reason for Stopping:         furosemide (LASIX) 20 MG tablet Comments:   Reason for Stopping:         GENGRAF (BRAND) 25 MG CAPSULE Comments:   Reason for Stopping:         metoprolol succinate ER (TOPROL-XL) 100 MG 24 hr tablet Comments:   Reason for Stopping:               Allergies   No Known Allergies  Data   Most Recent 3 CBC's:Recent Labs   Lab Test 04/26/22  0659 04/24/22  0612 04/23/22  0706   WBC 15.2* 18.3* 22.9*   HGB 8.4* 7.5* 7.3*   MCV 97 100 102*    241 238      Most Recent 3 BMP's:  Recent Labs   Lab Test 04/26/22  0659 04/25/22  1015 04/24/22  0612    129* 129*   POTASSIUM 4.6 4.4 4.2   CHLORIDE 108 104 103   CO2 19* 18* 16*   BUN 67* 63* 63*   CR 4.00* 3.98* 3.95*   ANIONGAP 6 7 10   LETI 8.7 8.5 8.0*   GLC 99 143* 100*     Most Recent 2 LFT's:  Recent Labs   Lab Test 09/17/21 2124 08/26/21  0818   AST 39 26   ALT 33 23   ALKPHOS 116 190*   BILITOTAL 0.5 0.4     Most Recent INR's and Anticoagulation Dosing History:  Anticoagulation Dose History     Recent Dosing and Labs Latest Ref Rng & Units 8/25/2021 9/17/2021 4/20/2022    INR 0.85 - 1.15 1.18(H) 0.99 1.41(H)        Most Recent 3 Troponin's:  Recent Labs   Lab Test 09/17/21 2124 08/09/21  1857   TROPONIN <0.015 <0.015     Most Recent Cholesterol Panel:  Recent Labs   Lab Test 08/15/17  1047   CHOL 220*   *   HDL 79   TRIG 139     Most Recent 6 Bacteria Isolates From Any Culture (See EPIC Reports for Culture Details):  Recent Labs   Lab Test 12/04/19  1118 09/25/19  1343 02/25/19  1013 08/30/17  1247 12/20/16  1436   CULT 10,000 to 50,000 colonies/mL  mixed urogenital jeanette  Susceptibility testing not routinely done   10,000 to 50,000 colonies/mL  mixed urogenital  jeanette   >100,000 colonies/mL  Escherichia coli  * >100,000 colonies/mL  Escherichia coli  * >100,000 colonies/mL Klebsiella pneumoniae  >100,000 colonies/mL Strain 2 Klebsiella pneumoniae  <10,000 colonies/mL urogenital jeanette Susceptibility testing not routinely done  *     Most Recent TSH, T4 and A1c Labs:  Recent Labs   Lab Test 09/17/21 2124   TSH 2.20

## 2022-04-28 NOTE — PLAN OF CARE
Goal Outcome Evaluation:    Plan of Care Reviewed With: patient     Overall Patient Progress: declining    Pt A&O x4-forgetful. Comfort cares. Turned and repositioned w/ Ax2. Denies pain. Benjamin patent and draining. Plan is TBD-discharging on hospice.

## 2022-04-28 NOTE — PROGRESS NOTES
[facility]  Palliative Care Progress Note  Text Page     Assessment & Plan   Recommendations:  1. Goals of Care- No CPR- Do NOT Intubate  Hospitalization goals discussed with patient at the bedside.  Goal for comfort and discharge to facility on hospice.  Decisional Capacity- Intact. Patient has an advance directive dated 04/13/2017.  If patient becomes unable to demonstrate decisional capacity, Cristofer Baird is the primary Health Care Agent.  Alternates are Juanita Green (daughter).   POLST Completed today indicating DNR, Comfort focused Measures.  - facilitate discharge to facility on hospice, working with SW to determine facility and hospice agency.  - DNR/DNI     2. Pain   S/P mechanical fall with Left hip fracture and subsequent Hip ORIF on 4/22/2022  Managed with PRN Tylenol  - acetaminophen 650 mg every 4 hours prn  - hydromorphone 0.2 mg IV every 2 hours prn  - hydromorphone 1 mg PO solution every 2 hours prn  - tramadol 25 mg every 6 hours prn  - Ice/Heat prn     3. End stage Renal Disease  History of kidney transplant in 1987, now with worsening CKD and nephrology is following for recommendations.  Patient refuses to pursue dialysis if needed.   - comfort focused plan of care.       4. Nausea/Vomiting  Significant nausea with any PO intake, gags on medications/pills.    - Zofran prn every 6 hours nausea.  - compazine as second line antiemetic agent  - lorazepam as third line antiemetic agent  - consider scheduled antiemetic for comfort     5. Spiritual Care  Oriented to Spiritual Health as part of Palliative Care team. Appreciate Care of  Harry Chung and Keyshawn Cuenca.   Spiritual Background: Jehovah's witness     5. Care Planning  Appreciate Care of Multidisciplinary team.  - discharge to facility on hospice  - Hospice medications at discharge:   Acetaminophen PO/ mg every 4 hours prn pain/fever - crush tablets into slurry for administration   hydromorphone 1 mg tablet every  2 hours prn - crush into slurry for administration   ativan 0.5 mg every 2 hours prn anxiety/nausea - crush into slurry for administration    Bisacodyl 1 suppository prn constipation      Medical Decision Making and Goals of Care:  Discussed on April 28, 2022 with Erica LUBIN CNP:   Met with Cande at the bedside.  Reviewed plan of care and current symptoms.  Cande denied significant symptoms or distress.  Stated she wishes to get home and comfortable.  Reviewed hospice medications and indications for use.  Reviewed support and plan for comfort following discharge as a means to promote reassurance and comfort.  She verbalized understanding and denied further questions or concerns.    Erica LUBIN CNP  Pain Management and Palliative Care  [facility]  Pgr: [phone]      Time Spent on this Encounter   Total unit/floor time 25 minutes, time consisted of the following, examination of the patient, reviewing the record and completing documentation. >50% of time spent in counseling and coordination of care, Bedside Nurse Dianne and Hospitalist Dr. Sandra, and  Phuong Hagen.  Time spend counseling with patient and family consisted of the following topics, care planning for discharge and symptom management.    Review of Systems    CONSTITUTIONAL: NEGATIVE for fever, chills, change in weight  ENT/MOUTH: NEGATIVE for ear, mouth and throat problems  RESP: NEGATIVE for significant cough or SOB  CV: NEGATIVE for chest pain, palpitations or peripheral edema    Palliative Symptom Review (0=no symptom/no concern, 1=mild, 2=moderate, 3=severe):      Pain: 1-mild      Fatigue: 2-moderate      Nausea: 2-moderate      Constipation: 0-none      Diarrhea: 0-none      Depressive Symptoms: 2-moderate      Anxiety: 2-moderate      Drowsiness: 0-none      Poor Appetite: 1-mild      Shortness of Breath: 0-none      Insomnia: 1-mild      Overall (0 good/no concerns, 3 very poor):   2-moderate    Physical Exam      126 lbs 12.8 oz  Exam:  GEN:  Elderly female, lying in bed, Alert, oriented x 3, appears comfortable, NAD.  CV:  +3 DP/PT pulses bilatererally; no edema BLE.  RESP: Symmetric chest rise on inhalation noted.  Normal respiratory effort.  ABD:  Rounded, soft, non-tender/non-distended.  +BS   SKIN:  Dry to touch, no exanthems noted in the visualized areas.    NEURO: alert, no focal deficits, moves extremities against gravity  PAIN BEHAVIOR: Cooperative  Psych:  Normal affect.  Calm, cooperative, conversant appropriately.       Medications       sodium chloride (PF)  3 mL Intracatheter Q8H     sodium chloride (PF)  3 mL Intracatheter Q8H       Data   No results found for this or any previous visit (from the past 24 hour(s)).

## 2022-04-29 NOTE — PROGRESS NOTES
Cande Baird is a 86-year-old lady with atrial fibrillation on chronic anticoagulation, CKD stage IIIb s/p kidney transplantation on immunosuppressive therapy, paroxysmal atrial fibrillation, chronic diastolic congestive heart failure, hypertension, was admitted to [facility] 4/20/2022 after accidentally falling off her bed and sustaining a left intertrochanteric hip fracture for which she underwent surgical repair on 4/22/2022.  She was diagnosed with E. coli urinary tract infection for which she was treated with ceftriaxone.  Did have acute kidney injury patient was refusing her home medications and wanted to change her status to comfort care only.  She is DNR/DNI.  She will be discharged on hospice care and facilities are being looked into by .     S patient looks comfortable  Earlier had nausea for which Zofran was given    /63 (BP Location: Left arm)   Pulse 108   Temp 98.6  F (37  C) (Temporal)   Resp 16   Ht 1.524 m (5')   Wt 57.5 kg (126 lb 12.8 oz)   SpO2 96%   BMI 24.76 kg/m     Patient was comfortable and sleeping in the bed  Heart S1-S2 irregular tachycardia was present  Lungs decreased breath sounds in the lung bases    A:  Left hip intertrochanteric fracture s/p ORIF 4/22/2022  E. coli acute UTI  Acute on chronic anemia  S/p renal transplantation on immunosuppressive therapy  Paroxysmal atrial fibrillation   Chronic diastolic CHF  Hypertension    P:  Awaiting placement  On comfort care measures -appears to be comfortable    Dr. Chano PIKE.  Hospitalist [place], [place].

## 2022-04-29 NOTE — PROGRESS NOTES
Care Management Follow Up    Length of Stay (days): 9    Expected Discharge Date: 04/29/2022     Concerns to be Addressed: discharge planning  Patient plan of care discussed at interdisciplinary rounds: Yes    Anticipated Discharge Disposition: [facility]   Anticipated Discharge Services:  [facility]  Anticipated Discharge DME:  Hospice to arrange and coordinate    Education Provided on the Discharge Plan: yes  Patient/Family in Agreement with the Plan: yes    Referrals Placed by CM/SW:    Private pay costs discussed: transportation costs    Additional Information:   Coordinated discharge plan for Monday with pt, pt's spouse Cristofer, and dtr Juanita. Plan is for pt to go to [facility] to their care suites on Monday with [facility]. [facility] will have equipment delivered and tentative plan to do an admission on Monday at noon.     SW to arrange transport.     Social work will continue to follow and assist with discharge planning as needed.    ALEJANDRA Andrade, LSW  Inpatient Care Coordination  [facility], [facility]  [phone]    ALEJANDRA Lund

## 2022-04-29 NOTE — PROGRESS NOTES
Pt A/O x 4, forgetful. Comfort cares. Benjamin patent and draining. Tolerated most repositioning. Discharge TBD - trying to find placement to hospice

## 2022-04-29 NOTE — PLAN OF CARE
Pt oriented when aroused from sleep. On comfort cares.  Nausea treated with IV zofran this am.  Benjamin present and cares completed.  Pt refuses repositioning.  Denies pain.  Pt has poor intake.  Was provided with root beer from daughter with about 240mL intake.  Discharge to hospice care suite when placement found.

## 2022-04-30 NOTE — PROGRESS NOTES
Cande Baird is a 86-year-old lady with atrial fibrillation on chronic anticoagulation, CKD stage IIIb s/p kidney transplantation on immunosuppressive therapy, paroxysmal atrial fibrillation, chronic diastolic congestive heart failure, hypertension, was admitted to [facility] 4/20/2022 after accidentally falling off her bed and sustaining a left intertrochanteric hip fracture for which she underwent surgical repair on 4/22/2022.  She was diagnosed with E. coli urinary tract infection for which she was treated with ceftriaxone.  Did have acute kidney injury patient was refusing her home medications and wanted to change her status to comfort care only.  She is DNR/DNI.  She will be discharged on hospice care and facilities are being looked into by .     S Patient looks comfortable.  Denies any new symptoms   Earlier had nausea for which Zofran was given    /63 (BP Location: Left arm)   Pulse 108   Temp 98.6  F (37  C) (Temporal)   Resp 16   Ht 1.524 m (5')   Wt 57.5 kg (126 lb 12.8 oz)   SpO2 96%   BMI 24.76 kg/m       Patient was comfortable and sleeping in the bed  Heart S1-S2 irregular tachycardia was present  Lungs decreased breath sounds in the lung bases    A:  Left hip intertrochanteric fracture s/p ORIF 4/22/2022  E. coli acute UTI  Acute on chronic anemia  S/p renal transplantation on immunosuppressive therapy  Paroxysmal atrial fibrillation   Chronic diastolic CHF  Hypertension    P:  Awaiting placement - Probably discharge Monday 5/2/2022 at 11 am   On comfort care measures -appears to be comfortable    Dr. Chano PIKE.  Hospitalist [place], [place].

## 2022-04-30 NOTE — PLAN OF CARE
Pt on comfort cares, olmstead in place.  Repositioned x 1 this shift.  IV locked.  No emesis/nausea this shift.  Pt able to sit up and brush teeth, wash face.  Discharge Monday to care suite with hospice.

## 2022-04-30 NOTE — PLAN OF CARE
Alert. Reporting feeling nauseous but declined antiemetic. Repositioned. Verbalized wanted to die. Frequent safety check provided. Benjamin in place. Possible discharged Monday to [facility]  care suite with hospice. Calm and pleasant with cares.

## 2022-05-01 NOTE — PLAN OF CARE
Pt on comfort cares.  Refuses repositioning today.  No nausea, no pain.  IV locked.  Benjamin patent with adequate output.  Regular diet.  Pt requesting root beer and [employer] today - provided by family with 25% intake.  Dressings to hip CDI.  Foam dressing to elbow CDI.  Will discharge Monday to care suite on hospice at 1130am via [facility] stretcher.

## 2022-05-01 NOTE — PLAN OF CARE
"Pt was nauseous and vomiting. PRN IV Zofran given. Reported pain and requested pain medications. 0.2mg IV dilaudid given with relief. Dressing saturated and was changed. Repositioned and changed bed sheets and gown, pt verbalized wanted to eat to get better and that she wants to order \"mash potatoes for breakfast\" new IV placed. Benjamin in place with good output. Calm and pleasant with care.       "

## 2022-05-01 NOTE — PROGRESS NOTES
Cande Baird is a 86-year-old lady with atrial fibrillation on chronic anticoagulation, CKD stage IIIb s/p kidney transplantation on immunosuppressive therapy, paroxysmal atrial fibrillation, chronic diastolic congestive heart failure, hypertension, was admitted to [facility] 4/20/2022 after accidentally falling off her bed and sustaining a left intertrochanteric hip fracture for which she underwent surgical repair on 4/22/2022.  She was diagnosed with E. coli urinary tract infection for which she was treated with ceftriaxone.  Did have acute kidney injury patient was refusing her home medications and wanted to change her status to comfort care only.  She is DNR/DNI.  She will be discharged on hospice care and facilities are being looked into by .     S Patient looks comfortable.  Was sleeping and did not wake up with my examination      /55 (BP Location: Left arm)   Pulse 77   Temp 99.1  F (37.3  C) (Temporal)   Resp 16   Ht 1.524 m (5')   Wt 57.5 kg (126 lb 12.8 oz)   SpO2 95%   BMI 24.76 kg/m       Patient was comfortable and sleeping in the bed  Heart S1-S2 irregular tachycardia was present  Lungs decreased breath sounds in the lung bases    A:  Left hip intertrochanteric fracture s/p ORIF 4/22/2022  E. coli acute UTI  Acute on chronic anemia  S/p renal transplantation on immunosuppressive therapy  Paroxysmal atrial fibrillation   Chronic diastolic CHF  Hypertension    P:  Awaiting placement - Probably discharge Monday 5/2/2022 at 11 am   On comfort care measures -appears to be comfortable    Dr. Chano PIKE.  Hospitalist [place], [place].              detailed exam conjunctiva clear/PERRL

## 2022-05-01 NOTE — PROGRESS NOTES
Care Management Follow Up    Length of Stay (days): 11    Expected Discharge Date: 05/02/2022     Concerns to be Addressed: all concerns addressed in this encounter  patient wanting TCU  Patient plan of care discussed at interdisciplinary rounds: Yes    Anticipated Discharge Disposition: Transitional Care  Disposition Comments: [facility]CO need  Anticipated Discharge Services:    Anticipated Discharge DME:      Patient/family educated on Medicare website which has current facility and service quality ratings:    Education Provided on the Discharge Plan:    Patient/Family in Agreement with the Plan: yes    Referrals Placed by CM/SW:    Private pay costs discussed: Not applicable    Additional Information:  CM arranged stretcher transport for 1130 to [place] [facility] suites with [facility].    Kasey Ayala RN, BSN CTS  Care Coordinator  [facility]   [phone]

## 2022-05-01 NOTE — PLAN OF CARE
Goal Outcome Evaluation:  Pt oriented when aroused from sleep. On comfort cares.  Benjamin present and cares completed.  Pt refuses repositioning.  Denies pain.  Pt has poor intake.  Root beer  with ice given pt drink  half of the can and about oz of water . Discharge to hospice care suite when placement found.

## 2022-05-02 NOTE — DISCHARGE SUMMARY
Hospitalist Discharge Summary      Date of Admission:  4/20/2022  Date of Discharge:  5/2/2022  Discharging Provider: Chano Cortes MD  Discharge Service: Hospitalist Service    Discharge Diagnoses   Left hip intertrochanteric fracture s/p ORIF 4/22/2022  E. coli acute UTI  Acute on chronic anemia  S/p renal transplantation on immunosuppressive therapy  Paroxysmal atrial fibrillation   Chronic diastolic CHF  Hypertension    Follow-ups Needed After Discharge   Follow-up Appointments     Follow Up and recommended labs and tests      Follow up with Dr. White/Jennifer GIANG at [facility] 2 weeks after surgery.  If still   in TCU, can be seen by the orthopedic provider at the care facility (if   this is an option).    Call the care coordinator at [phone] to arrange the appt.   [facility]: [phone]  [facility]: [phone]             Discharge Disposition   Discharged to TCU  Condition at discharge: Fair  Patient ready to discharge to a skilled nursing facility as soon as possible in order to create capacity for patients related to the COVID-19 pandemic.    Hospital Course   Cande Baird is a 86-year-old lady with atrial fibrillation on chronic anticoagulation, CKD stage IIIb s/p kidney transplantation on immunosuppressive therapy, paroxysmal atrial fibrillation, chronic diastolic congestive heart failure, hypertension, was admitted to [facility] 4/20/2022 after accidentally falling off her bed and sustaining a left intertrochanteric hip fracture for which she underwent surgical repair on 4/22/2022.  She was diagnosed with E. coli urinary tract infection for which she was treated with ceftriaxone.  Did have acute kidney injury patient was refusing her home medications and wanted to change her status to comfort care only.  She is DNR/DNI.  She will be discharged on hospice care    Consultations This Hospital Stay   ORTHOPEDIC SURGERY IP CONSULT  CARDIOLOGY IP  CONSULT  PHYSICAL THERAPY ADULT IP CONSULT  OCCUPATIONAL THERAPY ADULT IP CONSULT  NEPHROLOGY IP CONSULT  CARE MANAGEMENT / SOCIAL WORK IP CONSULT  PHYSICAL THERAPY ADULT IP CONSULT  OCCUPATIONAL THERAPY ADULT IP CONSULT  PALLIATIVE CARE ADULT IP CONSULT  SPIRITUAL HEALTH SERVICES IP CONSULT    Code Status   No CPR- Do NOT Intubate    Time Spent on this Encounter   I, Chano Cortse MD, personally saw the patient today and spent less than or equal to 30 minutes discharging this patient.       Chano Cortes MD  [facility]  [address] [facility] [address]  Phone: [phone]  Fax: [phone]  ______________________________________________________________________    Physical Exam   Vital Signs: Temp: 99.1  F (37.3  C) Temp src: Temporal BP: 122/55 Pulse: 77   Resp: 16 SpO2: 95 % O2 Device: None (Room air)    Weight: 126 lbs 12.8 oz  Patient was comfortable and sleeping in the bed  Heart S1-S2 irregular  Lungs decreased breath sounds in the lung bases  Patient was oriented when she was awake most of the time       Primary Care Physician   [facility]    Discharge Orders      Primary Care - Care Coordination Referral      Follow-Up with Cardiology [facility]      General info for SNF    Length of Stay Estimate: Short Term Care: Estimated # of Days <30  Condition at Discharge: Improving  Level of care:skilled   Rehabilitation Potential: Good  Admission H&P remains valid and up-to-date: Yes  Recent Chemotherapy: N/A  Use Nursing Home Standing Orders: Yes     Mantoux instructions    Give two-step Mantoux (PPD) Per Facility Policy Yes     Reason for your hospital stay    Left hip fracture: IM Nail     Wound care (specify)    Site:   Left hip   Instructions:  Keep dressings clean, dry and intact.  Change if peeling off or >60% saturated.  Do not immerse.  Ok to shower over tegaderm and aquacel dressings.     Follow Up and recommended labs and tests    Follow up with   Cindy/Jennifer GIANG at [facility] 2 weeks after surgery.  If still in TCU, can be seen by the orthopedic provider at the care facility (if this is an option).    Call the care coordinator at [phone] to arrange the appt.   [facility]: [phone]  [facility]: [phone]     Activity - Up with assistive device    Up with walker/assist     Weight bearing status    WBAT Left LE with walker     Physical Therapy Adult Consult    Evaluate and treat as clinically indicated.    Reason:  Left hip fracture: IM Nail     Occupational Therapy Adult Consult    Evaluate and treat as clinically indicated.    Reason:  Left hip fracture: IM Nail     Fall precautions     Crutches DME    DME Documentation: Describe the reason for need to support medical necessity: Impaired gait status post hip surgery. I, the undersigned, certify that the above prescribed supplies are medically necessary for this patient and is both reasonable and necessary in reference to accepted standards of medical practice in the treatment of this patient's condition and is not prescribed as a convenience.     Cane DME    DME Documentation: Describe the reason for need to support medical necessity: Impaired gait status post hip surgery. I, the undersigned, certify that the above prescribed supplies are medically necessary for this patient and is both reasonable and necessary in reference to accepted standards of medical practice in the treatment of this patient's condition and is not prescribed as a convenience.     Walker DME    : DME Documentation: Describe the reason for need to support medical necessity: Impaired gait status post hip surgery. I, the undersigned, certify that the above prescribed supplies are medically necessary for this patient and is both reasonable and necessary in reference to accepted standards of medical practice in the treatment of this patient's condition and is not prescribed as a convenience.     Diet    Follow this diet upon discharge:  Orders Placed This Encounter      Advance Diet as Tolerated: Regular Diet Adult       Significant Results and Procedures   Most Recent 3 CBC's:Recent Labs   Lab Test 04/26/22  0659 04/24/22  0612 04/23/22  0706   WBC 15.2* 18.3* 22.9*   HGB 8.4* 7.5* 7.3*   MCV 97 100 102*    241 238     Most Recent 3 BMP's:Recent Labs   Lab Test 04/26/22  0659 04/25/22  1015 04/24/22  0612    129* 129*   POTASSIUM 4.6 4.4 4.2   CHLORIDE 108 104 103   CO2 19* 18* 16*   BUN 67* 63* 63*   CR 4.00* 3.98* 3.95*   ANIONGAP 6 7 10   LETI 8.7 8.5 8.0*   GLC 99 143* 100*     Most Recent 2 LFT's:Recent Labs   Lab Test 09/17/21  2124 08/26/21  0818   AST 39 26   ALT 33 23   ALKPHOS 116 190*   BILITOTAL 0.5 0.4     Most Recent 6 Bacteria Isolates From Any Culture (See EPIC Reports for Culture Details):Recent Labs   Lab Test 12/04/19  1118 09/25/19  1343 02/25/19  1013 08/30/17  1247 12/20/16  1436   CULT 10,000 to 50,000 colonies/mL  mixed urogenital jeanette  Susceptibility testing not routinely done   10,000 to 50,000 colonies/mL  mixed urogenital jeanette   >100,000 colonies/mL  Escherichia coli  * >100,000 colonies/mL  Escherichia coli  * >100,000 colonies/mL Klebsiella pneumoniae  >100,000 colonies/mL Strain 2 Klebsiella pneumoniae  <10,000 colonies/mL urogenital jeanette Susceptibility testing not routinely done  *     Most Recent Urinalysis:Recent Labs   Lab Test 04/23/22  0306 09/18/21  0229 08/31/21  1135   COLOR Orange*   < > Yellow   APPEARANCE Cloudy*   < > Clear   URINEGLC Negative   < > Negative   URINEBILI Negative   < > Negative   URINEKETONE Negative   < > Negative   SG 1.010   < > 1.020   UBLD Small*   < > Trace*   URINEPH 5.5   < > 6.0   PROTEIN 200 *   < > Trace*   UROBILINOGEN  --   --  0.2   NITRITE Negative   < > Negative   LEUKEST Large*   < > Small*   RBCU 18*   < > 0-2   WBCU >182*   < > 5-10*    < > = values in this interval not displayed.   ,   Results for orders placed or performed during the hospital encounter  of 04/20/22   XR Pelvis and Hip Left 1 View    Narrative    PELVIS AND LEFT HIP, ONE VIEW   4/20/2022 1:30 PM     HISTORY: Left hip pain.    COMPARISON: None.      Impression    IMPRESSION: Nondisplaced left intertrochanteric fracture extending to  the base of the femoral neck.    Mild to moderate bilateral hip degenerative changes. Probable  benign-appearing chronic calcification in the right pelvis.    JOSE VARELA MD         SYSTEM ID:  [id number]   Head CT w/o contrast    Narrative    CT SCAN OF THE HEAD WITHOUT CONTRAST  4/20/2022 1:17 PM     HISTORY: Fall. Head trauma. Pain. Headache.    TECHNIQUE:  Axial images of the head and coronal reformations without  IV contrast material. Radiation dose for this scan was reduced using  automated exposure control, adjustment of the mA and/or kV according  to patient size, or iterative reconstruction technique.    COMPARISON: None.    FINDINGS: There is no evidence of intracranial hemorrhage, mass, acute  infarct or anomaly. The ventricles are normal in size, shape and  configuration. Mild patchy periventricular white matter hypodensities  which are nonspecific, but likely related to chronic microvascular  ischemic disease. 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    The visualized portions of the sinuses and mastoids appear normal. The  bony calvarium and bones of the skull base appear intact.       Impression    IMPRESSION:     1. No evidence of acute intracranial hemorrhage, mass, or herniation.  2. Mild nonspecific white matter changes likely due to chronic  microvascular ischemic disease.   3. 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    RASHAUN SUMMERS MD         SYSTEM ID:  [id number]   US Upper Extremity Venous Duplex Left    Narrative    US UPPER EXTREMITY VENOUS DUPLEX LEFT  4/21/2022 3:16 PM     HISTORY: Left arm swelling.    COMPARISON: Left internal jugular vein.    TECHNIQUE: Color Doppler and spectral waveform analysis  performed  throughout the deep and superficial veins of the left upper extremity.    FINDINGS: The left internal jugular, subclavian, axillary, and  brachial veins demonstrate normal blood flow, compression (where  applicable), and augmentation. Basilic and cephalic veins are patent.  Radial and ulnar veins are compressible. Subcutaneous edema in the  forearm.      Impression    IMPRESSION: Negative for deep venous thrombosis in the left upper  extremity.    SHANNON PIZARRO MD         SYSTEM ID:  [id number]   XR Surgery MEGAN L/T 5 Min Fluoro w Stills    Narrative    This exam was marked as non-reportable because it will not be read by a   radiologist or a [facility] non-radiologist provider.         Echocardiogram Complete     Value    LVEF  >70%    Narrative    [id number]  [id number]  [id number]  [id number]^NADYA^HARLEEN^DELFINA     [facility]  Echocardiography Laboratory  [address] [place] [address]     Name: SHAWN STEVEN  MRN: [id number]  : 1935  Study Date: 2022 08:27 AM  Age: 86 yrs  Gender: Female  Patient Location: [email or web address]  Reason For Study: Murmur  Ordering Physician: HARLEEN COVINGTON  Performed By: Dolores Lehman     BSA: 1.6 m2  Height: 63 in  Weight: 119 lb  BP: 144/72 mmHg  ______________________________________________________________________________  Procedure  Complete Portable Echo Adult.  ______________________________________________________________________________  Interpretation Summary     Hyperdynamic left ventricular function  The visual ejection fraction is >70%.  No regional wall motion abnormalities noted.  There is moderate (2+) tricuspid regurgitation.  Right ventricular systolic pressure is elevated, consistent with mild  pulmonary hypertension.  The ascending aorta is Mildly dilated. (4.1cm)  Compared to , degree of TR is marginally worse and mild pulmonary  hypertension is now seen. The study was technically  difficult.  ______________________________________________________________________________  Left Ventricle  The left ventricle is normal in size. There is normal left ventricular wall  thickness. Hyperdynamic left ventricular function. The visual ejection  fraction is >70%. Diastolic Doppler findings (E/E' ratio and/or other  parameters) suggest left ventricular filling pressures are indeterminate.  Grade II or moderate diastolic dysfunction. No regional wall motion  abnormalities noted.     Right Ventricle  The right ventricle is normal size. The right ventricular systolic function is  normal.     Atria  Normal left atrial size. Right atrial size is normal.     Mitral Valve  There is trace mitral regurgitation.     Tricuspid Valve  There is moderate (2+) tricuspid regurgitation. The right ventricular systolic  pressure is approximated at 37.0 mmHg plus the right atrial pressure. Right  ventricular systolic pressure is elevated, consistent with mild pulmonary  hypertension.     Aortic Valve  There is mild trileaflet aortic sclerosis. There is trace to mild aortic  regurgitation. No aortic stenosis is present.     Pulmonic Valve  There is trace to mild pulmonic valvular regurgitation.     Vessels  The ascending aorta is Mildly dilated. (4.1cm). Inferior vena cava not well  visualized for estimation of right atrial pressure.     Pericardium  Trivial pericardial effusion.     Rhythm  Sinus rhythm was noted.  ______________________________________________________________________________  MMode/2D Measurements & Calculations  IVSd: 0.93 cm  LVIDd: 3.5 cm  LVIDs: 2.0 cm  LVPWd: 0.89 cm  FS: 43.0 %  LV mass(C)d: 90.4 grams  LV mass(C)dI: 58.3 grams/m2  Ao root diam: 3.1 cm  asc Aorta Diam: 4.1 cm  LVOT diam: 2.0 cm  LVOT area: 3.1 cm2  RWT: 0.51     Doppler Measurements & Calculations  MV E max shanda: 68.0 cm/sec  MV A max shanda: 80.3 cm/sec  MV E/A: 0.85  MV dec slope: 283.0 cm/sec2  MV dec time: 0.24 sec  Ao V2 max: 197.1  cm/sec  Ao max PG: 15.5 mmHg  Ao V2 mean: 137.0 cm/sec  Ao mean P.2 mmHg  Ao V2 VTI: 40.0 cm  BENSON(I,D): 2.0 cm2  BENSON(V,D): 1.9 cm2  LV V1 max P.7 mmHg  LV V1 max: 119.0 cm/sec  LV V1 VTI: 25.2 cm  SV(LVOT): 79.2 ml  SI(LVOT): 51.1 ml/m2     PA acc time: 0.10 sec  TR max jean: 304.0 cm/sec  TR max P.0 mmHg  AV Jean Ratio (DI): 0.60  BENSON Index (cm2/m2): 1.3  E/E' av.7  Lateral E/e': 10.8  Medial E/e': 12.6     ______________________________________________________________________________  Report approved by: Lucas Boudreaux 2022 10:10 AM               Discharge Medications   Current Discharge Medication List      START taking these medications    Details   acetaminophen (TYLENOL) 325 MG tablet Take 2 tablets (650 mg) by mouth every 4 hours as needed for other (For optimal non-opioid multimodal pain management to improve pain control.)  Qty: 30 tablet, Refills: 0    Associated Diagnoses: Closed intertrochanteric fracture of hip, left, initial encounter (H)      acetaminophen (TYLENOL) 650 MG suppository Place 1 suppository (650 mg) rectally every 4 hours as needed for fever  Qty: 6 suppository, Refills: 0    Associated Diagnoses: S/P kidney transplant; Hospice care patient      bisacodyl (DULCOLAX) 10 MG suppository Place 1 suppository (10 mg) rectally daily as needed for constipation  Qty: 2 suppository, Refills: 0    Associated Diagnoses: S/P kidney transplant; Hospice care patient      LORazepam (ATIVAN) 0.5 MG tablet Take 1 tablet (0.5 mg) by mouth every 6 hours as needed for anxiety or nausea Crush tablet into slurry.  Qty: 12 tablet, Refills: 0    Comments: Patient discharging to hospice at facility. Please bubble pack  Associated Diagnoses: S/P kidney transplant; Hospice care patient      ondansetron (ZOFRAN-ODT) 4 MG ODT tab Take 1 tablet (4 mg) by mouth every 6 hours as needed for nausea or vomiting  Qty: 8 tablet, Refills: 0    Associated Diagnoses: S/P kidney transplant; Hospice care  patient      senna-docusate (SENOKOT-S/PERICOLACE) 8.6-50 MG tablet Take 1 tablet by mouth 2 times daily as needed for constipation  Qty: 15 tablet    Associated Diagnoses: Closed intertrochanteric fracture of hip, left, initial encounter (H)         STOP taking these medications       HYDROmorphone (DILAUDID) 2 MG tablet Comments:   Reason for Stopping:         amLODIPine (NORVASC) 5 MG tablet Comments:   Reason for Stopping:         apixaban ANTICOAGULANT (ELIQUIS ANTICOAGULANT) 2.5 MG tablet Comments:   Reason for Stopping:         azaTHIOprine (IMURAN) 50 MG tablet Comments:   Reason for Stopping:         furosemide (LASIX) 20 MG tablet Comments:   Reason for Stopping:         GENGRAF (BRAND) 25 MG CAPSULE Comments:   Reason for Stopping:         metoprolol succinate ER (TOPROL-XL) 100 MG 24 hr tablet Comments:   Reason for Stopping:             Allergies   No Known Allergies

## 2022-05-02 NOTE — PLAN OF CARE
Goal Outcome Evaluation:      Patient remains on comfort care, patient to discharge today with hospice at 1130, patient alert and cooperative with care, patient continues to deny pain, no acute needs reported this shift will cont to monitor

## 2022-05-02 NOTE — PROGRESS NOTES
Care Management Discharge Note    Discharge Date: 05/02/2022 @ 1130    Discharge Disposition: [facility]    Discharge Services:  [facility]    Discharge DME:  Hospice to arrange    Discharge Transportation: [email or web address]    Private pay costs discussed: insurance costs out of pocket expenses for care suite    Education Provided on the Discharge Plan:  yes  Persons Notified of Discharge Plans: pt/pt's family, nursing, MD, palliative care NP, [facility], [facility]  Patient/Family in Agreement with the Plan: yes    Handoff Referral Completed: No    Additional Information:  Reviewed pt's status and discussed in rounds.     [employer] stretcher ride arranged for today at 1130. [facility] admission arranged for noon.     Confirmed plan with Vianey from [facility]. MARY faxed orders to [facility] f:[phone].    Also confirmed plan with Theodora at [facility]. Per Theodora, they would like meds filled here and sent with pt. MARY faxed orders to [facility] f: [phone].     Completed PCS form.     ALEJANDRA Andrade, LSW  Inpatient Care Coordination  [facility], [facility]  [phone]    ALEJANDRA Lund

## 2022-05-02 NOTE — PLAN OF CARE
Goal Outcome Evaluation:    Plan of Care Reviewed With: patient     Overall Patient Progress: declining    Outcome Evaluation: Pt transferred to facility with hospice services via [facility].  Discharge meds sent with pt along with belongings.  FAmily to meet at facility.

## 2022-05-02 NOTE — PROGRESS NOTES
[facility]  Palliative Care Progress Note  Text Page     Assessment & Plan   Recommendations:  1. Goals of Care- No CPR- Do NOT Intubate  Hospitalization goals discussed with patient at the bedside.  Goal for comfort and discharge to facility on hospice.  Decisional Capacity- Intact. Patient has an advance directive dated 04/13/2017.  If patient becomes unable to demonstrate decisional capacity, Cristofer Baird is the primary Health Care Agent.  Alternates are Juanita Green (daughter).   POLST Completed today indicating DNR, Comfort focused Measures.  - discharge today     2. Pain   S/P mechanical fall with Left hip fracture and subsequent Hip ORIF on 4/22/2022  Managed with PRN Tylenol  - acetaminophen 650 mg every 4 hours prn  - hydromorphone 0.2 mg IV every 2 hours prn  - hydromorphone 1 mg PO solution every 2 hours prn  - tramadol 25 mg every 6 hours prn  - Ice/Heat prn     3. End stage Renal Disease  History of kidney transplant in 1987, now with worsening CKD and nephrology is following for recommendations.  Patient refuses to pursue dialysis if needed.   - comfort focused plan of care.       4. Nausea/Vomiting  Significant nausea with any PO intake, gags on medications/pills.    - Zofran prn every 6 hours nausea.  - compazine as second line antiemetic agent  - lorazepam as third line antiemetic agent  - consider scheduled antiemetic for comfort     5. Spiritual Care  Oriented to Spiritual Health as part of Palliative Care team. Appreciate Care of  Harry Chung and Keyshawn Cuenca.   Spiritual Background: Shinto     5. Care Planning  Appreciate Care of Multidisciplinary team.  - discharge today.  - Hospice medications at discharge:   Acetaminophen PO/ mg every 4 hours prn pain/fever - crush tablets into slurry for administration   hydromorphone 1 mg tablet every 2 hours prn - crush into slurry for administration   ativan 0.5 mg every 2 hours prn anxiety/nausea - crush into  slurry for administration    Bisacodyl 1 suppository prn constipation      Medical Decision Making and Goals of Care:  Discussed on May 2, 2022 with Erica LUBIN CNP:   Met with Cande at the bedside. Reviewed her plan for discharge onto hospice.  Discussed expected symptoms and progression toward end of life.  Reviewed supportive care by facility staff and by hospice team.  Emphasized her ability to be with family and her focus on doing what she enjoys in order to maintain comfort.  She verbalized worry regarding death and what the process will look like.  Discussed the unknown details that she will experience, but defined the typical process as the kidneys fail.  Reassured her of the support for her care again and referenced God's plan as she has discussed during prior visits as a means to comfort.  She verbalized comfort and denied further concerns or needs.    AMEE Pelletier CNP, CNP  Pain Management and Palliative Care  [facility]  Pgr: [phone]      Time Spent on this Encounter   Total unit/floor time 25 minutes, time consisted of the following, examination of the patient, reviewing the record and completing documentation. >50% of time spent in counseling and coordination of care, Bedside Nurse Vera and Hospitalist Dr. Cortes, and  Phuong Hagen.  Time spend counseling with patient consisted of the following topics, care planning for discharge and symptom management.    Review of Systems    CONSTITUTIONAL: NEGATIVE for fever, chills, change in weight  ENT/MOUTH: NEGATIVE for ear, mouth and throat problems  RESP: NEGATIVE for significant cough or SOB  CV: NEGATIVE for chest pain, palpitations or peripheral edema    Palliative Symptom Review (0=no symptom/no concern, 1=mild, 2=moderate, 3=severe):      Pain: 1-mild      Fatigue: 2-moderate      Nausea: 2-moderate      Constipation: 0-none      Diarrhea: 0-none      Depressive Symptoms:  2-moderate      Anxiety: 2-moderate      Drowsiness: 0-none      Poor Appetite: 1-mild      Shortness of Breath: 0-none      Insomnia: 1-mild      Overall (0 good/no concerns, 3 very poor):  2-moderate    Physical Exam      126 lbs 12.8 oz  Exam:  GEN:  Elderly female, lying in bed, Alert, oriented x 3, appears comfortable, NAD.  CV:  +3 DP/PT pulses bilatererally; no edema BLE.  RESP: Symmetric chest rise on inhalation noted.  Normal respiratory effort.  ABD:  Rounded, soft, non-tender/non-distended.  +BS   SKIN:  Dry to touch, no exanthems noted in the visualized areas.    NEURO: alert, no focal deficits, moves extremities against gravity  PAIN BEHAVIOR: Cooperative  Psych:  anxious affect.  Calm, cooperative, conversant appropriately.       Medications       sodium chloride (PF)  3 mL Intracatheter Q8H       Data   Results for orders placed or performed during the hospital encounter of 04/20/22 (from the past 24 hour(s))   Asymptomatic COVID-19 Virus (Coronavirus) by PCR Nasopharyngeal    Specimen: Nasopharyngeal; Swab   Result Value Ref Range    SARS CoV2 PCR Negative Negative    Narrative    Testing was performed using the Xpert Xpress SARS-CoV-2 Assay on the   Cepheid Gene-Xpert Instrument Systems. Additional information about   this Emergency Use Authorization (EUA) assay can be found via the Lab   Guide. This test should be ordered for the detection of SARS-CoV-2 in   individuals who meet SARS-CoV-2 clinical and/or epidemiological   criteria. Test performance is unknown in asymptomatic patients. This   test is for in vitro diagnostic use under the FDA EUA for   laboratories certified under CLIA to perform high complexity testing.   This test has not been FDA cleared or approved. A negative result   does not rule out the presence of PCR inhibitors in the specimen or   target RNA in concentration below the limit of detection for the   assay. The possibility of a false negative should be considered if   the  patient's recent exposure or clinical presentation suggests   COVID-19. This test was validated by the [facility]. This laboratory is certified under the Clinical Laboratory Improvement Amendments of 1988 (CLIA-88) as qualified to perform high complexity laboratory testing.

## 2022-05-02 NOTE — PROGRESS NOTES
"SPIRITUAL HEALTH SERVICES Progress Note  [facility]    Saw pt Cande Baird per staff referral; Palliative Care Nurse Erica Nieves requesting emotional support for pt.  Provided reflective conversation to facilitate the processing of thoughts and feelings.      Illness Narrative - Cande reported that she will go to a different facility for Hospice Care.      Distress -   o Cande expressed sadness that she no longer has the energy to go out and do things with people.  When asked about her personal goals for the remainder of her life, Cande shared that she is \"looking forward\" to Hospice and spending time with her family and friends.  She reflected that she has many good memories of \"having fun\" with people.  Encouraged her to reminisce about those memories with her loved ones.  o Cande acknowledged that she does not like to be alone.  Encouraged her to communicate this information to her family and Hospice team.      Coping -   o Cande expressed gratitude for the support she receives from her daughter and son-in-law.  o She talked about the astrid she feels from her encounters with her young (great?) grandchildren.  o Cande welcomed prayer.      Meaning-Making - She reflected that she is \"not a smart person\" because she lacks interest in technology and learning new things.  Cande affirmed that she enjoys being with people: \"Whenever I did something new, I wanted someone with me.\"      Plan - Oriented pt to the availability of  support with Hospice Care.  Pt affirmed, \"I will need that.\"    Harry Chung M.Div., [facility]  Staff   Phone [phone]  "

## 2022-06-02 NOTE — PROGRESS NOTES
--- Be certain to take your thyroid pill daily on an empty stomach, wait an hour to eat or to take your other pills, and separate your thyroid pill from milk products/iron/calcium by 4 hours. Be certain to use a pill box and you can double up if you are certain you missed a dose. If you are taking biotin containing supplements (ie in a hair/skin/nails multi-vitamin), please hold that 3 days prior to having thyroid labs done.    ---  For follow-up, please follow-up with Dr Martell and we will ensure he gets a copy of my note.    For scheduled follow-up appointments, we may order testing to be done prior to your visit with the intent that we will address it at the appointment.  We will only contact you prior to the appointment if there is an urgent result which cannot wait until the appointment.    Here is a list of websites with patient information about various endocrine conditions and glands.  These websites are created and maintained by national endocrine societies, such as American Association of Clinical Endocrinologists, American Thyroid Association, the American Diabetes Association, and the Endocrine Society.    Hormone Health Network (Endocrine Society):  www.hormone.org  EMPOWER (American Association of Clinical Endocrinologists): https://www.aace.com/  The American Thyroid Association: www.thyroid.org/thyroid-information  The American Diabetes Association: diabetes.org    Additionally, there is a website called [employer] which has patient education on a variety of topics.  It can be found at www.[facility].RatePoint/contents/cyoox-hz-qzwvzmkx/patient-education or by doing a search for Up To Date patient education.  If you select \"Hormones,\" that will take you to a page of general endocrine topics and if you choose \"Diabetes,\" then you will be taken to page with diabetes specific information.      All of the above websites are routinely updated so you can be assured they reflect the most recent medical literature.  SPIRITUAL HEALTH SERVICES Progress Note  [facility]    Saw pt Cande Baird per an admission request.  Provided reflective conversation to facilitate the processing of thoughts and feelings.      Illness Narrative - Cande reported that she had a fall and fractured her hip.  She narrated the events that led to her admission.      Distress - Cande expressed her concerns about her spouse's recent change of belief that there is no God.  She reflected on the difficulties they have had throughout their marriage but affirmed that they still love each other.      Coping -   o Cande named her daughter, her four grandchildren, and the community at her senior living facility as being core to her support network.  She mentioned that she has 17 great grandchildren.  o Cande attends services at the [facility] where she lives.  She welcomed prayer.      Meaning-Making -   o Cande shared that her son  in a plane accident when he was 31, right after his SO broke up with him.  Cande doubts that his death was an accident because she perceived him as being depressed after the break-up.    o She reminisced briefly about growing up in [facility] and meeting her spouse.      Plan - Informed pt how she can request further  support.  This author and other chaplains remain available per pt/family request.    Harry Chung M.Div., [facility]  Staff   Phone [phone]         ~~~~~~~~~~~~~~~~~~~~~~~~~~~~  Please confirm that your medications match this list on this after visit summary - if not, please contact our office.    Please call in or send a secure message if problems/questions/concerns regarding this or future visits.    Please contact your pharmacy if you need refills.      Please call in if you have lab work or imaging done and do not hear from our office. Please be aware that if you have an active [employer] account, you will be able to see your laboratory test results before I have reviewed and advised upon them. Please allow our office the time to review your results and make recommendations. If you are concerned about your test results, or are concerned about the length of time you are waiting to hear back from the clinic, please feel free to call and leave a message. We are always happy to hear from you.    Our main clinic phone number is: [phone]    It was our pleasure serving you today. We thank you for choosing [facility] as your health care provider.    Because we value your input, you may receive a survey in the mail/email. Our priority is to provide you with excellent care and services so please take the time to complete the survey and return it. Please be aware that you do not have to answer survey questions that did not apply to your office visit. Your answers will help us change and grow to provide exceptional service. Thank you in advance for your assistance in helping [facility] build a stronger care team.    Thank you for your trust and confidence in us!    Dr. Chavarria and [facility]

## 2022-06-09 ENCOUNTER — DOCUMENTATION ONLY (OUTPATIENT)
Dept: TRANSPLANT | Facility: CLINIC | Age: 87
End: 2022-06-09

## 2022-06-09 ENCOUNTER — POST MORTEM DOCUMENTATION (OUTPATIENT)
Dept: TRANSPLANT | Facility: CLINIC | Age: 87
End: 2022-06-09
Payer: COMMERCIAL

## 2022-06-09 NOTE — PROGRESS NOTES
Patient status change  Received: Today  Prerna Garcia, Marlen Pollard RN  Cande Baird is . Date of death: 2022.     Received notification on 22 at 12:11 PM of patient's death from Prerna Garcia.  Contact information is unknown.  Place of death was not reported. Appears she was transferred from [facility] to [facility] on 22 11:30 AM  Graft status at the time of death was reported as Unknown.  TIS verification is: Pending  The Transplant Office has been notified that patient is . The Post Mortem Encounter has been completed. Notifications have been sent to the Care team and Immunology lab.   Instructions have been sent to cancel pending appointments, discontinue pending orders and eliminate paper chart.    Marlen Little RN, BSN  Solid Organ Transplant, Post Kidney and Pancreas  Transplant Care Coordinator  [phone]     JONATHANJANIA PÉREZELS    Received notification of patients death from cause of death worklist.  Place of death was reported as [facility].  Graft status at the time of death was reported as Functioning.  TIS verification is: Complete

## 2023-04-11 NOTE — ED PROVIDER NOTES
"ED Provider Note  [facility]      History     Chief Complaint   Patient presents with     Gait Problem     Fatigue     Abnormal Labs     HPI  Cande Baird is a 86 year old female who with a history of kidney transplant in 1987 who presents with concerns for pyelonephritis as well as increasing creatinine.  Patient had outpatient UA done as well as CT which showed UTI as well as concern for pyelonephritis and possible hydronephrosis.  Patient was advised to go to the emergency department.  Here in the emergency department patient notes dizziness.  She was noted to be hypertensive, this is unusual for the patient.  She notes decreased p.o. intake recently.  No other symptoms noted.          Review of Systems  A complete review of systems was performed with pertinent positives and negatives noted in the HPI, and all other systems negative.    Physical Exam   BP: (!) 189/112  Pulse: 110  Temp: 98.2  F (36.8  C)  Resp: 16  Height: 160 cm (5' 3\")  Weight: 59 kg (130 lb)  SpO2: 96 %  Physical Exam  Constitutional:       General: She is not in acute distress.     Appearance: She is well-developed. She is not diaphoretic.   HENT:      Head: Normocephalic and atraumatic.      Mouth/Throat:      Pharynx: No oropharyngeal exudate.   Eyes:      General: No scleral icterus.        Right eye: No discharge.         Left eye: No discharge.      Pupils: Pupils are equal, round, and reactive to light.   Cardiovascular:      Rate and Rhythm: Regular rhythm. Tachycardia present.      Heart sounds: Normal heart sounds. No murmur heard.   No friction rub. No gallop.    Pulmonary:      Effort: Pulmonary effort is normal. No respiratory distress.      Breath sounds: Normal breath sounds. No wheezing.   Chest:      Chest wall: No tenderness.   Abdominal:      General: Bowel sounds are normal. There is no distension.      Palpations: Abdomen is soft.      Tenderness: There is no abdominal tenderness. " Occupational Therapy Visit    Visit Type: Daily Treatment Note  Visit: 12  Referring Provider: Bienvenido Pritchett DO  Medical Diagnosis (from order): Diagnosis Information    Diagnosis  719.43 (ICD-9-CM) - M25.531 (ICD-10-CM) - Right wrist pain       Patient alert and oriented X3.    SUBJECTIVE                                                                                                               Repots a burning ache / sensation along right thenar webspace. Pain is described as constant vs. Intermittent. Hands have felt more stiff and swollen recently, especially in the morning.   Functional Change: No positive changes with increase in thenar discomfort     Pain / Symptoms  - Pain rating (out of 10): Current: 4 ; Best: 4; Worst: 6     OBJECTIVE                                                                                                                                       Treatment    Dry Needling:  - Consent signed: yes  - Dry needling used to/for: reduced myofascial dysfunction/restriction and pain relief  - Education about indications, contraindications and potential side effects completed with patient.  Screen Completed    - Precautions: local skin lesions, lyme disease, local lymphedema, severe hyperalgesia/allodynia, metal allergies: nickel and chromium, abnormal bleeding tendency, immunodeficiency and/or compromised immune system, second or third trimester of pregnancy, vascular disease, history of spontaneous pneumothorax   - Contraindications: local or systemic infections including the flu, over implants, active cancer, area of lymphatic compromise, area of lumpectomy/mastectomy, first trimester of pregnancy      - Location: right supinator Needle size: 30 Quantity: 1   - Location: right ECRL/ B Needle size: 30 Quantity: 1   - Location: right brachioradialis Needle size: 30 Quantity: 1    Total Needles Inserted: 3 Removed: 3    Ultrasound ([id number])  - Location: right thenar region, radial side of    Musculoskeletal:         General: No tenderness or deformity. Normal range of motion.      Cervical back: Normal range of motion and neck supple.   Skin:     General: Skin is warm and dry.      Coloration: Skin is not pale.      Findings: No erythema or rash.   Neurological:      Mental Status: She is alert and oriented to person, place, and time.      Cranial Nerves: No cranial nerve deficit.         ED Course      Procedures              Results for orders placed or performed during the hospital encounter of 08/24/21   US Renal Transplant     Status: None    Narrative    EXAMINATION: US RENAL TRANSPLANT, 8/24/2021 4:27 PM     COMPARISON: 8/18/2021    HISTORY: VERONIQUE    TECHNIQUE: Grey-scale, color Doppler and spectral flow analysis.    FINDINGS:  The transplant kidney is located in the right lower quadrant, and  measures 12.3 cm. Parenchyma is of normal thickness and echogenicity.  Simple renal cysts measuring up to 3.1 cm. No focal lesions. No  hydronephrosis. No perinephric fluid collection.    Renal artery flow:   77 cm/s peak systolic at hilum.  109 cm/s peak systolic at anastomosis.  Arcuate artery resistive indices (upper to lower): 0.77, 0.77, 0.74    Renal Vein Flow:  26 cm/s at hilum.   65 cm/s at anastomosis.    Iliac artery flow:  76 cm/s peak systolic above anastomosis.  70 cm/s peak systolic below anastomosis.    Iliac vein flow:  Patent above and below the anastomosis.        Impression    IMPRESSION:   1. Mild hydroureter without hydronephrosis. Scattered simple renal  cysts. Otherwise normal grayscale appearance of the renal transplant.   2. Patent renal transplant Doppler evaluation. Borderline elevated  arcuate artery resistive indices, which is nonspecific and can be seen  with medical renal disease or graft dysfunction/rejection.          HARPAL GROVER MD         SYSTEM ID:  [id number]   CBC with platelets differential     Status: Abnormal    Narrative    The following orders were created for  wrist   - Position: sitting  - Duty Cycle: 50%  - Frequency: 3 Mhz  : .6.  - Duration: 8 minutes    Results: decreased pain  Reaction: no adverse reaction to treatment      Manual Therapy   Soft tissue manual mobilization after dry needling treatment to increase blood flow and circulation to site to increase tissue remodeling process with increasing healthy muscle tissue.   Kinesiotape: right forearm/ lateral epicondyle tape for support and pain relief after needling.     Activities of Daily Living/Self Care  Issued edema glove (size XS) for right for edema and tightness in distal extremity, to worn PRN, up to 23 hours/ day.   Discussed exercise options to counter act total body \"tightness\". Discussed several options including aquatics, missy chi, peleton ap. Patient receptive and had good insight to options that best suit her time and lifestyle.     Skilled input: verbal instruction/cues, tactile instruction/cues and demonstration    Writer verbally educated and received verbal consent for hand placement, positioning of patient, and techniques to be performed today from patient for therapist position for techniques and hand placement and palpation for techniques as described above and how they are pertinent to the patient's plan of care.    Home Exercise Program  Thumb opposition  Thumb MP flexion (assistive/passive)  Thumb MP extension (passive)  Thumb IP flexion (assistive/passive)    Forearm/wrist isometrics: wrist extension, wrist flexion, radial deviation, finger extension x 10 for 3 second holds.   Seated Isometric Thumb Extension with Manual Resistance - 1 x daily - 7 x weekly - 1 sets - 5 reps  Seated Isometric Thumb Flexion - 1 x daily - 7 x weekly - 1 sets - 10 reps        ASSESSMENT                                                                                                            Dry needling proximal to thenar region secondary to several muscle trigger points and active locations of pain and  fascial shortening. Alleviated with increasing length and maxi zing lengthen with soft tissue and use of k-tape to support today's efforts. Reports being sore in forearm at end of care, pain not rated.   Education:   - Results of above outlined education: Verbalizes understanding and Demonstrates understanding    PLAN                                                                                                                           Suggestions for next session as indicated: Progress per plan of care: dry needling 1x/ week, distal extremity strength: length, k-tape PRN and ultrasound PRN, check effectiveness of glove       Therapy procedure time and total treatment time can be found documented on the Time Entry flowsheet   panel order CBC with platelets differential.  Procedure                               Abnormality         Status                     ---------                               -----------         ------                     CBC with platelets and d...[[id number]]  Abnormal            Final result                 Please view results for these tests on the individual orders.   Comprehensive metabolic panel     Status: Abnormal   Result Value Ref Range    Sodium 130 (L) 133 - 144 mmol/L    Potassium 4.5 3.4 - 5.3 mmol/L    Chloride 96 94 - 109 mmol/L    Carbon Dioxide (CO2) 27 20 - 32 mmol/L    Anion Gap 7 3 - 14 mmol/L    Urea Nitrogen 29 7 - 30 mg/dL    Creatinine 1.67 (H) 0.52 - 1.04 mg/dL    Calcium 9.9 8.5 - 10.1 mg/dL    Glucose 121 (H) 70 - 99 mg/dL    Alkaline Phosphatase 207 (H) 40 - 150 U/L    AST 31 0 - 45 U/L    ALT 34 0 - 50 U/L    Protein Total 7.2 6.8 - 8.8 g/dL    Albumin 3.3 (L) 3.4 - 5.0 g/dL    Bilirubin Total 0.6 0.2 - 1.3 mg/dL    GFR Estimate 28 (L) >60 mL/min/1.73m2   UA with Microscopic reflex to Culture     Status: Abnormal    Specimen: Urine, Clean Catch   Result Value Ref Range    Color Urine Light Yellow Colorless, Straw, Light Yellow, Yellow    Appearance Urine Slightly Cloudy (A) Clear    Glucose Urine Negative Negative mg/dL    Bilirubin Urine Negative Negative    Ketones Urine Negative Negative mg/dL    Specific Gravity Urine 1.016 1.003 - 1.035    Blood Urine Negative Negative    pH Urine 5.5 5.0 - 7.0    Protein Albumin Urine 20  (A) Negative mg/dL    Urobilinogen Urine Normal Normal, 2.0 mg/dL    Nitrite Urine Negative Negative    Leukocyte Esterase Urine Moderate (A) Negative    Bacteria Urine Few (A) None Seen /HPF    Mucus Urine Present (A) None Seen /LPF    RBC Urine 1 <=2 /HPF    WBC Urine 38 (H) <=5 /HPF    Squamous Epithelials Urine 4 (H) <=1 /HPF    Narrative    Urine Culture ordered based on laboratory criteria   CBC with platelets and differential     Status: Abnormal   Result  Value Ref Range    WBC Count 10.9 4.0 - 11.0 10e3/uL    RBC Count 4.17 3.80 - 5.20 10e6/uL    Hemoglobin 12.5 11.7 - 15.7 g/dL    Hematocrit 38.8 35.0 - 47.0 %    MCV 93 78 - 100 fL    MCH 30.0 26.5 - 33.0 pg    MCHC 32.2 31.5 - 36.5 g/dL    RDW 14.6 10.0 - 15.0 %    Platelet Count 365 150 - 450 10e3/uL    % Neutrophils 74 %    % Lymphocytes 7 %    % Monocytes 15 %    % Eosinophils 3 %    % Basophils 1 %    % Immature Granulocytes 0 %    NRBCs per 100 WBC 0 <1 /100    Absolute Neutrophils 8.1 1.6 - 8.3 10e3/uL    Absolute Lymphocytes 0.8 0.8 - 5.3 10e3/uL    Absolute Monocytes 1.7 (H) 0.0 - 1.3 10e3/uL    Absolute Eosinophils 0.3 0.0 - 0.7 10e3/uL    Absolute Basophils 0.1 0.0 - 0.2 10e3/uL    Absolute Immature Granulocytes 0.0 <=0.0 10e3/uL    Absolute NRBCs 0.0 10e3/uL   Asymptomatic COVID-19 Virus (Coronavirus) by PCR Nasopharyngeal     Status: Normal    Specimen: Nasopharyngeal; Swab   Result Value Ref Range    SARS CoV2 PCR Negative Negative    Narrative    Testing was performed using the Xpert Xpress SARS-CoV-2 Assay on the  Cepheid Gene-Xpert Instrument Systems. Additional information about  this Emergency Use Authorization (EUA) assay can be found via the Lab  Guide. This test should be ordered for the detection of SARS-CoV-2 in  individuals who meet SARS-CoV-2 clinical and/or epidemiological  criteria. Test performance is unknown in asymptomatic patients. This  test is for in vitro diagnostic use under the FDA EUA for  laboratories certified under CLIA to perform high complexity testing.  This test has not been FDA cleared or approved. A negative result  does not rule out the presence of PCR inhibitors in the specimen or  target RNA in concentration below the limit of detection for the  assay. The possibility of a false negative should be considered if  the patient's recent exposure or clinical presentation suggests  COVID-19. This test was validated by the [facility]  [facility]. This laboratory is certified under  the Clinical Laboratory Improvement Amendments of 1988 (CLIA-88) as  qualified to perform high complexity laboratory testing.       Medications   hydrALAZINE (APRESOLINE) injection 10 mg (10 mg Intravenous Not Given 8/24/21 2321)   lidocaine 1 % 0.1-1 mL (has no administration in time range)   lidocaine (LMX4) cream (has no administration in time range)   sodium chloride (PF) 0.9% PF flush 3 mL (3 mLs Intracatheter Given 8/25/21 0055)   sodium chloride (PF) 0.9% PF flush 3 mL (has no administration in time range)   melatonin tablet 1 mg (has no administration in time range)   polyethylene glycol (MIRALAX) Packet 17 g (has no administration in time range)   ondansetron (ZOFRAN-ODT) ODT tab 4 mg (has no administration in time range)     Or   ondansetron (ZOFRAN) injection 4 mg (has no administration in time range)   prochlorperazine (COMPAZINE) injection 5 mg (has no administration in time range)     Or   prochlorperazine (COMPAZINE) tablet 5 mg (has no administration in time range)     Or   prochlorperazine (COMPAZINE) suppository 12.5 mg (has no administration in time range)   heparin ANTICOAGULANT injection 5,000 Units (5,000 Units Subcutaneous Given 8/25/21 0050)   cefTRIAXone (ROCEPHIN) 1 g vial to attach to  mL bag for ADULTS or NS 50 mL bag for PEDS (has no administration in time range)   azaTHIOprine (IMURAN) tablet 50 mg (has no administration in time range)   cycloSPORINE modified (GENGRAF BRAND) capsule 25 mg (25 mg Oral Given 8/25/21 0050)   acetaminophen (TYLENOL) tablet 650 mg (has no administration in time range)   cefTRIAXone (ROCEPHIN) 1 g vial to attach to  mL bag for ADULTS or NS 50 mL bag for PEDS (0 g Intravenous Stopped 8/24/21 1926)   hydrALAZINE (APRESOLINE) injection 10 mg (10 mg Intravenous Given 8/24/21 1850)        Assessments & Plan (with Medical Decision Making)   This is a 86-year-old female with a history of kidney transplant  1987 who presents due to concerns for pyelonephritis.  Patient was found to have UTI as well as concern for pyelonephritis on outpatient UA and CT scan.  Her creatinine is also increasing.  Creatinine is 1.67 up from a baseline of 1.  Ultrasound of the transplanted kidney shows hydroureter but no hydronephrosis.  There is also borderline elevated arcuate artery indices.  This could represent graft dysfunction obstruction.  I discussed the case with Nephrology recommends continuing current transplant medications.  Patient was given ceftriaxone for UTI.  She was noted to be considerably hypertensive and was given hydralazine which improved her blood pressure. We will admit for further monitoring work-up and treatment.    I have reviewed the nursing notes. I have reviewed the findings, diagnosis, plan and need for follow up with the patient.    Current Discharge Medication List          Final diagnoses:   Pyelonephritis       --  Abraham Feliciano DO  [facility] [address] [place]  8/24/2021     Abraham Feliciano DO  08/25/21 0126

## (undated) DEVICE — BAG CLEAR TRASH 1.3M 39X33" P4040C

## (undated) DEVICE — PACK HIP NAILING SOP32HPFC4

## (undated) DEVICE — DRAPE X-RAY TUBE 00-901169-01-OEC

## (undated) DEVICE — DRSG ABDOMINAL 07 1/2X8" 7197D

## (undated) DEVICE — DRSG ADAPTIC 3X8" 6113

## (undated) DEVICE — GLOVE PROTEXIS BLUE W/NEU-THERA 6.5  2D73EB65

## (undated) DEVICE — PREP CHLORAPREP 26ML TINTED HI-LITE ORANGE 930815

## (undated) DEVICE — DRILL BIT QUICK COUPLING 3 FLUTE 4.2MMX330/100MM CALIBRATE

## (undated) DEVICE — LINEN ORTHO ACL PACK 5447

## (undated) DEVICE — LINEN FULL SHEET 5511

## (undated) DEVICE — SYR 30ML LL W/O NDL

## (undated) DEVICE — STPL SKIN 35W 6.9MM  PXW35

## (undated) DEVICE — NDL 22GA 1.5"

## (undated) DEVICE — LINEN HALF SHEET 5512

## (undated) DEVICE — SU MONOCRYL 4-0 PS-2 27" UND Y426H

## (undated) DEVICE — CATH TRAY URETHRAL 14FR LF 772417

## (undated) DEVICE — DRSG GAUZE 4X4" 8044

## (undated) DEVICE — ESU GROUND PAD ADULT W/CORD E7507

## (undated) DEVICE — GLOVE PROTEXIS POWDER FREE 8.5 ORTHOPEDIC 2D73ET85

## (undated) DEVICE — SU VICRYL 2-0 CT-1 27" UND J259H

## (undated) DEVICE — WIRE GUIDE 3.2X400MM  357.399

## (undated) DEVICE — SU VICRYL 0 CT-1 36" J346H

## (undated) DEVICE — GLOVE PROTEXIS BLUE W/NEU-THERA 8.5  2D73EB85

## (undated) DEVICE — GLOVE PROTEXIS POWDER FREE 6.5 ORTHOPEDIC 2D73ET65

## (undated) DEVICE — DECANTER BAG 2002S

## (undated) RX ORDER — CEFAZOLIN SODIUM/WATER 2 G/20 ML
SYRINGE (ML) INTRAVENOUS
Status: DISPENSED
Start: 2022-01-01

## (undated) RX ORDER — EPHEDRINE SULFATE 50 MG/ML
INJECTION, SOLUTION INTRAMUSCULAR; INTRAVENOUS; SUBCUTANEOUS
Status: DISPENSED
Start: 2022-01-01

## (undated) RX ORDER — BUPIVACAINE HYDROCHLORIDE AND EPINEPHRINE 5; 5 MG/ML; UG/ML
INJECTION, SOLUTION EPIDURAL; INTRACAUDAL; PERINEURAL
Status: DISPENSED
Start: 2022-01-01

## (undated) RX ORDER — DEXAMETHASONE SODIUM PHOSPHATE 4 MG/ML
INJECTION, SOLUTION INTRA-ARTICULAR; INTRALESIONAL; INTRAMUSCULAR; INTRAVENOUS; SOFT TISSUE
Status: DISPENSED
Start: 2022-01-01

## (undated) RX ORDER — NEOSTIGMINE METHYLSULFATE 1 MG/ML
VIAL (ML) INJECTION
Status: DISPENSED
Start: 2022-01-01

## (undated) RX ORDER — ONDANSETRON 2 MG/ML
INJECTION INTRAMUSCULAR; INTRAVENOUS
Status: DISPENSED
Start: 2022-01-01

## (undated) RX ORDER — LIDOCAINE HYDROCHLORIDE 10 MG/ML
INJECTION, SOLUTION EPIDURAL; INFILTRATION; INTRACAUDAL; PERINEURAL
Status: DISPENSED
Start: 2022-01-01

## (undated) RX ORDER — FENTANYL CITRATE 50 UG/ML
INJECTION, SOLUTION INTRAMUSCULAR; INTRAVENOUS
Status: DISPENSED
Start: 2022-01-01

## (undated) RX ORDER — TRANEXAMIC ACID 10 MG/ML
INJECTION, SOLUTION INTRAVENOUS
Status: DISPENSED
Start: 2022-01-01

## (undated) RX ORDER — FENTANYL CITRATE-0.9 % NACL/PF 10 MCG/ML
PLASTIC BAG, INJECTION (ML) INTRAVENOUS
Status: DISPENSED
Start: 2022-01-01

## (undated) RX ORDER — PROPOFOL 10 MG/ML
INJECTION, EMULSION INTRAVENOUS
Status: DISPENSED
Start: 2022-01-01

## (undated) RX ORDER — GLYCOPYRROLATE 0.2 MG/ML
INJECTION INTRAMUSCULAR; INTRAVENOUS
Status: DISPENSED
Start: 2022-01-01